# Patient Record
Sex: MALE | Race: WHITE | NOT HISPANIC OR LATINO | Employment: OTHER | RURAL
[De-identification: names, ages, dates, MRNs, and addresses within clinical notes are randomized per-mention and may not be internally consistent; named-entity substitution may affect disease eponyms.]

---

## 2017-03-18 ENCOUNTER — CONVERSION ENCOUNTER (OUTPATIENT)
Dept: FAMILY MEDICINE CLINIC | Facility: CLINIC | Age: 57
End: 2017-03-18

## 2017-03-19 LAB
ALBUMIN SERPL-MCNC: 4.1 G/DL (ref 3.6–5.1)
ALP SERPL-CCNC: 69 U/L (ref 40–115)
ALT SERPL-CCNC: 15 U/L (ref 9–46)
AST SERPL-CCNC: 14 U/L (ref 10–35)
BILIRUB SERPL-MCNC: 0.3 MG/DL (ref 0.2–1.2)
BUN SERPL-MCNC: 15 MG/DL (ref 7–25)
BUN/CREAT SERPL: NORMAL (CALC) (ref 6–22)
CALCIUM SERPL-MCNC: 9.7 MG/DL (ref 8.6–10.3)
CHLORIDE SERPL-SCNC: 108 MMOL/L (ref 98–110)
CHOLEST SERPL-MCNC: 167 MG/DL (ref 125–200)
CHOLEST/HDLC SERPL: 5.8 (CALC)
CONV CO2: 27 MMOL/L (ref 20–31)
CONV TOTAL PROTEIN: 6.8 G/DL (ref 6.1–8.1)
CREAT UR-MCNC: 0.79 MG/DL (ref 0.7–1.33)
GLOBULIN UR ELPH-MCNC: 2.7 MG/DL (ref 1.9–3.7)
GLUCOSE UR QL: 93 MG/DL (ref 65–99)
HDLC SERPL-MCNC: 29 MG/DL
INSULIN SERPL-ACNC: 1.5 (CALC) (ref 1–2.5)
LDLC SERPL CALC-MCNC: 125 MG/DL
NONHDLC SERPL-MCNC: 138 MG/DL
POTASSIUM SERPL-SCNC: 4.3 MMOL/L (ref 3.5–5.3)
PSA SERPL-MCNC: 0.9 NG/ML
SODIUM SERPL-SCNC: 141 MMOL/L (ref 135–146)
TRIGL SERPL-MCNC: 63 MG/DL

## 2017-04-07 ENCOUNTER — OFFICE VISIT (OUTPATIENT)
Dept: NEUROLOGY | Facility: CLINIC | Age: 57
End: 2017-04-07

## 2017-04-07 VITALS
BODY MASS INDEX: 29.4 KG/M2 | SYSTOLIC BLOOD PRESSURE: 125 MMHG | HEIGHT: 71 IN | WEIGHT: 210 LBS | DIASTOLIC BLOOD PRESSURE: 80 MMHG

## 2017-04-07 DIAGNOSIS — R56.9 CONVULSIONS, UNSPECIFIED CONVULSION TYPE (HCC): Primary | ICD-10-CM

## 2017-04-07 PROCEDURE — 99212 OFFICE O/P EST SF 10 MIN: CPT | Performed by: PSYCHIATRY & NEUROLOGY

## 2017-04-07 RX ORDER — PAROXETINE 10 MG/1
1 TABLET, FILM COATED ORAL DAILY
Refills: 1 | COMMUNITY
Start: 2017-04-04 | End: 2018-04-27 | Stop reason: ALTCHOICE

## 2017-04-07 RX ORDER — LEVETIRACETAM 500 MG/1
500 TABLET ORAL 2 TIMES DAILY
Qty: 60 TABLET | Refills: 11 | Status: SHIPPED | OUTPATIENT
Start: 2017-04-07 | End: 2018-04-27 | Stop reason: SDUPTHER

## 2017-04-07 NOTE — PROGRESS NOTES
Subjective:     Patient ID: Mook Bose is a 57 y.o. male.    History of Present Illness     The patient's had no seizures over the past year.  His last was in 2013.  He is having no side effects.  Is compliant with medication.  The following portions of the patient's history were reviewed and updated as appropriate: allergies, current medications, past family history, past medical history, past social history, past surgical history and problem list.      Current Outpatient Prescriptions:   •  levETIRAcetam (KEPPRA) 500 MG tablet, Take 1 tablet by mouth 2 (Two) Times a Day., Disp: 60 tablet, Rfl: 11  •  Omega-3 Fatty Acids (FISH OIL) 1000 MG capsule capsule, Take  by mouth., Disp: , Rfl:   •  PARoxetine (PAXIL) 10 MG tablet, Take 1 tablet by mouth Daily., Disp: , Rfl: 1    Review of Systems   Constitutional: Negative.    Neurological: Negative.    Psychiatric/Behavioral: Negative.         Objective:    Neurologic Exam  Mental status examination was appropriate.  Funduscopy, visual fields, eye movements and pupillary reflexes were normal.  No facial weakness was noted.  Gait was normal.  No pattern of focal weakness was noted.  Physical Exam    Assessment/Plan:     Mook was seen today for seizures.    Diagnoses and all orders for this visit:    Convulsions, unspecified convulsion type    Other orders  -     levETIRAcetam (KEPPRA) 500 MG tablet; Take 1 tablet by mouth 2 (Two) Times a Day.       Prescription drug management - generic Keppra as above    Follow-up in the office in one year.Thank you for allowing me to share in the care of this patient.  Adolph Oneill M.D.

## 2017-09-05 ENCOUNTER — HOSPITAL ENCOUNTER (OUTPATIENT)
Dept: OTHER | Facility: HOSPITAL | Age: 57
Discharge: HOME | End: 2017-09-05
Attending: FAMILY MEDICINE | Admitting: FAMILY MEDICINE

## 2017-11-21 ENCOUNTER — TELEPHONE (OUTPATIENT)
Dept: NEUROLOGY | Facility: CLINIC | Age: 57
End: 2017-11-21

## 2017-11-21 NOTE — TELEPHONE ENCOUNTER
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 He stated you would know what it is that is needed, that it is done for him yearly. Any questions call him. Thank you

## 2017-12-07 ENCOUNTER — TELEPHONE (OUTPATIENT)
Dept: NEUROLOGY | Facility: CLINIC | Age: 57
End: 2017-12-07

## 2017-12-07 NOTE — TELEPHONE ENCOUNTER
Received fax from patient. Typed a new letter and mailed it to the patient. Called patient and made him aware.

## 2017-12-07 NOTE — TELEPHONE ENCOUNTER
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 He also stated that he will fax the form over to us and a letter from his PCP. He asked if we could write out a letter similar to the one his PCP wrote.  Please Advice.

## 2018-04-27 ENCOUNTER — OFFICE VISIT (OUTPATIENT)
Dept: NEUROLOGY | Facility: CLINIC | Age: 58
End: 2018-04-27

## 2018-04-27 VITALS
SYSTOLIC BLOOD PRESSURE: 120 MMHG | BODY MASS INDEX: 28.56 KG/M2 | DIASTOLIC BLOOD PRESSURE: 70 MMHG | WEIGHT: 204 LBS | HEIGHT: 71 IN

## 2018-04-27 DIAGNOSIS — R41.3 MEMORY LOSS: ICD-10-CM

## 2018-04-27 DIAGNOSIS — R56.9 CONVULSIONS, UNSPECIFIED CONVULSION TYPE (HCC): Primary | ICD-10-CM

## 2018-04-27 PROCEDURE — 99213 OFFICE O/P EST LOW 20 MIN: CPT | Performed by: PSYCHIATRY & NEUROLOGY

## 2018-04-27 RX ORDER — LEVETIRACETAM 500 MG/1
500 TABLET ORAL 2 TIMES DAILY
Qty: 180 TABLET | Refills: 3 | Status: SHIPPED | OUTPATIENT
Start: 2018-04-27 | End: 2019-04-26 | Stop reason: SDUPTHER

## 2018-04-27 NOTE — PROGRESS NOTES
Subjective:     Patient ID: Mook Bose is a 58 y.o. male.    History of Present Illness     The patient has had no seizures over the last year.  Last seizure was in 2013.  EEG in 2009 showed left frontotemporal slowing.  Compliant with the medicine.  No side effects.  He is also having some problems with his memory.  Sounds like a static problem.  He is interested in taking an over-the-counter medicine for this but does not know the name of it.  He will call with the name of that drug.  The following portions of the patient's history were reviewed and updated as appropriate: allergies, current medications, past family history, past medical history, past social history, past surgical history and problem list.      Current Outpatient Prescriptions:   •  aspirin 81 MG tablet, Take 81 mg by mouth Daily., Disp: , Rfl:   •  levETIRAcetam (KEPPRA) 500 MG tablet, Take 1 tablet by mouth 2 (Two) Times a Day., Disp: 180 tablet, Rfl: 3  •  Omega-3 Fatty Acids (FISH OIL) 1000 MG capsule capsule, Take  by mouth., Disp: , Rfl:     Review of Systems   Constitutional: Negative.    Neurological: Negative.    Psychiatric/Behavioral: Negative.         Objective:    Neurologic Exam  Mental status examination was appropriate.  Funduscopy, visual fields, eye movements and pupillary reflexes were normal.  No facial weakness was noted.  Gait was normal.  No pattern of focal weakness was noted.  Physical Exam    Assessment/Plan:     Mook was seen today for seizures.    Diagnoses and all orders for this visit:    Convulsions, unspecified convulsion type    Memory loss    Other orders  -     levETIRAcetam (KEPPRA) 500 MG tablet; Take 1 tablet by mouth 2 (Two) Times a Day.       Prescription drug management - meds as above  Will call concerning medicine for memory.  Follow-up in the office in one year. Thank you for allowing me to share in the care of this patient.  Adolph Oneill M.D.

## 2018-12-14 ENCOUNTER — TELEPHONE (OUTPATIENT)
Dept: NEUROLOGY | Facility: CLINIC | Age: 58
End: 2018-12-14

## 2018-12-14 NOTE — TELEPHONE ENCOUNTER
Patient is requesting a letter for the [employer] office stating that he has been seizure free. This is the same letter that he has needed for the last few years. Please call when available, he will

## 2019-04-26 ENCOUNTER — OFFICE VISIT (OUTPATIENT)
Dept: NEUROLOGY | Facility: CLINIC | Age: 59
End: 2019-04-26

## 2019-04-26 VITALS
SYSTOLIC BLOOD PRESSURE: 122 MMHG | HEIGHT: 70 IN | BODY MASS INDEX: 29.2 KG/M2 | WEIGHT: 204 LBS | DIASTOLIC BLOOD PRESSURE: 60 MMHG

## 2019-04-26 DIAGNOSIS — R41.3 MEMORY LOSS: ICD-10-CM

## 2019-04-26 DIAGNOSIS — R56.9 CONVULSIONS, UNSPECIFIED CONVULSION TYPE (HCC): Primary | ICD-10-CM

## 2019-04-26 PROCEDURE — 99213 OFFICE O/P EST LOW 20 MIN: CPT | Performed by: PSYCHIATRY & NEUROLOGY

## 2019-04-26 RX ORDER — LEVETIRACETAM 500 MG/1
500 TABLET ORAL 2 TIMES DAILY
Qty: 180 TABLET | Refills: 3 | Status: SHIPPED | OUTPATIENT
Start: 2019-04-26 | End: 2020-05-04

## 2019-04-26 NOTE — PROGRESS NOTES
Subjective:     Patient ID: Mook Bose is a 59 y.o. male.    History of Present Illness    The patient was seen back in the office for follow-up of seizures and memory difficulties.  No seizures over the past year.  Keppra 500 mg twice daily.  Compliant.  No side effects.  Memory problems have improved.  He was under a lot of stress at the time.  Is also on some over-the-counter medication for this.    The following portions of the patient's history were reviewed and updated as appropriate: allergies, current medications, past family history, past medical history, past social history, past surgical history and problem list.      Current Outpatient Medications:   •  aspirin 81 MG tablet, Take 81 mg by mouth Daily., Disp: , Rfl:   •  levETIRAcetam (KEPPRA) 500 MG tablet, Take 1 tablet by mouth 2 (Two) Times a Day., Disp: 180 tablet, Rfl: 3  •  Omega-3 Fatty Acids (FISH OIL) 1000 MG capsule capsule, Take  by mouth., Disp: , Rfl:     Review of Systems   Constitutional: Negative.    Neurological: Negative.    Psychiatric/Behavioral: Negative.           I have reviewed ROS completed by medical assistant.     Objective:    Neurologic Exam  Exam  Physical Exam    Assessment/Plan:     Mook was seen today for seizures.    Diagnoses and all orders for this visit:    Convulsions, unspecified convulsion type (CMS/HCC)    Memory loss    Other orders  -     levETIRAcetam (KEPPRA) 500 MG tablet; Take 1 tablet by mouth 2 (Two) Times a Day.         Prescription drug management - meds as above    Follow-up in the office in one year. Thank you for allowing me to share in the care of this patient.  Adolph Oneill M.D.

## 2019-09-12 ENCOUNTER — OFFICE VISIT (OUTPATIENT)
Dept: FAMILY MEDICINE CLINIC | Facility: CLINIC | Age: 59
End: 2019-09-12

## 2019-09-12 VITALS
HEART RATE: 85 BPM | SYSTOLIC BLOOD PRESSURE: 116 MMHG | BODY MASS INDEX: 29.98 KG/M2 | DIASTOLIC BLOOD PRESSURE: 62 MMHG | TEMPERATURE: 97.3 F | RESPIRATION RATE: 16 BRPM | HEIGHT: 70 IN | WEIGHT: 209.4 LBS | OXYGEN SATURATION: 96 %

## 2019-09-12 DIAGNOSIS — S06.5XAA SUBDURAL HEMATOMA (HCC): ICD-10-CM

## 2019-09-12 DIAGNOSIS — R56.9 SEIZURES (HCC): ICD-10-CM

## 2019-09-12 DIAGNOSIS — S12.591A OTHER CLOSED NONDISPLACED FRACTURE OF SIXTH CERVICAL VERTEBRA, INITIAL ENCOUNTER (HCC): Primary | ICD-10-CM

## 2019-09-12 PROBLEM — S12.501A CLOSED NONDISPLACED FRACTURE OF SIXTH CERVICAL VERTEBRA (HCC): Status: ACTIVE | Noted: 2019-09-12

## 2019-09-12 PROCEDURE — 99214 OFFICE O/P EST MOD 30 MIN: CPT | Performed by: FAMILY MEDICINE

## 2019-09-12 RX ORDER — METHOCARBAMOL 500 MG/1
2 TABLET, FILM COATED ORAL 4 TIMES DAILY
Refills: 0 | COMMUNITY
Start: 2019-09-04 | End: 2020-02-19
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
## 2019-09-12 NOTE — ASSESSMENT & PLAN NOTE
Pt doing well.   Will follow up with neuro at [place] 10/15/19.  Advised pt to discontinue Aspirin and fish oil for approximately 2 -3 months.

## 2019-10-03 ENCOUNTER — TELEPHONE (OUTPATIENT)
Dept: FAMILY MEDICINE CLINIC | Facility: CLINIC | Age: 59
End: 2019-10-03

## 2020-02-19 ENCOUNTER — TELEPHONE (OUTPATIENT)
Dept: FAMILY MEDICINE CLINIC | Facility: CLINIC | Age: 60
End: 2020-02-19

## 2020-02-19 ENCOUNTER — OFFICE VISIT (OUTPATIENT)
Dept: FAMILY MEDICINE CLINIC | Facility: CLINIC | Age: 60
End: 2020-02-19

## 2020-02-19 VITALS
RESPIRATION RATE: 18 BRPM | TEMPERATURE: 98 F | HEART RATE: 76 BPM | SYSTOLIC BLOOD PRESSURE: 121 MMHG | HEIGHT: 70 IN | BODY MASS INDEX: 31.35 KG/M2 | DIASTOLIC BLOOD PRESSURE: 84 MMHG | WEIGHT: 219 LBS | OXYGEN SATURATION: 98 %

## 2020-02-19 DIAGNOSIS — M79.2 NERVE PAIN: Primary | ICD-10-CM

## 2020-02-19 PROCEDURE — 99214 OFFICE O/P EST MOD 30 MIN: CPT | Performed by: FAMILY MEDICINE

## 2020-02-19 RX ORDER — ACYCLOVIR 800 MG/1
800 TABLET ORAL
Qty: 50 TABLET | Refills: 0 | Status: SHIPPED | OUTPATIENT
Start: 2020-02-19 | End: 2021-02-04

## 2020-02-19 RX ORDER — PREDNISONE 20 MG/1
20 TABLET ORAL DAILY
Qty: 26 TABLET | Refills: 0 | Status: SHIPPED | OUTPATIENT
Start: 2020-02-19 | End: 2020-03-06

## 2020-03-02 ENCOUNTER — TELEPHONE (OUTPATIENT)
Dept: FAMILY MEDICINE CLINIC | Facility: CLINIC | Age: 60
End: 2020-03-02

## 2020-03-22 PROBLEM — M79.2 NERVE PAIN: Status: ACTIVE | Noted: 2020-03-22

## 2020-03-22 NOTE — ASSESSMENT & PLAN NOTE
Patient reports his symptoms are similar to Bell's palsy when he had them previously.  Despite advised the patient go to the hospital patient refused.  Patient was prescribed prednisone and Acyclovir to treat his symptoms.

## 2020-04-16 ENCOUNTER — TELEPHONE (OUTPATIENT)
Dept: FAMILY MEDICINE CLINIC | Facility: CLINIC | Age: 60
End: 2020-04-16

## 2020-05-04 RX ORDER — LEVETIRACETAM 500 MG/1
TABLET ORAL
Qty: 180 TABLET | Refills: 0 | Status: SHIPPED | OUTPATIENT
Start: 2020-05-04 | End: 2020-08-06

## 2020-06-12 ENCOUNTER — CLINICAL SUPPORT (OUTPATIENT)
Dept: FAMILY MEDICINE CLINIC | Facility: CLINIC | Age: 60
End: 2020-06-12

## 2020-06-12 NOTE — PROGRESS NOTES
"Blood Pressure Check:   Patient came to the office after he was sent by his son. 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 His son encouraged him to come in and have his BP checked.   I asked if he had any symptoms and he denied the following when asked: dizziness, lightheaded, headache, blurry vision, shaky, nausea, chest pain, chest pressure/tightness, shortness of breath.   I asked when he last ate- he said at breakfast- just something light.   He has worked outside all day (in a rinku shirt, jeans and boots- probably safety equipment, harness, hardhat as well) and thinks he just might have gotten hot.     I asked if he would allow me to check his blood sugar to see if this is why he was feeling the way he is. He declined.   His BP was taken manually 120/82; Pulse 55 O2 76.   I asked if he would like to see the doctor to see if more was going on other than his blood pressure. He said \"no thank you. I feel like Im ok- maybe just got a little hot\"     I advised that if he starts to feel bad again, please give us a call or go to the Urgent Care or ER.     -Za Kulkarni LPN    "

## 2020-08-06 RX ORDER — LEVETIRACETAM 500 MG/1
TABLET ORAL
Qty: 180 TABLET | Refills: 0 | Status: SHIPPED | OUTPATIENT
Start: 2020-08-06 | End: 2020-08-13 | Stop reason: SDUPTHER

## 2020-08-13 ENCOUNTER — OFFICE VISIT (OUTPATIENT)
Dept: NEUROLOGY | Facility: CLINIC | Age: 60
End: 2020-08-13

## 2020-08-13 DIAGNOSIS — R56.9 SEIZURES (HCC): Primary | ICD-10-CM

## 2020-08-13 DIAGNOSIS — S06.5XAA SUBDURAL HEMATOMA (HCC): ICD-10-CM

## 2020-08-13 PROCEDURE — 99442 PR PHYS/QHP TELEPHONE EVALUATION 11-20 MIN: CPT | Performed by: PSYCHIATRY & NEUROLOGY

## 2020-08-13 RX ORDER — LEVETIRACETAM 500 MG/1
500 TABLET ORAL 2 TIMES DAILY
Qty: 180 TABLET | Refills: 3 | Status: SHIPPED | OUTPATIENT
Start: 2020-08-13 | End: 2020-11-16 | Stop reason: SDUPTHER

## 2020-08-13 NOTE — PROGRESS NOTES
Phone visit.  Follow-up of seizures.  Subdural hematoma in the past.  No seizures for a number of years.  He is on Keppra generic 500 mg twice daily.  No side effects.  Happy with current management.           Mook was seen today for seizures.    Diagnoses and all orders for this visit:    Seizures (CMS/HCC)    Subdural hematoma (CMS/HCC)    Other orders  -     levETIRAcetam (KEPPRA) 500 MG tablet; Take 1 tablet by mouth 2 (Two) Times a Day.    Prescription drug management - meds as above    Follow-up in the office in one year. Thank you for allowing me to share in the care of this patient.  Adolph Oneill M.D.      You have chosen to receive care through a telephone visit. Do you consent to use a telephone visit for your medical care today? Yes  This visit has been rescheduled as a phone visit to comply with patient safety concerns in accordance with CDC recommendations. Total time of discussion was 18 minutes.

## 2020-10-27 ENCOUNTER — CLINICAL SUPPORT (OUTPATIENT)
Dept: FAMILY MEDICINE CLINIC | Facility: CLINIC | Age: 60
End: 2020-10-27

## 2020-10-27 VITALS
OXYGEN SATURATION: 98 % | RESPIRATION RATE: 20 BRPM | HEIGHT: 70 IN | TEMPERATURE: 97.9 F | SYSTOLIC BLOOD PRESSURE: 121 MMHG | BODY MASS INDEX: 29.43 KG/M2 | HEART RATE: 64 BPM | DIASTOLIC BLOOD PRESSURE: 76 MMHG | WEIGHT: 205.6 LBS

## 2020-10-27 DIAGNOSIS — Z02.4 ENCOUNTER FOR CDL (COMMERCIAL DRIVING LICENSE) EXAM: Primary | ICD-10-CM

## 2020-10-27 DIAGNOSIS — E66.3 OVERWEIGHT WITH BODY MASS INDEX (BMI) OF 29 TO 29.9 IN ADULT: ICD-10-CM

## 2020-10-27 DIAGNOSIS — Z87.891 HISTORY OF TOBACCO USE: ICD-10-CM

## 2020-10-27 LAB
BILIRUB BLD-MCNC: NEGATIVE MG/DL
CLARITY, POC: CLEAR
COLOR UR: YELLOW
GLUCOSE UR STRIP-MCNC: NEGATIVE MG/DL
KETONES UR QL: NEGATIVE
LEUKOCYTE EST, POC: NEGATIVE
NITRITE UR-MCNC: NEGATIVE MG/ML
PH UR: 5 [PH] (ref 5–8)
PROT UR STRIP-MCNC: ABNORMAL MG/DL
RBC # UR STRIP: NEGATIVE /UL
SP GR UR: 1.02 (ref 1–1.03)
UROBILINOGEN UR QL: NORMAL

## 2020-10-27 PROCEDURE — 81002 URINALYSIS NONAUTO W/O SCOPE: CPT | Performed by: FAMILY MEDICINE

## 2020-10-27 PROCEDURE — DOTPHY: Performed by: FAMILY MEDICINE

## 2020-10-28 ENCOUNTER — TELEPHONE (OUTPATIENT)
Dept: NEUROLOGY | Facility: CLINIC | Age: 60
End: 2020-10-28

## 2020-10-28 NOTE — TELEPHONE ENCOUNTER
Left a message to call back and make appointment with Dr Puri or Dr Durham. 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

## 2020-10-28 NOTE — TELEPHONE ENCOUNTER
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       He also needs to know who his new neurologist is and get scheduled for them.     Can someone please contact patient and get him scheduled and advise on the letter?     [phone]    Thank you

## 2020-11-16 ENCOUNTER — TELEPHONE (OUTPATIENT)
Dept: NEUROLOGY | Facility: CLINIC | Age: 60
End: 2020-11-16

## 2020-11-16 RX ORDER — LEVETIRACETAM 500 MG/1
500 TABLET ORAL 2 TIMES DAILY
Qty: 180 TABLET | Refills: 3 | Status: SHIPPED | OUTPATIENT
Start: 2020-11-16 | End: 2021-01-20 | Stop reason: SDUPTHER

## 2020-11-16 NOTE — TELEPHONE ENCOUNTER
Caller: SATYA LOPEZ     Relationship: SELF    Best call back number: [phone]    Medication needed: levETIRAcetam (KEPPRA) 500 MG tablet    When do you need the refill by: ONLY 1.5 DAY LEFT    What details did the patient provide when requesting the medication: PT STATED BOTTLE SAYS NO MORE REFILLS BUT HE HAS TO HAVE THIS MEDICATION. STATES HE HAS TAKEN THIS MEDICATION FOR OVER 10 YEARS. PT STATES HE HAS NEVER BEEN THIS CLOSE TO BEING OUT OF HIS PRESCRIPTION.       What is the patient's preferred pharmacy:  [facility]        PLEASE ADVISE.     THANK YOU

## 2021-01-20 ENCOUNTER — OFFICE VISIT (OUTPATIENT)
Dept: FAMILY MEDICINE CLINIC | Facility: CLINIC | Age: 61
End: 2021-01-20

## 2021-01-20 VITALS
TEMPERATURE: 97.7 F | OXYGEN SATURATION: 99 % | DIASTOLIC BLOOD PRESSURE: 75 MMHG | SYSTOLIC BLOOD PRESSURE: 129 MMHG | WEIGHT: 206 LBS | HEIGHT: 71 IN | RESPIRATION RATE: 18 BRPM | HEART RATE: 60 BPM | BODY MASS INDEX: 28.84 KG/M2

## 2021-01-20 DIAGNOSIS — R41.3 MEMORY LOSS: ICD-10-CM

## 2021-01-20 DIAGNOSIS — R56.9 SEIZURES (HCC): Primary | ICD-10-CM

## 2021-01-20 DIAGNOSIS — R55 SYNCOPE, UNSPECIFIED SYNCOPE TYPE: ICD-10-CM

## 2021-01-20 DIAGNOSIS — F05 CONFUSION AFTER A SEIZURE: ICD-10-CM

## 2021-01-20 DIAGNOSIS — S06.5XAA SUBDURAL HEMATOMA (HCC): ICD-10-CM

## 2021-01-20 PROCEDURE — 99214 OFFICE O/P EST MOD 30 MIN: CPT | Performed by: FAMILY MEDICINE

## 2021-01-20 RX ORDER — MULTIVIT WITH MINERALS/LUTEIN
250 TABLET ORAL DAILY
COMMUNITY
End: 2021-02-22 | Stop reason: SDUPTHER

## 2021-01-20 RX ORDER — LEVETIRACETAM 500 MG/1
500 TABLET ORAL 3 TIMES DAILY
Qty: 270 TABLET | Refills: 0 | Status: SHIPPED | OUTPATIENT
Start: 2021-01-20 | End: 2021-02-22 | Stop reason: SDUPTHER

## 2021-01-20 NOTE — ASSESSMENT & PLAN NOTE
Worse.  Probable 2nd to seizures.   MMSE done  today, read by me, reviewed with pt  MMSE score was 29 out of 30.  Pt. Is scheduled to follow with Neurologist in 2-2021.

## 2021-01-20 NOTE — ASSESSMENT & PLAN NOTE
Discussed MRI of the brain.  Pt. Declines at present.  Pt. Is scheduled to follow with Neurologist in 2-2021.

## 2021-01-20 NOTE — PROGRESS NOTES
Subjective   Mook Bose is a 60 y.o. male.     Confusion over the last 3 months is becoming more frequent and worsening.  This always occurs after episodes would appear to be minor seizures will last from hours to as long as a day    Memory Loss  This is a recurrent problem. The current episode started more than 1 year ago. The problem occurs intermittently (Only occurs after episodes what sounds like minor seizures). The problem has been gradually worsening. Associated symptoms include diaphoresis (night wakes up wet) and fatigue. Pertinent negatives include no chills or fever. Nothing aggravates the symptoms. He has tried nothing for the symptoms.   Syncope  This is a new problem. The current episode started 1 to 4 weeks ago. Nothing aggravates the symptoms. Associated symptoms include confusion, diaphoresis (night wakes up wet) and dizziness. Pertinent negatives include no fever. He has tried nothing for the symptoms.   Dizziness  This is a new problem. The current episode started more than 1 month ago. The problem occurs constantly. The problem has been gradually worsening. Associated symptoms include diaphoresis (night wakes up wet) and fatigue. Pertinent negatives include no chills or fever. The treatment provided no relief.        The following portions of the patient's history were reviewed and updated as appropriate: allergies, current medications, past family history, past medical history, past social history, past surgical history and problem list.    Family History   Problem Relation Age of Onset   • Alzheimer's disease Mother 62   • Cancer Father    • Alzheimer's disease Sister 70   • No Known Problems Brother    • No Known Problems Daughter    • No Known Problems Son    • No Known Problems Brother    • No Known Problems Brother        Social History     Tobacco Use   • Smoking status: Former Smoker     Years: 21.00     Types: Cigars, Cigarettes     Quit date:      Years since quittin.0  "  • Smokeless tobacco: Never Used   Substance Use Topics   • Alcohol use: No   • Drug use: No       Past Surgical History:   Procedure Laterality Date   • NO PAST SURGERIES         Patient Active Problem List   Diagnosis   • Seizures (CMS/HCC)   • Memory loss   • Closed nondisplaced fracture of sixth cervical vertebra (CMS/HCC)   • Subdural hematoma (CMS/HCC)   • Nerve pain   • Encounter for CDL (commercial driving license) exam   • Overweight with body mass index (BMI) of 29 to 29.9 in adult   • History of tobacco use   • Syncope   • Confusion after a seizure       Current Outpatient Medications on File Prior to Visit   Medication Sig Dispense Refill   • aspirin 81 MG tablet Take 81 mg by mouth Daily.     • Omega-3 Fatty Acids (FISH OIL) 1000 MG capsule capsule Take  by mouth.     • vitamin C (ASCORBIC ACID) 250 MG tablet Take 250 mg by mouth Daily.     • acyclovir (ZOVIRAX) 800 MG tablet Take 1 tablet by mouth 5 (Five) Times a Day. 50 tablet 0     No current facility-administered medications on file prior to visit.        Allergies   Allergen Reactions   • Penicillins Hives       Review of Systems   Constitutional: Positive for diaphoresis (night wakes up wet) and fatigue. Negative for chills and fever.   Cardiovascular: Positive for syncope.   Neurological: Positive for dizziness, syncope, speech difficulty, memory problem and confusion.       Objective   Visit Vitals  /75 (BP Location: Right arm, Patient Position: Sitting, Cuff Size: Large Adult)   Pulse 60   Temp 97.7 °F (36.5 °C) (Temporal)   Resp 18   Ht 180.3 cm (71\")   Wt 93.4 kg (206 lb)   SpO2 99%   BMI 28.73 kg/m²     Physical Exam  Vitals signs and nursing note reviewed.   Constitutional:       Appearance: He is well-developed.   HENT:      Head: Normocephalic.   Neck:      Musculoskeletal: Neck supple.      Thyroid: No thyromegaly.      Vascular: No carotid bruit.      Trachea: Trachea normal.   Cardiovascular:      Rate and Rhythm: Normal rate " and regular rhythm.      Heart sounds: No murmur. No friction rub. No gallop.    Pulmonary:      Effort: Pulmonary effort is normal. No respiratory distress.      Breath sounds: Normal breath sounds. No wheezing.   Chest:      Chest wall: No tenderness.   Skin:     General: Skin is dry.      Findings: No rash.      Nails: There is no clubbing.     Neurological:      General: No focal deficit present.      Mental Status: He is alert and oriented to person, place, and time.      Cranial Nerves: Cranial nerves are intact.   Psychiatric:         Behavior: Behavior is cooperative.           Assessment/Plan .  Problem List Items Addressed This Visit        High    Seizures (CMS/HCC) - Primary    Current Assessment & Plan     Worse.  Increase Keppra to TID.  Pt. Is scheduled to follow with Neurologist in 2-2021.         Subdural hematoma (CMS/HCC)    Current Assessment & Plan     Discussed MRI of the brain.  Pt. Declines at present.  Pt. Is scheduled to follow with Neurologist in 2-2021.            Unprioritized    Confusion after a seizure    Current Assessment & Plan     New dx.  2nd to seizure.  Pt. Is scheduled to follow with Neurologist in 2-2021.         Memory loss    Current Assessment & Plan     Worse.  Probable 2nd to seizures.   MMSE done  today, read by me, reviewed with pt  MMSE score was 29 out of 30.  Pt. Is scheduled to follow with Neurologist in 2-2021.         Syncope    Current Assessment & Plan     New dx.  EKG done today, read by me, reviewed with pt.  EKG was normal, discussed Holter monitor, pt. Declines  Present.  Probable 2nd to seizures.

## 2021-02-04 ENCOUNTER — OFFICE VISIT (OUTPATIENT)
Dept: FAMILY MEDICINE CLINIC | Facility: CLINIC | Age: 61
End: 2021-02-04

## 2021-02-04 VITALS
OXYGEN SATURATION: 99 % | DIASTOLIC BLOOD PRESSURE: 73 MMHG | RESPIRATION RATE: 16 BRPM | BODY MASS INDEX: 29.49 KG/M2 | HEART RATE: 60 BPM | HEIGHT: 70 IN | SYSTOLIC BLOOD PRESSURE: 122 MMHG | TEMPERATURE: 97.7 F | WEIGHT: 206 LBS

## 2021-02-04 DIAGNOSIS — R53.83 LETHARGY: ICD-10-CM

## 2021-02-04 DIAGNOSIS — R41.3 MEMORY LOSS: ICD-10-CM

## 2021-02-04 DIAGNOSIS — R35.1 NOCTURIA: ICD-10-CM

## 2021-02-04 DIAGNOSIS — R56.9 SEIZURES (HCC): Primary | ICD-10-CM

## 2021-02-04 DIAGNOSIS — E78.2 MIXED HYPERLIPIDEMIA: ICD-10-CM

## 2021-02-04 DIAGNOSIS — Z12.5 SCREENING PSA (PROSTATE SPECIFIC ANTIGEN): ICD-10-CM

## 2021-02-04 DIAGNOSIS — R73.9 HYPERGLYCEMIA: ICD-10-CM

## 2021-02-04 DIAGNOSIS — H90.2 CONDUCTIVE HEARING LOSS, UNSPECIFIED LATERALITY: ICD-10-CM

## 2021-02-04 PROBLEM — E78.5 HYPERLIPIDEMIA: Status: ACTIVE | Noted: 2021-02-04

## 2021-02-04 PROBLEM — H91.90 HEARING LOSS: Status: ACTIVE | Noted: 2021-02-04

## 2021-02-04 LAB
BILIRUB BLD-MCNC: NEGATIVE MG/DL
CLARITY, POC: CLEAR
COLOR UR: YELLOW
GLUCOSE UR STRIP-MCNC: NEGATIVE MG/DL
KETONES UR QL: NEGATIVE
LEUKOCYTE EST, POC: NEGATIVE
NITRITE UR-MCNC: NEGATIVE MG/ML
PH UR: 5 [PH] (ref 5–8)
PROT UR STRIP-MCNC: NEGATIVE MG/DL
RBC # UR STRIP: NEGATIVE /UL
SP GR UR: 1.01 (ref 1–1.03)
UROBILINOGEN UR QL: NORMAL

## 2021-02-04 PROCEDURE — 81002 URINALYSIS NONAUTO W/O SCOPE: CPT | Performed by: FAMILY MEDICINE

## 2021-02-04 PROCEDURE — 99213 OFFICE O/P EST LOW 20 MIN: CPT | Performed by: FAMILY MEDICINE

## 2021-02-04 NOTE — PROGRESS NOTES
Subjective   Mook Bose is a 60 y.o. male.     Seizures   This is a chronic problem. The current episode started more than 1 week ago. The problem has been gradually improving. Pertinent negatives include no chest pain.   Urinary Frequency   This is a chronic problem. The current episode started more than 1 year ago. The problem occurs intermittently. The patient is experiencing no pain. Associated symptoms include frequency.        The following portions of the patient's history were reviewed and updated as appropriate: current medications, past family history, past medical history, past social history, past surgical history and problem list.    Family History   Problem Relation Age of Onset   • Alzheimer's disease Mother 62   • Cancer Father    • Alzheimer's disease Sister 70   • No Known Problems Brother    • No Known Problems Daughter    • No Known Problems Son    • No Known Problems Brother    • No Known Problems Brother        Social History     Tobacco Use   • Smoking status: Former Smoker     Years: 21.00     Types: Cigars, Cigarettes     Quit date:      Years since quittin.   • Smokeless tobacco: Never Used   Substance Use Topics   • Alcohol use: No   • Drug use: No       Past Surgical History:   Procedure Laterality Date   • NO PAST SURGERIES         Patient Active Problem List   Diagnosis   • Seizures (CMS/HCC)   • Memory loss   • Closed nondisplaced fracture of sixth cervical vertebra (CMS/HCC)   • Subdural hematoma (CMS/HCC)   • Nerve pain   • Encounter for CDL (commercial driving license) exam   • Overweight with body mass index (BMI) of 29 to 29.9 in adult   • History of tobacco use   • Syncope   • Confusion after a seizure   • Hearing loss   • Lethargy   • Nocturia   • Screening PSA (prostate specific antigen)   • Hyperlipidemia   • Hyperglycemia       Current Outpatient Medications on File Prior to Visit   Medication Sig Dispense Refill   • aspirin 81 MG tablet Take 81 mg by mouth  "Daily.     • levETIRAcetam (KEPPRA) 500 MG tablet Take 1 tablet by mouth 3 (Three) Times a Day. 270 tablet 0   • Omega-3 Fatty Acids (FISH OIL) 1000 MG capsule capsule Take  by mouth.     • vitamin C (ASCORBIC ACID) 250 MG tablet Take 250 mg by mouth Daily.     • [DISCONTINUED] acyclovir (ZOVIRAX) 800 MG tablet Take 1 tablet by mouth 5 (Five) Times a Day. 50 tablet 0     No current facility-administered medications on file prior to visit.        Allergies   Allergen Reactions   • Penicillins Hives       Review of Systems   Constitutional: Positive for fatigue.   HENT: Positive for hearing loss.    Respiratory: Negative for shortness of breath.    Cardiovascular: Negative for chest pain and palpitations.   Genitourinary: Positive for frequency.        Nocturia   Musculoskeletal: Negative for joint swelling.   Neurological: Positive for seizures and memory problem.       Objective   Visit Vitals  /73 (BP Location: Left arm, Patient Position: Sitting, Cuff Size: Large Adult)   Pulse 60   Temp 97.7 °F (36.5 °C)   Resp 16   Ht 177.8 cm (70\")   Wt 93.4 kg (206 lb)   SpO2 99%   BMI 29.56 kg/m²     Physical Exam  Vitals signs and nursing note reviewed.   Constitutional:       Appearance: He is well-developed.   HENT:      Head: Normocephalic.   Neck:      Musculoskeletal: Neck supple.      Thyroid: No thyromegaly.      Vascular: No carotid bruit.      Trachea: Trachea normal.   Cardiovascular:      Rate and Rhythm: Normal rate and regular rhythm.      Heart sounds: No murmur. No friction rub. No gallop.    Pulmonary:      Effort: Pulmonary effort is normal. No respiratory distress.      Breath sounds: Normal breath sounds. No wheezing.   Chest:      Chest wall: No tenderness.   Skin:     General: Skin is dry.      Findings: No rash.      Nails: There is no clubbing.     Neurological:      Mental Status: He is alert and oriented to person, place, and time.   Psychiatric:         Behavior: Behavior is cooperative. "           Assessment/Plan .  Problem List Items Addressed This Visit        High    Seizures (CMS/HCC) - Primary    Current Assessment & Plan     ---Will draw labs today and discuss results at next visit.         Relevant Orders    Levetiracetam Level (Keppra)       Unprioritized    Hearing loss    Current Assessment & Plan     Advised to use ear protection and avoid noise exposure.         Hyperglycemia    Current Assessment & Plan     Will draw labs today and discuss results at next visit.         Relevant Orders    Hemoglobin A1c    Hyperlipidemia    Current Assessment & Plan     Will draw labs today and discuss results at next visit.         Relevant Orders    Lipid Panel With / Chol / HDL Ratio    Comprehensive Metabolic Panel    Lethargy    Current Assessment & Plan     Mild- Will draw labs today and discuss results at next visit.         Relevant Orders    TSH    CBC & Differential    Memory loss    Current Assessment & Plan     Mini-mental done today- scored 28 out of 30.  Follow conservatively           Nocturia    Current Assessment & Plan     Mild- 1 x night- UA done today was normal. Follow conservatively         Relevant Orders    POCT urinalysis dipstick, manual (Completed)    Screening PSA (prostate specific antigen)    Current Assessment & Plan     Will draw labs today and discuss results at next visit.         Relevant Orders    PSA Screen

## 2021-02-05 LAB — PSA SERPL-MCNC: 1.7 NG/ML (ref 0–4)

## 2021-02-07 LAB
ALBUMIN SERPL-MCNC: 4.5 G/DL (ref 3.8–4.8)
ALBUMIN/GLOB SERPL: 1.5 {RATIO} (ref 1.2–2.2)
ALP SERPL-CCNC: 78 IU/L (ref 39–117)
ALT SERPL-CCNC: 17 IU/L (ref 0–44)
AST SERPL-CCNC: 16 IU/L (ref 0–40)
BASOPHILS # BLD AUTO: 0 X10E3/UL (ref 0–0.2)
BASOPHILS NFR BLD AUTO: 1 %
BILIRUB SERPL-MCNC: 0.4 MG/DL (ref 0–1.2)
BUN SERPL-MCNC: 18 MG/DL (ref 8–27)
BUN/CREAT SERPL: 22 (ref 10–24)
CALCIUM SERPL-MCNC: 9.8 MG/DL (ref 8.6–10.2)
CHLORIDE SERPL-SCNC: 102 MMOL/L (ref 96–106)
CHOLEST SERPL-MCNC: 168 MG/DL (ref 100–199)
CHOLEST/HDLC SERPL: 4.3 RATIO (ref 0–5)
CO2 SERPL-SCNC: 25 MMOL/L (ref 20–29)
CREAT SERPL-MCNC: 0.82 MG/DL (ref 0.76–1.27)
EOSINOPHIL # BLD AUTO: 0.1 X10E3/UL (ref 0–0.4)
EOSINOPHIL NFR BLD AUTO: 1 %
ERYTHROCYTE [DISTWIDTH] IN BLOOD BY AUTOMATED COUNT: 12.6 % (ref 11.6–15.4)
GLOBULIN SER CALC-MCNC: 3 G/DL (ref 1.5–4.5)
GLUCOSE SERPL-MCNC: 86 MG/DL (ref 65–99)
HBA1C MFR BLD: 5.4 % (ref 4.8–5.6)
HCT VFR BLD AUTO: 44.7 % (ref 37.5–51)
HDLC SERPL-MCNC: 39 MG/DL
HGB BLD-MCNC: 14.9 G/DL (ref 13–17.7)
IMM GRANULOCYTES # BLD AUTO: 0 X10E3/UL (ref 0–0.1)
IMM GRANULOCYTES NFR BLD AUTO: 1 %
LDLC SERPL CALC-MCNC: 116 MG/DL (ref 0–99)
LEVETIRACETAM SERPL-MCNC: 13.1 UG/ML (ref 10–40)
LYMPHOCYTES # BLD AUTO: 1.6 X10E3/UL (ref 0.7–3.1)
LYMPHOCYTES NFR BLD AUTO: 19 %
MCH RBC QN AUTO: 29.1 PG (ref 26.6–33)
MCHC RBC AUTO-ENTMCNC: 33.3 G/DL (ref 31.5–35.7)
MCV RBC AUTO: 87 FL (ref 79–97)
MONOCYTES # BLD AUTO: 0.4 X10E3/UL (ref 0.1–0.9)
MONOCYTES NFR BLD AUTO: 5 %
NEUTROPHILS # BLD AUTO: 6.2 X10E3/UL (ref 1.4–7)
NEUTROPHILS NFR BLD AUTO: 73 %
PLATELET # BLD AUTO: 384 X10E3/UL (ref 150–450)
POTASSIUM SERPL-SCNC: 4.7 MMOL/L (ref 3.5–5.2)
PROT SERPL-MCNC: 7.5 G/DL (ref 6–8.5)
RBC # BLD AUTO: 5.12 X10E6/UL (ref 4.14–5.8)
SODIUM SERPL-SCNC: 142 MMOL/L (ref 134–144)
TRIGL SERPL-MCNC: 65 MG/DL (ref 0–149)
TSH SERPL DL<=0.005 MIU/L-ACNC: 1.24 UIU/ML (ref 0.45–4.5)
VLDLC SERPL CALC-MCNC: 13 MG/DL (ref 5–40)
WBC # BLD AUTO: 8.4 X10E3/UL (ref 3.4–10.8)

## 2021-02-22 ENCOUNTER — OFFICE VISIT (OUTPATIENT)
Dept: FAMILY MEDICINE CLINIC | Facility: CLINIC | Age: 61
End: 2021-02-22

## 2021-02-22 VITALS
WEIGHT: 207.4 LBS | OXYGEN SATURATION: 97 % | SYSTOLIC BLOOD PRESSURE: 115 MMHG | RESPIRATION RATE: 18 BRPM | BODY MASS INDEX: 29.69 KG/M2 | HEIGHT: 70 IN | HEART RATE: 84 BPM | DIASTOLIC BLOOD PRESSURE: 72 MMHG | TEMPERATURE: 97.5 F

## 2021-02-22 DIAGNOSIS — R53.83 LETHARGY: ICD-10-CM

## 2021-02-22 DIAGNOSIS — Z82.49 FAMILY HISTORY OF CORONARY ARTERY DISEASE: ICD-10-CM

## 2021-02-22 DIAGNOSIS — R20.2 PARESTHESIA: ICD-10-CM

## 2021-02-22 DIAGNOSIS — R41.3 MEMORY LOSS: ICD-10-CM

## 2021-02-22 DIAGNOSIS — M50.90 CERVICAL DISC DISEASE: ICD-10-CM

## 2021-02-22 DIAGNOSIS — E66.3 OVERWEIGHT WITH BODY MASS INDEX (BMI) OF 29 TO 29.9 IN ADULT: ICD-10-CM

## 2021-02-22 DIAGNOSIS — M54.50 CHRONIC BILATERAL LOW BACK PAIN WITHOUT SCIATICA: ICD-10-CM

## 2021-02-22 DIAGNOSIS — G89.29 CHRONIC BILATERAL LOW BACK PAIN WITHOUT SCIATICA: ICD-10-CM

## 2021-02-22 DIAGNOSIS — M67.40 GANGLION CYST: ICD-10-CM

## 2021-02-22 DIAGNOSIS — Z00.01 ENCOUNTER FOR GENERAL ADULT MEDICAL EXAMINATION WITH ABNORMAL FINDINGS: ICD-10-CM

## 2021-02-22 DIAGNOSIS — G56.01 CARPAL TUNNEL SYNDROME OF RIGHT WRIST: ICD-10-CM

## 2021-02-22 DIAGNOSIS — E78.2 MIXED HYPERLIPIDEMIA: ICD-10-CM

## 2021-02-22 DIAGNOSIS — R56.9 SEIZURES (HCC): ICD-10-CM

## 2021-02-22 DIAGNOSIS — Z12.5 SCREENING PSA (PROSTATE SPECIFIC ANTIGEN): ICD-10-CM

## 2021-02-22 DIAGNOSIS — J34.2 DEVIATED SEPTUM: ICD-10-CM

## 2021-02-22 DIAGNOSIS — Z83.3 FAMILY HISTORY OF DIABETES MELLITUS: ICD-10-CM

## 2021-02-22 DIAGNOSIS — S06.5XAA SUBDURAL HEMATOMA (HCC): Primary | ICD-10-CM

## 2021-02-22 DIAGNOSIS — R35.1 NOCTURIA: ICD-10-CM

## 2021-02-22 DIAGNOSIS — F41.9 ANXIETY: ICD-10-CM

## 2021-02-22 DIAGNOSIS — S12.591A OTHER CLOSED NONDISPLACED FRACTURE OF SIXTH CERVICAL VERTEBRA, INITIAL ENCOUNTER (HCC): ICD-10-CM

## 2021-02-22 DIAGNOSIS — F05 CONFUSION AFTER A SEIZURE: ICD-10-CM

## 2021-02-22 DIAGNOSIS — Z87.891 HISTORY OF TOBACCO USE: ICD-10-CM

## 2021-02-22 DIAGNOSIS — K63.5 HYPERPLASTIC COLONIC POLYP, UNSPECIFIED PART OF COLON: ICD-10-CM

## 2021-02-22 DIAGNOSIS — H90.2 CONDUCTIVE HEARING LOSS, UNSPECIFIED LATERALITY: ICD-10-CM

## 2021-02-22 DIAGNOSIS — R55 SYNCOPE, UNSPECIFIED SYNCOPE TYPE: ICD-10-CM

## 2021-02-22 PROCEDURE — 99396 PREV VISIT EST AGE 40-64: CPT | Performed by: FAMILY MEDICINE

## 2021-02-22 PROCEDURE — 99214 OFFICE O/P EST MOD 30 MIN: CPT | Performed by: FAMILY MEDICINE

## 2021-02-22 RX ORDER — CHLORAL HYDRATE 500 MG
1000 CAPSULE ORAL
Qty: 90 CAPSULE | Refills: 12
Start: 2021-02-22 | End: 2022-02-23 | Stop reason: SDUPTHER

## 2021-02-22 RX ORDER — MULTIVIT WITH MINERALS/LUTEIN
250 TABLET ORAL DAILY
Qty: 30 TABLET | Refills: 12
Start: 2021-02-22 | End: 2022-02-23 | Stop reason: SDUPTHER

## 2021-02-22 RX ORDER — LEVETIRACETAM 500 MG/1
500 TABLET ORAL 3 TIMES DAILY
Qty: 270 TABLET | Refills: 3 | Status: SHIPPED | OUTPATIENT
Start: 2021-02-22 | End: 2022-02-23 | Stop reason: SDUPTHER

## 2021-02-22 NOTE — ASSESSMENT & PLAN NOTE
Labs done 2-4-2021, read by me, reviewed with pt.  Trig. 65 up from 39, Tot. Chol. 168 up from 156, HDL 39 down from 40,  up from 108.  Patient tolerated Fish oil well without side effects. I feel the benefits of the medication outweigh the risks.  Slightly worse.  Encouraged to watch fatty intake, exercise more, and lose weight.   No medication     Goals developed at last visit were not met because Is not getting adequate diet and exercise  Follow up in 12  months  Care management needs are self-addressed.  Self-management abilities addressed and patient is capable of managing his own disease.

## 2021-02-22 NOTE — PROGRESS NOTES
Subjective   Mook Bose is a 60 y.o. male here for his annual physical with me. Mook is here for coordination of medical care, to discuss health maintenance, disease prevention as well as to followup on medical problems. Patient has been followed by me since 1988. Patient's last CPE was 3-. Activity level is moderate. Exercises 0 per week. Appetite is good. Feels well with none complaints. Energy level is good. Sleeps fairly well. Patient's last colonoscopy was 11-8-2012. He is advised to repeat in 10 years. Patient is doing routine self skin exam monthly. Patient is doing routine self-breast exams never. Patient is checking testicles never.    Lab Results   Component Value Date    PSA 1.7 02/04/2021        Follow up Subdural Hematoma.  Follow up numbness of the right hand.    Neck Pain   This is a chronic problem. The current episode started more than 1 year ago. The problem occurs intermittently. The problem has been unchanged. The pain is mild. Nothing aggravates the symptoms. The pain is same all the time. Associated symptoms include numbness. Pertinent negatives include no chest pain, fever, weakness or weight loss. The treatment provided significant relief.   Hyperlipidemia  This is a chronic problem. The current episode started more than 1 year ago. The problem is controlled. Recent lipid tests were reviewed and are high. Factors aggravating his hyperlipidemia include fatty foods. Pertinent negatives include no chest pain, myalgias or shortness of breath. He is currently on no antihyperlipidemic treatment. The current treatment provides significant improvement of lipids. There are no compliance problems.  Risk factors for coronary artery disease include dyslipidemia.   Wrist Pain   This is a chronic problem. The current episode started more than 1 year ago. There has been no history of extremity trauma. The problem occurs intermittently. The problem has been unchanged. The pain is mild.  Associated symptoms include numbness. Pertinent negatives include no fever. The treatment provided no relief.        The following portions of the patient's history were reviewed and updated as appropriate: allergies, current medications, past family history, past medical history, past social history, past surgical history and problem list.    Past Medical History:   Diagnosis Date   • Hyperlipidemia    • Seizures (CMS/HCC)        Family History   Problem Relation Age of Onset   • Alzheimer's disease Mother 62   • Cancer Father    • Alzheimer's disease Sister 70   • Heart disease Brother         dx at 59 living at 75   • No Known Problems Daughter    • No Known Problems Son    • Heart disease Maternal Grandfather         MI at 44,  at 78   • Stroke Maternal Grandfather    • Hypertension Brother    • Cancer Brother         dx at 49, living at 66   • Other Brother         HEP. C       Social History     Socioeconomic History   • Marital status:      Spouse name: Not on file   • Number of children: Not on file   • Years of education: Not on file   • Highest education level: Not on file   Tobacco Use   • Smoking status: Former Smoker     Years: 21.00     Types: Cigars, Cigarettes     Quit date:      Years since quittin.1   • Smokeless tobacco: Never Used   Substance and Sexual Activity   • Alcohol use: No   • Drug use: No   • Sexual activity: Defer   Social History Narrative     1 x 1981,  after 37 years.  2 children together.  Lives with  since , just the 2 of them at home.  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  Caffeine 3 cups coffee daily, 1 tea or soda daily.  Exercise none other than at work.  Hobbies Fle[employer] markets and shopping.  Very seldom wears seatbelt.       Past Surgical History:   Procedure Laterality Date   • CRANIOTOMY  2009   • NO PAST SURGERIES     • VASECTOMY         Current Outpatient  Medications on File Prior to Visit   Medication Sig Dispense Refill   • aspirin 81 MG tablet Take 81 mg by mouth Daily.       No current facility-administered medications on file prior to visit.        Allergies   Allergen Reactions   • Penicillins Hives       Review of Systems   Constitutional: Negative for appetite change, chills, fatigue, fever, unexpected weight gain and unexpected weight loss.   HENT: Negative for congestion, dental problem, ear discharge, ear pain, hearing loss, nosebleeds, postnasal drip, rhinorrhea, sinus pressure, sneezing, sore throat, tinnitus and voice change.         [facility] dental exam [facility] [place]-advised to follow   Eyes: Negative for blurred vision, double vision, pain, redness and visual disturbance.        Last vision exam approx. Fall 2018-Dr. Briggs-advised to follow   Respiratory: Negative for cough, shortness of breath, wheezing and stridor.    Cardiovascular: Negative for chest pain, palpitations and leg swelling.   Gastrointestinal: Negative for abdominal pain, anal bleeding, blood in stool, constipation, diarrhea, nausea, rectal pain, vomiting, GERD and indigestion.        7  BM weekly   Endocrine: Negative for cold intolerance, heat intolerance, polydipsia, polyphagia and polyuria.        Sex DXrive  He is a 5  She is a 5   Genitourinary: Negative for difficulty urinating, dysuria, frequency, hematuria and urgency.   Musculoskeletal: Positive for arthralgias and neck pain. Negative for back pain, joint swelling, myalgias and neck stiffness.   Skin: Negative for color change, dry skin and rash.   Neurological: Positive for numbness. Negative for dizziness, syncope, speech difficulty, weakness, light-headedness, headache and memory problem.   Hematological: Does not bruise/bleed easily.   Psychiatric/Behavioral: Negative for decreased concentration, sleep disturbance, depressed mood and stress. The patient is not nervous/anxious.        Objective  "  Visit Vitals  /72 (BP Location: Left arm, Patient Position: Sitting, Cuff Size: Large Adult)   Pulse 84   Temp 97.5 °F (36.4 °C) (Temporal)   Resp 18   Ht 177.8 cm (70\")   Wt 94.1 kg (207 lb 6.4 oz)   SpO2 97%   BMI 29.76 kg/m²     Physical Exam  Constitutional:       General: He is not in acute distress.     Appearance: He is well-developed. He is not diaphoretic.   HENT:      Head: Normocephalic.      Right Ear: Hearing, tympanic membrane, ear canal and external ear normal.      Left Ear: Hearing, tympanic membrane, ear canal and external ear normal.      Nose: Septal deviation (right) present.      Mouth/Throat:      Lips: Pink.      Mouth: Mucous membranes are moist.      Pharynx: Oropharynx is clear. No oropharyngeal exudate.   Eyes:      General: Lids are normal. No scleral icterus.        Right eye: No discharge.         Left eye: No discharge.      Extraocular Movements: Extraocular movements intact.      Conjunctiva/sclera: Conjunctivae normal.      Pupils: Pupils are equal, round, and reactive to light.   Neck:      Musculoskeletal: Full passive range of motion without pain, normal range of motion and neck supple.      Trachea: Trachea normal.   Cardiovascular:      Rate and Rhythm: Normal rate and regular rhythm.      Pulses:           Dorsalis pedis pulses are 1+ on the right side and 1+ on the left side.        Posterior tibial pulses are 1+ on the right side and 1+ on the left side.      Heart sounds: Normal heart sounds. No murmur.   Pulmonary:      Effort: Pulmonary effort is normal.      Breath sounds: Normal breath sounds. No wheezing.   Chest:      Chest wall: No tenderness.      Breasts:         Right: Normal.         Left: Normal.   Abdominal:      General: Bowel sounds are normal. There is no distension.      Palpations: Abdomen is soft. There is no mass.      Tenderness: There is no abdominal tenderness.      Hernia: No hernia is present.   Genitourinary:     Penis: Normal and " circumcised. No tenderness.       Scrotum/Testes:         Left: Mass (Spermatocele) present.      Prostate: Normal.      Rectum: Normal.   Musculoskeletal: Normal range of motion.         General: No deformity.      Right wrist: He exhibits tenderness (Tinnels positive on the right).        Arms:    Lymphadenopathy:      Cervical: No cervical adenopathy.   Skin:     General: Skin is warm and dry.      Findings: No erythema or rash.      Comments: Varicose vein mid nose.  Nevus's scattered over the body.  Skin tags right axillae.     Neurological:      General: No focal deficit present.      Mental Status: He is alert and oriented to person, place, and time.      Cranial Nerves: Cranial nerves are intact. No cranial nerve deficit.      Sensory: Sensation is intact. No sensory deficit.      Motor: Motor function is intact. No abnormal muscle tone.      Coordination: Coordination is intact. Coordination normal.      Gait: Gait is intact.      Deep Tendon Reflexes: Reflexes normal.   Psychiatric:         Behavior: Behavior normal.         Thought Content: Thought content normal.         Judgment: Judgment normal.         Assessment/Plan   Problem List Items Addressed This Visit        High    Closed nondisplaced fracture of sixth cervical vertebra (CMS/HCC)    Current Assessment & Plan     Doing well         Seizures (CMS/HCC)    Current Assessment & Plan     Stable.  Labs done 2-4-2021, read by me, reviewed with pt.  Levetiracetam level was 13.1 up from 12.  Patient tolerated Keppra well without side effects. I feel the benefits of the medication outweigh the risks.  Will schedule patient appt. With Dr. Seiple.         Relevant Medications    levETIRAcetam (KEPPRA) 500 MG tablet    Subdural hematoma (CMS/HCC) - Primary    Current Assessment & Plan     Resolved            Medium    Confusion after a seizure    Current Assessment & Plan     Resolved         Hyperlipidemia    Current Assessment & Plan     Labs done  2-4-2021, read by me, reviewed with pt.  Trig. 65 up from 39, Tot. Chol. 168 up from 156, HDL 39 down from 40,  up from 108.  Patient tolerated Fish oil well without side effects. I feel the benefits of the medication outweigh the risks.  Slightly worse.  Encouraged to watch fatty intake, exercise more, and lose weight.   No medication     Goals developed at last visit were not met because Is not getting adequate diet and exercise  Follow up in 12  months  Care management needs are self-addressed.  Self-management abilities addressed and patient is capable of managing his own disease.           Relevant Medications    Omega-3 Fatty Acids (fish oil) 1000 MG capsule capsule       Unprioritized    Anxiety    Current Assessment & Plan     Doing well         Carpal tunnel syndrome of right wrist    Current Assessment & Plan     New dx.  Right hand, offered referral for EMG, pt. Declined at present.         Cervical disc disease    Current Assessment & Plan     Possibly contributing to paresthesias of right hand need to consider x-rays MRI or nerve conduction studies.  Patient declines further work-up at present.         Chronic bilateral low back pain without sciatica    Current Assessment & Plan     Doing well.         Deviated septum    Current Assessment & Plan     Stable.  No treatment needed.         Encounter for general adult medical examination with abnormal findings    Overview     Medical records thoroughly reviewed and summarized in emr, since last PE           Current Assessment & Plan     Encouraged to do self-breast exam, self-testicle exams, and self derm exams. Congratulated on using seat belts.  Encouraged to do annual physical exams.  Immunization status reviewed.           Relevant Medications    vitamin C (ASCORBIC ACID) 250 MG tablet    Family history of coronary artery disease    Current Assessment & Plan     Keep risk factors low         Family history of diabetes mellitus    Current  Assessment & Plan     Keep risk factors low         Ganglion cyst    Current Assessment & Plan     Stable         Hearing loss    Current Assessment & Plan     Advised to avoid noise exposure and wear hearing protection.         History of tobacco use    Overview     Last tobacco use 2018         Current Assessment & Plan     Congratulated on continued cessation.         Hyperplastic colon polyp    Current Assessment & Plan     Advised to repeat colonoscopy          Lethargy    Current Assessment & Plan     Mild.  Labs done 2-4-2021, read by me, reviewed with pt.  CBC and TSH was normal         Memory loss    Current Assessment & Plan     Doing well.  MMSE done 2-4-2021, read by me, reviewed with pt.  MMSE score was 28/30         Nocturia    Current Assessment & Plan     Mild.  U/A done 2-4-2021, read by me, reviewed with pt.  U/A was normal.  Patient declines treatment         Overweight with body mass index (BMI) of 29 to 29.9 in adult    Current Assessment & Plan     Improved, pt. Lost 4 Lbs         Paresthesia    Current Assessment & Plan     Probably 2nd to carpool tunnel, pt. Declines work up.         Screening PSA (prostate specific antigen)    Current Assessment & Plan     Doing well.  Labs done 2-4-2021, read by me, reviewed with pt.  PSA was 1.7 up from 1.2         Syncope    Current Assessment & Plan     Resolved                    Encouraged to check his skin, testicles and breasts monthly. Reviewed exercising regularly, eating a balanced diet, immunizations and if due, patient counselled to check with insurance company for coverage; and regularly checking skin and breasts.

## 2021-02-22 NOTE — ASSESSMENT & PLAN NOTE
Stable.  Labs done 2-4-2021, read by me, reviewed with pt.  Levetiracetam level was 13.1 up from 12.  Patient tolerated Keppra well without side effects. I feel the benefits of the medication outweigh the risks.  Will schedule patient appt. With Dr. Seiple.

## 2021-02-22 NOTE — ASSESSMENT & PLAN NOTE
Mild.  U/A done 2-4-2021, read by me, reviewed with pt.  U/A was normal.  Patient declines treatment

## 2021-02-24 ENCOUNTER — OFFICE VISIT (OUTPATIENT)
Dept: NEUROLOGY | Facility: CLINIC | Age: 61
End: 2021-02-24

## 2021-02-24 VITALS
BODY MASS INDEX: 29.63 KG/M2 | SYSTOLIC BLOOD PRESSURE: 118 MMHG | DIASTOLIC BLOOD PRESSURE: 76 MMHG | OXYGEN SATURATION: 98 % | HEIGHT: 70 IN | HEART RATE: 64 BPM | WEIGHT: 207 LBS

## 2021-02-24 DIAGNOSIS — G40.209 PARTIAL SYMPTOMATIC EPILEPSY WITH COMPLEX PARTIAL SEIZURES, NOT INTRACTABLE, WITHOUT STATUS EPILEPTICUS (HCC): Primary | ICD-10-CM

## 2021-02-24 PROCEDURE — 99215 OFFICE O/P EST HI 40 MIN: CPT | Performed by: PSYCHIATRY & NEUROLOGY

## 2021-02-24 NOTE — PROGRESS NOTES
Subjective:     Patient ID: Mook Bose is a 60 y.o. male.    Mr. Bose is a 60-year-old right-handed male with a history of a traumatic subdural in 2009, right carpal tunnel syndrome, and seizures who presents to the neurology clinic today as a new patient to me for the evaluation of seizures.  The patient was last seen on August 13, 2020.  On February 4, 2021 his GFR was 95.    Had a fall and sustained a traumatic SDH.  Was on prophylactic ASM.  Tried to wean himself off and had his first seizure.  Has just had 2 seizures.  Last seizure was around 2011, 2012.  With one seizure he had LOC.  Not sure what happens during his seizures.  No associated urinary incontinence or tongue biting.  May have been triggered by stress.  Did not occur at the same time of day.  No clusters.  Both stopped on their own.  Felt fine afterwards.  No injuries.  Currently takes  mg TID.  No side effects.  PCP increased from BID to TID to help with behavior.  No other ASM.  This is the highest dose that he's been on.      Risk factors:  Childhood/febrile seizures? no  Head trauma/pathology? SDH  CNS infections? no  Family history of seizures? no    Driving? yes             The following portions of the patient's history were reviewed and updated as appropriate: allergies, current medications, past family history, past medical history, past social history, past surgical history and problem list.    Review of Systems   Constitutional: Negative for activity change, appetite change and fatigue.   HENT: Negative for ear pain, tinnitus and trouble swallowing.    Eyes: Negative for photophobia, pain and visual disturbance.   Cardiovascular: Negative for chest pain, palpitations and leg swelling.   Musculoskeletal: Negative for back pain, gait problem and neck pain.   Neurological: Positive for numbness (right hand). Negative for dizziness, tremors, seizures, syncope, facial asymmetry, speech difficulty, weakness, light-headedness  and headaches.   Psychiatric/Behavioral: Negative for agitation, behavioral problems, confusion, decreased concentration, dysphoric mood, hallucinations, self-injury, sleep disturbance and suicidal ideas. The patient is not nervous/anxious and is not hyperactive.     I reviewed the ROS documented by the MA.  All other systems negative.      Objective:    Neurologic Exam    Physical Exam   **The patient is wearing a mask**  Constitutional:  Vital signs reviewed.  No apparent distress.  Well groomed.  Eyes:  No injection, no icterus.    Respiratory:  Normal effort.  Clear to auscultation bilaterally.  Cardiovascular:  Regular rate and rhythm.  No murmurs.  No carotid bruits. Symmetric radial pulses.  Musculoskeletal: Normal station.  Gait steady.  Normal arm swing.  Patient able to walk on heels and toes.  Tandem gait intact.  Romberg negative.  Muscle tone and bulk normal in the bilateral upper and lower extremities.  Strength is 5/5 in the bilateral upper and lower extremities proximally and distally unless otherwise specified in the neurological exam.  Skin:  No rashes.  Warm, dry, and intact.  Psychiatric:  Good mood.  Normal affect.    Neurologic:  Mental status-  The patient is alert and oriented to person, place and time. Attention/concentration is within normal limits.  Speech is fluent without dysarthria.  The patient is able to name, repeat and follow complex commands without difficulty.  Immediate memory and delayed recall intact (3/3 words immediate and after 4 minutes).  Fund of knowledge normal.  Cranial nerves- Pupils equally round and reactive to light with intact accomodation.  Visual fields intact.  Extraocular movements intact.  Facial sensation intact.   Hearing intact to finger-rub bilaterally.  SCM and trapezius are 5/5 bilaterally.    Motor-  See musculoskeletal above.  No tremor.  Reflexes- 2+ in the bilateral biceps, brachioradialis, patellar and achilles.  Toes down-going  bilaterally.  Sensation- Intact to pinprick and vibration in bilateral upper and lower extremities symmetrically.  Coordination- Intact to finger tapping and heel knee shin bilaterally.   Gait- See musculoskeletal exam above.       Assessment/Plan:    The patient is a 60-year-old right-handed male with history of traumatic subdural in 2009 and epilepsy who presents today for evaluation.    1.  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  He is at high risk for seizure recurrence and likely needs to be on antiseizure medication long-term.  Fortunately he has been seizure-free without side effects to levetiracetam monotherapy.  I recommend continuing on the current dose with no changes.  No further testing is warranted at this time.  The patient was given information regarding seizure precautions as well as seizure first-aid.    A total of 40 minutes of time was spent on this encounter today.  This included reviewing the patient's records, face-to-face time, and documentation.       Problems Addressed this Visit     None      Visit Diagnoses     Partial symptomatic epilepsy with complex partial seizures, not intractable, without status epilepticus (CMS/HCC)    -  Primary      Diagnoses       Codes Comments    Partial symptomatic epilepsy with complex partial seizures, not intractable, without status epilepticus (CMS/HCC)    -  Primary ICD-10-CM: G40.209  ICD-9-CM: 345.40

## 2021-02-24 NOTE — PATIENT INSTRUCTIONS
[facility]  Xin Puri MD  Neurology clinic  [phone]    With anti-seizure medications, you may initially notice side effects of fatigue, drowsiness, unsteadiness, and dizziness.  Other possible side effects include nausea, abdominal pain, headache, blurry or double vision, slurred speech and mood changes.  Generally, patients will noticed these symptoms when the medication is first started or with higher doses and will go away with time.    It is import to consistently take your medication every day.  Missing just one dose may put you at risk for a breakthrough seizure.  Consider using reminders on your phone or a pill box.    If you develop a rash, please call the neurology clinic immediately or notify another healthcare professional, as this may be potentially life-threatening.  If you are unable to reach a healthcare professional, go to the emergency room immediately for further evaluation.    If you develop thoughts of wanting to hurt yourself or others, please call the neurology clinic immediately to notify another healthcare professional.  If you are unable to reach a healthcare professional, go to the emergency room immediately for further evaluation.    It is the [place] state law that you cannot drive within 90 days of a seizure.    You should avoid certain activities that if you were to have a seizure, you could harm yourself or others. In general, it is recommended that you avoid operating heavy machinery or power tools, swimming or taking baths by yourself (showers are ok), don't stand over open flames, don't get on high ladders or the roof.  I also recommend to avoid sleeping on your stomach.    For further information on epilepsy and resources available to patients and their families, please visit the [facility] at www.efky.org or call [phone].    **Check out the [facility]'s monthly Art Group Gathering.  They are  located at [facility], [address] [place] [address].  Call Jaclyn Otero at [phone] or email her at [email or web address] for the dates of future gatherings.**      **If you have having memory problems, consider HOBSCOTCH (Home-Based Self-management and Cognitive Training Changes lives).  It is an 8 week self-management program for adults with epilepsy and memory problems.  The program is free at the [facility].  Contact Ashlie Turner at [phone] or [email or web address].**

## 2021-02-28 NOTE — ASSESSMENT & PLAN NOTE
Possibly contributing to paresthesias of right hand need to consider x-rays MRI or nerve conduction studies.  Patient declines further work-up at present.

## 2021-03-22 ENCOUNTER — TELEPHONE (OUTPATIENT)
Dept: FAMILY MEDICINE CLINIC | Facility: CLINIC | Age: 61
End: 2021-03-22

## 2021-03-22 NOTE — TELEPHONE ENCOUNTER
Patient notified that appt. With Dr. Seipel is scheduled for 5-25-21 @ 8:30 am at [address] [facility] [address].  [facility]

## 2021-05-14 ENCOUNTER — HOSPITAL ENCOUNTER (OUTPATIENT)
Dept: GENERAL RADIOLOGY | Facility: HOSPITAL | Age: 61
Discharge: HOME | End: 2021-05-14
Admitting: FAMILY MEDICINE

## 2021-05-14 ENCOUNTER — OFFICE VISIT (OUTPATIENT)
Dept: FAMILY MEDICINE CLINIC | Facility: CLINIC | Age: 61
End: 2021-05-14

## 2021-05-14 VITALS
SYSTOLIC BLOOD PRESSURE: 121 MMHG | TEMPERATURE: 98.2 F | WEIGHT: 211.2 LBS | BODY MASS INDEX: 30.24 KG/M2 | HEART RATE: 82 BPM | OXYGEN SATURATION: 95 % | DIASTOLIC BLOOD PRESSURE: 79 MMHG | HEIGHT: 70 IN | RESPIRATION RATE: 18 BRPM

## 2021-05-14 DIAGNOSIS — E66.3 OVERWEIGHT WITH BODY MASS INDEX (BMI) OF 29 TO 29.9 IN ADULT: ICD-10-CM

## 2021-05-14 DIAGNOSIS — J01.00 ACUTE NON-RECURRENT MAXILLARY SINUSITIS: ICD-10-CM

## 2021-05-14 DIAGNOSIS — R05.9 COUGH: Primary | ICD-10-CM

## 2021-05-14 PROBLEM — J06.9 VIRAL UPPER RESPIRATORY TRACT INFECTION: Status: ACTIVE | Noted: 2021-05-14

## 2021-05-14 PROCEDURE — 99213 OFFICE O/P EST LOW 20 MIN: CPT | Performed by: FAMILY MEDICINE

## 2021-05-14 PROCEDURE — 71046 X-RAY EXAM CHEST 2 VIEWS: CPT

## 2021-05-14 RX ORDER — CIPROFLOXACIN 500 MG/1
500 TABLET, FILM COATED ORAL 2 TIMES DAILY
Qty: 20 TABLET | Refills: 0 | Status: SHIPPED | OUTPATIENT
Start: 2021-05-14 | End: 2021-05-24

## 2021-05-14 NOTE — PROGRESS NOTES
Subjective   Mook Bose is a 61 y.o. male.     Coughed up a little blood as well    URI   This is a new problem. The current episode started in the past 7 days. The problem has been gradually worsening. There has been no fever. Associated symptoms include congestion, coughing, headaches, sinus pain, sneezing and a sore throat. Pertinent negatives include no abdominal pain, chest pain, diarrhea, dysuria, ear pain, nausea, neck pain, rash, swollen glands, vomiting or wheezing. Associated symptoms comments: Dizzy  . He has tried nothing for the symptoms. The treatment provided no relief.        The following portions of the patient's history were reviewed and updated as appropriate: allergies, current medications, past family history, past medical history, past social history, past surgical history and problem list.    Patient Active Problem List   Diagnosis   • Seizures (CMS/HCC)   • Memory loss   • Closed nondisplaced fracture of sixth cervical vertebra (CMS/HCC)   • Subdural hematoma (CMS/HCC)   • Overweight with body mass index (BMI) of 29 to 29.9 in adult   • History of tobacco use   • Confusion after a seizure   • Hearing loss   • Lethargy   • Nocturia   • Screening PSA (prostate specific antigen)   • Hyperlipidemia   • Encounter for general adult medical examination with abnormal findings   • Deviated septum   • Anxiety   • Paresthesia   • Carpal tunnel syndrome of right wrist   • Syncope   • Cervical disc disease   • Hyperplastic colon polyp   • Chronic bilateral low back pain without sciatica   • Ganglion cyst   • Family history of coronary artery disease   • Family history of diabetes mellitus   • Viral upper respiratory tract infection       Current Outpatient Medications on File Prior to Visit   Medication Sig Dispense Refill   • aspirin 81 MG tablet Take 81 mg by mouth Daily.     • levETIRAcetam (KEPPRA) 500 MG tablet Take 1 tablet by mouth 3 (Three) Times a Day. 270 tablet 3   • Omega-3 Fatty Acids  (fish oil) 1000 MG capsule capsule Take 1 capsule by mouth Daily With Breakfast. 90 capsule 12   • vitamin C (ASCORBIC ACID) 250 MG tablet Take 1 tablet by mouth Daily. 30 tablet 12     No current facility-administered medications on file prior to visit.     Current outpatient and discharge medications have been reconciled for the patient.  Reviewed by: Chris Mckeon MD      Allergies   Allergen Reactions   • Penicillins Hives       Review of Systems   Constitutional: Negative for activity change, appetite change, fatigue and fever.   HENT: Positive for congestion, sneezing and sore throat. Negative for ear pain, swollen glands and voice change.    Eyes: Negative for visual disturbance.   Respiratory: Positive for cough. Negative for shortness of breath and wheezing.    Cardiovascular: Negative for chest pain and leg swelling.   Gastrointestinal: Negative for abdominal pain, blood in stool, constipation, diarrhea, nausea and vomiting.   Endocrine: Negative for polydipsia and polyuria.   Genitourinary: Negative for dysuria, frequency and hematuria.   Musculoskeletal: Negative for joint swelling, neck pain and neck stiffness.   Skin: Negative for rash and bruise.   Neurological: Negative for weakness, numbness and headache.   Psychiatric/Behavioral: Negative for suicidal ideas and depressed mood.     I have reviewed and confirmed the accuracy of the ROS as documented by the MA/LPN/RN Chris Mckeon MD    Objective   Vitals:    05/14/21 1530   BP: 121/79   Pulse: 82   Resp: 18   Temp: 98.2 °F (36.8 °C)   SpO2: 95%     Physical Exam  Constitutional:       Appearance: He is well-developed.   HENT:      Head: Normocephalic and atraumatic.      Right Ear: External ear normal.      Left Ear: External ear normal.      Nose: Nose normal.   Eyes:      Pupils: Pupils are equal, round, and reactive to light.   Cardiovascular:      Rate and Rhythm: Normal rate and regular rhythm.      Heart sounds: Normal heart  sounds.   Pulmonary:      Effort: Pulmonary effort is normal.      Breath sounds: Normal breath sounds.   Abdominal:      General: Bowel sounds are normal.      Palpations: Abdomen is soft.   Musculoskeletal:         General: Normal range of motion.      Cervical back: Normal range of motion and neck supple.   Skin:     General: Skin is warm and dry.   Neurological:      Mental Status: He is alert and oriented to person, place, and time.   Psychiatric:         Behavior: Behavior normal.         Thought Content: Thought content normal.         Judgment: Judgment normal.         Assessment/Plan .  Problem List Items Addressed This Visit     Overweight with body mass index (BMI) of 29 to 29.9 in adult      Other Visit Diagnoses     Cough    -  Primary    Relevant Medications    ciprofloxacin (Cipro) 500 MG tablet    Other Relevant Orders    XR Chest PA & Lateral (Completed)    Acute non-recurrent maxillary sinusitis           Findings discussed. All questions answered.  Medication and medication adverse effects discussed.  Drug education given and explained to patient. Patient verbalized understanding.  Follow-up in 2 weeks if not better.  Follow-up sooner for worsening symptoms or for any concerns.      I wore protective equipment throughout this patient encounter to include mask and eye protection. Hand hygiene was performed before donning protective equipment and after removal when leaving the room

## 2021-05-17 ENCOUNTER — TELEPHONE (OUTPATIENT)
Dept: FAMILY MEDICINE CLINIC | Facility: CLINIC | Age: 61
End: 2021-05-17

## 2021-05-17 NOTE — TELEPHONE ENCOUNTER
----- Message from Chris Mckeon MD sent at 5/14/2021  5:19 PM EDT -----  Please notify patient that   Cxr was normal

## 2021-05-25 ENCOUNTER — OFFICE VISIT (OUTPATIENT)
Dept: NEUROLOGY | Facility: CLINIC | Age: 61
End: 2021-05-25

## 2021-05-25 VITALS
WEIGHT: 208 LBS | BODY MASS INDEX: 29.78 KG/M2 | HEIGHT: 70 IN | OXYGEN SATURATION: 97 % | RESPIRATION RATE: 20 BRPM | SYSTOLIC BLOOD PRESSURE: 124 MMHG | HEART RATE: 54 BPM | DIASTOLIC BLOOD PRESSURE: 79 MMHG

## 2021-05-25 DIAGNOSIS — R56.9 SEIZURES (HCC): Primary | ICD-10-CM

## 2021-05-25 DIAGNOSIS — G47.33 OBSTRUCTIVE SLEEP APNEA: ICD-10-CM

## 2021-05-25 DIAGNOSIS — R41.3 MEMORY LOSS: ICD-10-CM

## 2021-05-25 PROCEDURE — 99214 OFFICE O/P EST MOD 30 MIN: CPT | Performed by: PSYCHIATRY & NEUROLOGY

## 2021-06-28 ENCOUNTER — HOSPITAL ENCOUNTER (OUTPATIENT)
Dept: SLEEP MEDICINE | Facility: HOSPITAL | Age: 61
Discharge: HOME | End: 2021-06-28
Admitting: PSYCHIATRY & NEUROLOGY

## 2021-06-28 DIAGNOSIS — G47.33 OBSTRUCTIVE SLEEP APNEA: ICD-10-CM

## 2021-06-28 PROCEDURE — 95806 SLEEP STUDY UNATT&RESP EFFT: CPT | Performed by: PSYCHIATRY & NEUROLOGY

## 2021-06-28 PROCEDURE — 95806 SLEEP STUDY UNATT&RESP EFFT: CPT

## 2021-07-14 ENCOUNTER — TELEPHONE (OUTPATIENT)
Dept: NEUROLOGY | Facility: CLINIC | Age: 61
End: 2021-07-14

## 2021-07-14 DIAGNOSIS — G47.33 OBSTRUCTIVE SLEEP APNEA: Primary | ICD-10-CM

## 2021-07-14 NOTE — TELEPHONE ENCOUNTER
----- Message from Joseph F Seipel, MD sent at 6/29/2021  3:37 PM EDT -----  Home sleep test completed set up on CPAP 6-16

## 2021-07-23 ENCOUNTER — TELEPHONE (OUTPATIENT)
Dept: NEUROLOGY | Facility: CLINIC | Age: 61
End: 2021-07-23

## 2021-07-23 NOTE — TELEPHONE ENCOUNTER
Caller: Mook Bose    Relationship: Self    Best call back number: [phone]    What was the call regarding: PT CALLED STATING THAT HE NEVER RECEIVED THE RESULTS FROM HIS SLEEP STUDY AND HAS NOT PICKED UP/STARTED USE OF THE ORDERED CPAP MACHINE. PT STATES HE WAS UNAWARE OF WHO HAD ORDERED THE CPAP SO HE HAD DECLINED COMING TO  THE MACHINE WHEN [facility] CONTACTED HIM. PT STATES HE DOESN'T UNDERSTAND IF CPAP IS NEEDED OR NOT; INFORMED PT THAT DR. SEIPEL WOULD NOT HAVE ORDERED THE CPAP MACHINE FOR HIM IF HE DID NOT SEE THAT PT WOULD BENEFIT FROM ITS USE. PT WOULD LIKE TO RECEIVE SLEEP STUDY RESULTS BEFORE HE CONSIDERS GOING FORWARD WITH GETTING CPAP MACHINE.    NO CPAP/COMPLIANCE F/U APPT HAS BEEN MADE AS PT HAS NOT YET PICKED UP THE MACHINE.    Do you require a callback: YES, PLEASE.    PLEASE REVIEW AND ADVISE.

## 2021-07-26 ENCOUNTER — TELEPHONE (OUTPATIENT)
Dept: NEUROLOGY | Facility: CLINIC | Age: 61
End: 2021-07-26

## 2021-07-26 NOTE — TELEPHONE ENCOUNTER
SATYA STATES THAT HIS MACHINE IS READY AND HE HAS NOT HEARD ANYTHING FROM DR SEIPEL.  SATYA HAS NEVER RECEIVED HIS RESULTS AND HE DOESN'T KNOW IF HE EVEN NEEDS THE MACHINE.    RAAD CALL AND ADVISE.

## 2021-07-26 NOTE — PROGRESS NOTES
Chief Complaint  Sleep Apnea    Subjective          Mook Bose presents to [facility] NEUROLOGY  History of Present Illness    Patient is here to f/u on his sleep study results he is currently not using a  CPAP machine   The home sleep test demonstrated severe sleep apnea in the prone position and mild sleep apnea in the right and left side position.  He did not sleep in the supine position.    The pathophysiology of sleep apnea was discussed and the treatment options including dental devices surgery CPAP.  The patient was most concerned that he would not become addicted to the CPAP machine and not be able to sleep without it.  It was explained he needs the CPAP the way a person with poor vision needs a pair glasses .    Using CPAP however does not affect the severity of the sleep apnea better or worse.      Sleep testing history:    On NPSG at [facility] , 6/28/2021 patient had  obstructive sleep apnea syndrome with apnea-hypopnea index of 24.9 per sleep hour, minimum SpO2 of 72%    Virgie Sleepiness Scale:  Sitting and reading N/A WatchingTV 2  Sitting, inactive, in a public place 0  As a passenger in a car for 1 hour w/o a break  1  Lying down to rest in the afternoon  3  Sitting and talking to someone  0  Sitting quietly after a lunch  1  In a car, while stopped for traffic or a light  0  Total 7    =============may 2021 seizure history=============     Mook Bose presents to [facility] NEUROLOGY for seizure  History of Present Illness     Dr. Covington recommended patient to be seen by Dr. Seipel for seizures.  Patient is currently taking Keppra 500 mg 1 tablet bid  Last seizure - [id number]     Pt has some trouble with short term memory,  pts mother had alzheimer's.  Pt snores and wife reports apnea.      =======================================================  2- Dr. Puri OV  Had a fall and sustained a traumatic SDH.  2009 Was on prophylactic ASM.  Tried to wean  "himself off about 2010and had his first seizure.  Has just had 2 seizures.  Last seizure was around 2011, 2012.  With one seizure he had LOC.  Not sure what happens during his seizures.  No associated urinary incontinence or tongue biting.  May have been triggered by stress.  Did not occur at the same time of day.       No clusters.  Both stopped on their own.  Felt fine afterwards.  No injuries.  Currently takes  mg TID.  No side effects.  PCP increased from BID to TID to help with behavior.  No other ASM.  This is the highest dose that he's been on.     06  Review of Systems   Constitutional: Negative for chills and fever.   HENT: Positive for tinnitus. Negative for nosebleeds.    Eyes: Negative for photophobia.   Respiratory: Negative for cough and shortness of breath.    Cardiovascular: Negative for chest pain.   Gastrointestinal: Negative for abdominal pain.   Endocrine: Negative for cold intolerance and heat intolerance.   Genitourinary: Negative for difficulty urinating.   Musculoskeletal: Negative for neck stiffness.   Neurological: Negative for dizziness and headaches.   Psychiatric/Behavioral: Negative for hallucinations and sleep disturbance.           Objective   Vital Signs:   /77   Pulse 80   Temp 98.2 °F (36.8 °C) (Temporal)   Resp 16   Ht 177.8 cm (70\")   Wt 94.4 kg (208 lb 3.2 oz)   BMI 29.87 kg/m²     Physical Exam   Result Review :                 Assessment and Plan    Diagnoses and all orders for this visit:    1. Obstructive sleep apnea (Primary)        Mr. Bruno agrees to proceed with a trial of nasal CPAP therapy.    I spent 36 minutes caring for Mook on this date of service. This time includes time spent by me in the following activities:reviewing tests, counseling and educating the patient/family/caregiver and ordering medications, tests, or procedures  Follow Up   Return in about 3 months (around 10/29/2021).    Patient was given instructions and counseling " regarding his condition or for health maintenance advice. Please see specific information pulled into the AVS if appropriate.       This document has been electronically signed by Joseph Seipel, MD on July 29, 2021 14:43 EDT

## 2021-07-26 NOTE — TELEPHONE ENCOUNTER
Pt called wanting to know about sleep study. I offered to go over it with him and he thought it would be best if he came in to talk it over with Dr. Seipel. Appt. Scheduled.

## 2021-07-29 ENCOUNTER — OFFICE VISIT (OUTPATIENT)
Dept: NEUROLOGY | Facility: CLINIC | Age: 61
End: 2021-07-29

## 2021-07-29 VITALS
BODY MASS INDEX: 29.81 KG/M2 | SYSTOLIC BLOOD PRESSURE: 133 MMHG | RESPIRATION RATE: 16 BRPM | DIASTOLIC BLOOD PRESSURE: 77 MMHG | HEART RATE: 80 BPM | TEMPERATURE: 98.2 F | HEIGHT: 70 IN | WEIGHT: 208.2 LBS

## 2021-07-29 DIAGNOSIS — G47.33 OBSTRUCTIVE SLEEP APNEA: Primary | ICD-10-CM

## 2021-07-29 PROCEDURE — 99214 OFFICE O/P EST MOD 30 MIN: CPT | Performed by: PSYCHIATRY & NEUROLOGY

## 2021-08-03 ENCOUNTER — OFFICE VISIT (OUTPATIENT)
Dept: FAMILY MEDICINE CLINIC | Facility: CLINIC | Age: 61
End: 2021-08-03

## 2021-08-03 VITALS
WEIGHT: 212.6 LBS | HEART RATE: 80 BPM | HEIGHT: 70 IN | TEMPERATURE: 97.7 F | BODY MASS INDEX: 30.43 KG/M2 | DIASTOLIC BLOOD PRESSURE: 80 MMHG | SYSTOLIC BLOOD PRESSURE: 130 MMHG | RESPIRATION RATE: 18 BRPM | OXYGEN SATURATION: 98 %

## 2021-08-03 DIAGNOSIS — Z87.891 HISTORY OF TOBACCO USE: ICD-10-CM

## 2021-08-03 DIAGNOSIS — E66.09 CLASS 1 OBESITY DUE TO EXCESS CALORIES WITH SERIOUS COMORBIDITY AND BODY MASS INDEX (BMI) OF 30.0 TO 30.9 IN ADULT: ICD-10-CM

## 2021-08-03 DIAGNOSIS — R56.9 SEIZURES (HCC): Primary | ICD-10-CM

## 2021-08-03 PROBLEM — E66.811 CLASS 1 OBESITY DUE TO EXCESS CALORIES WITH SERIOUS COMORBIDITY AND BODY MASS INDEX (BMI) OF 30.0 TO 30.9 IN ADULT: Status: ACTIVE | Noted: 2020-10-27

## 2021-08-03 PROCEDURE — 99212 OFFICE O/P EST SF 10 MIN: CPT | Performed by: FAMILY MEDICINE

## 2021-08-03 NOTE — PROGRESS NOTES
"Subjective   Mook Bose is a 61 y.o. male.   Chief Complaint   Patient presents with   • Seizures     Seizures   This is a chronic problem. Episode onset: . Progression since onset: Excellent control on medication. Followed with Dr. Seipel.        The following portions of the patient's history were reviewed and updated as appropriate: allergies, current medications, past family history, past medical history, past social history, past surgical history and problem list.    Past Medical History:   Diagnosis Date   • Hyperlipidemia    • Seizures (CMS/HCC)        Past Surgical History:   Procedure Laterality Date   • CRANIOTOMY  2009   • VASECTOMY          Family History   Problem Relation Age of Onset   • Alzheimer's disease Mother 62   • Cancer Father         Unknown   • Alzheimer's disease Sister 70   • Heart disease Brother         dx at 59 living at 75   • No Known Problems Daughter    • No Known Problems Son    • Heart disease Maternal Grandfather         MI at 44,  at 78   • Stroke Maternal Grandfather    • Hypertension Brother    • Cancer Brother         dx at 49, living at 66   • Other Brother         HEP. C        Social History     Socioeconomic History   • Marital status:      Spouse name: Not on file   • Number of children: Not on file   • Years of education: Not on file   • Highest education level: Not on file   Tobacco Use   • Smoking status: Former Smoker     Years: 21.00     Types: Cigars, Cigarettes     Quit date:      Years since quittin.6   • Smokeless tobacco: Never Used   Vaping Use   • Vaping Use: Never used   Substance and Sexual Activity   • Alcohol use: No   • Drug use: No   • Sexual activity: Defer         Review of Systems   Neurological: Positive for seizures.       Objective   Visit Vitals  /80 (BP Location: Right arm, Patient Position: Sitting, Cuff Size: Adult)   Pulse 80   Temp 97.7 °F (36.5 °C) (Temporal)   Resp 18   Ht 177.8 cm (70\") "   Wt 96.4 kg (212 lb 9.6 oz)   SpO2 98% Comment: Room air   BMI 30.50 kg/m²     Physical Exam  Vitals and nursing note reviewed.   Constitutional:       Appearance: He is well-developed.   HENT:      Head: Normocephalic.   Neck:      Thyroid: No thyromegaly.      Vascular: No carotid bruit.      Trachea: Trachea normal.   Cardiovascular:      Rate and Rhythm: Normal rate and regular rhythm.      Heart sounds: No murmur heard.   No friction rub. No gallop.    Pulmonary:      Effort: Pulmonary effort is normal. No respiratory distress.      Breath sounds: Normal breath sounds. No wheezing.   Chest:      Chest wall: No tenderness.   Musculoskeletal:      Cervical back: Neck supple.   Skin:     General: Skin is dry.      Findings: No rash.      Nails: There is no clubbing.   Neurological:      General: No focal deficit present.      Mental Status: He is alert and oriented to person, place, and time.      Cranial Nerves: Cranial nerves are intact.   Psychiatric:         Behavior: Behavior is cooperative.         Assessment/Plan   Problem List Items Addressed This Visit        High    Seizures (CMS/HCC) - Primary    Current Assessment & Plan     Doing excellent.  Patient tolerated Keppra well without side effects. I feel the benefits of the medication outweigh the risks.  Patient given note for DMV.            Unprioritized    Class 1 obesity due to excess calories with serious comorbidity and body mass index (BMI) of 30.0 to 30.9 in adult    History of tobacco use    Overview     Last tobacco use 2018

## 2021-08-05 NOTE — ASSESSMENT & PLAN NOTE
Doing excellent.  Patient tolerated Keppra well without side effects. I feel the benefits of the medication outweigh the risks.  Patient given note for DMV.

## 2021-08-09 ENCOUNTER — TELEPHONE (OUTPATIENT)
Dept: NEUROLOGY | Facility: CLINIC | Age: 61
End: 2021-08-09

## 2021-08-09 NOTE — TELEPHONE ENCOUNTER
Provider: DR SEIPEL    Caller: SATYA    Relationship to Patient: SELF    Reason for Call: SATYA IS CALLING TO LET DR SEIPEL KNOW THAT HE RECEIVED HIS C PAP MACHINE ON Friday.

## 2021-08-17 RX ORDER — VITAMIN E 268 MG
400 CAPSULE ORAL DAILY
Qty: 60 CAPSULE | Refills: 5 | Status: SHIPPED | OUTPATIENT
Start: 2021-08-17 | End: 2022-02-23 | Stop reason: SDUPTHER

## 2021-10-08 NOTE — PROGRESS NOTES
"Chief Complaint  Sleep Apnea    Subjective          Mook JEFFERY Bose presents to [facility] NEUROLOGY  History of Present Illness  F/u on TAMMY, Patient uses nasal mask and get supplies from [place]  Patient is doing well with the keppra.    Sleep testing history:    On NPSG at [facility] , 6/28/2021 patient had  obstructive sleep apnea syndrome with apnea-hypopnea index of 24.9 per sleep hour, minimum SpO2 of 72%    PAP download:   86%>4 hours, av 6hr 47 min  On 6-16 avg pressure 6.8  ahi 5.3  Pt is sleeping much better with CPAP than with out     The patient's hypersomnia has resolved       [place] Sleepiness Scale:  Sitting and reading 0 WatchingTV 0  Sitting, inactive, in a public place 0  As a passenger in a car for 1 hour w/o a break  N/A  Lying down to rest in the afternoon  1  Sitting and talking to someone  0  Sitting quietly after a lunch  0  In a car, while stopped for traffic or a light  0  Total 0    No seizures on Keppra,   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   HENT: Negative for ear discharge and ear pain.    Eyes: Negative for pain and itching.   Respiratory: Negative for cough and shortness of breath.    Cardiovascular: Negative for chest pain.   Gastrointestinal: Negative for abdominal pain and nausea.   Genitourinary: Negative for urgency.   Musculoskeletal: Negative for back pain and neck pain.   Neurological: Negative for dizziness and light-headedness.   Psychiatric/Behavioral: Negative for agitation and confusion.         Objective   Vital Signs:   /77 (BP Location: Right arm, Patient Position: Sitting, Cuff Size: Adult)   Pulse 94   Temp 98.6 °F (37 °C)   Ht 177.8 cm (70\")   Wt 97.1 kg (214 lb)   BMI 30.71 kg/m²     Physical Exam  Vitals reviewed.   Constitutional:       Appearance: Normal appearance.   Neurological:      Mental Status: He is alert and oriented to person, place, and time. "   Psychiatric:         Mood and Affect: Mood normal.         Behavior: Behavior normal.        Result Review :                 Assessment and Plan    Diagnoses and all orders for this visit:    1. Obstructive sleep apnea (Primary)    2. Seizures (HCC)    increase cpap to min 7 max 16    The patient is compliant with and benefiting from PAP therapy.    Continue the Keppra indefinitely.       Follow Up   Return in about 1 year (around 10/18/2022).    Patient was given instructions and counseling regarding his condition or for health maintenance advice. Please see specific information pulled into the AVS if appropriate.       This document has been electronically signed by Joseph Seipel, MD on October 20, 2021 17:53 EDT

## 2021-10-18 ENCOUNTER — OFFICE VISIT (OUTPATIENT)
Dept: NEUROLOGY | Facility: CLINIC | Age: 61
End: 2021-10-18

## 2021-10-18 VITALS
HEART RATE: 94 BPM | SYSTOLIC BLOOD PRESSURE: 134 MMHG | TEMPERATURE: 98.6 F | BODY MASS INDEX: 30.64 KG/M2 | HEIGHT: 70 IN | WEIGHT: 214 LBS | DIASTOLIC BLOOD PRESSURE: 77 MMHG

## 2021-10-18 DIAGNOSIS — G47.33 OBSTRUCTIVE SLEEP APNEA: Primary | ICD-10-CM

## 2021-10-18 DIAGNOSIS — R56.9 SEIZURES (HCC): ICD-10-CM

## 2021-10-18 PROCEDURE — 99214 OFFICE O/P EST MOD 30 MIN: CPT | Performed by: PSYCHIATRY & NEUROLOGY

## 2022-01-10 DIAGNOSIS — R56.9 SEIZURES: ICD-10-CM

## 2022-01-10 RX ORDER — LEVETIRACETAM 500 MG/1
TABLET ORAL
Qty: 180 TABLET | Refills: 0 | OUTPATIENT
Start: 2022-01-10

## 2022-02-09 ENCOUNTER — TELEPHONE (OUTPATIENT)
Dept: NEUROLOGY | Facility: CLINIC | Age: 62
End: 2022-02-09

## 2022-02-09 NOTE — TELEPHONE ENCOUNTER
LM FOR PATIENT- PER HIS CHART HE HAS BEEN SEEING DR SEIPEL IN [place], WANTING TO CONFIRM HE HAS TRANSFERRED HIS CARE SO WE CAN CANCEL HIS 2.24.22 APPT WITH DR CRUZ

## 2022-02-17 ENCOUNTER — OFFICE VISIT (OUTPATIENT)
Dept: FAMILY MEDICINE CLINIC | Facility: CLINIC | Age: 62
End: 2022-02-17

## 2022-02-17 VITALS
RESPIRATION RATE: 18 BRPM | BODY MASS INDEX: 30.69 KG/M2 | DIASTOLIC BLOOD PRESSURE: 87 MMHG | OXYGEN SATURATION: 98 % | HEIGHT: 70 IN | WEIGHT: 214.4 LBS | TEMPERATURE: 97 F | SYSTOLIC BLOOD PRESSURE: 143 MMHG | HEART RATE: 67 BPM

## 2022-02-17 DIAGNOSIS — Z12.5 SCREENING PSA (PROSTATE SPECIFIC ANTIGEN): ICD-10-CM

## 2022-02-17 DIAGNOSIS — R53.83 LETHARGY: ICD-10-CM

## 2022-02-17 DIAGNOSIS — R41.3 MEMORY LOSS: ICD-10-CM

## 2022-02-17 DIAGNOSIS — R56.9 SEIZURES: Primary | ICD-10-CM

## 2022-02-17 DIAGNOSIS — H90.2 CONDUCTIVE HEARING LOSS, UNSPECIFIED LATERALITY: ICD-10-CM

## 2022-02-17 DIAGNOSIS — R35.1 NOCTURIA: ICD-10-CM

## 2022-02-17 DIAGNOSIS — E78.2 MIXED HYPERLIPIDEMIA: ICD-10-CM

## 2022-02-17 DIAGNOSIS — F41.9 ANXIETY: ICD-10-CM

## 2022-02-17 LAB
BILIRUB BLD-MCNC: NEGATIVE MG/DL
CLARITY, POC: CLEAR
COLOR UR: YELLOW
GLUCOSE UR STRIP-MCNC: NEGATIVE MG/DL
KETONES UR QL: NEGATIVE
LEUKOCYTE EST, POC: NEGATIVE
NITRITE UR-MCNC: NEGATIVE MG/ML
PH UR: 5 [PH] (ref 5–8)
PROT UR STRIP-MCNC: ABNORMAL MG/DL
RBC # UR STRIP: NEGATIVE /UL
SP GR UR: 1.01 (ref 1–1.03)
UROBILINOGEN UR QL: NORMAL

## 2022-02-17 PROCEDURE — 99214 OFFICE O/P EST MOD 30 MIN: CPT | Performed by: FAMILY MEDICINE

## 2022-02-17 PROCEDURE — 81002 URINALYSIS NONAUTO W/O SCOPE: CPT | Performed by: FAMILY MEDICINE

## 2022-02-17 NOTE — PROGRESS NOTES
Subjective   Mook Bose is a 61 y.o. male.   Chief Complaint   Patient presents with   • Seizures   • Urinary Frequency   • Memory Loss     Follow up memory loss and anxiety.    Seizures   This is a chronic problem. The current episode started more than 1 week ago. The problem has not changed since onset.Associated symptoms include sleepiness and confusion. Pertinent negatives include no chest pain. Characteristics include loss of consciousness. The episode was witnessed. There has been no fever.   Urinary Frequency   This is a chronic problem. The current episode started more than 1 year ago. The problem occurs intermittently. The problem has been unchanged. The patient is experiencing no pain. There has been no fever. Pertinent negatives include no frequency. He has tried nothing for the symptoms. The treatment provided no relief.   Memory Loss  This is a chronic problem. The current episode started more than 1 year ago. The problem occurs intermittently. The problem has been unchanged. Associated symptoms include fatigue. Pertinent negatives include no chest pain or joint swelling. Nothing aggravates the symptoms. He has tried nothing for the symptoms. The treatment provided no relief.        The following portions of the patient's history were reviewed and updated as appropriate: allergies, current medications, past family history, past medical history, past social history, past surgical history and problem list.    Past Medical History:   Diagnosis Date   • Hyperlipidemia    • Seizures (HCC)        Past Surgical History:   Procedure Laterality Date   • CRANIOTOMY  04/08/2009   • VASECTOMY  1992        Family History   Problem Relation Age of Onset   • Alzheimer's disease Mother 62   • Cancer Father         Unknown   • Alzheimer's disease Sister 70   • Heart disease Brother         dx at 59 living at 75   • No Known Problems Daughter    • No Known Problems Son    • Heart disease Maternal Grandfather         " MI at 44,  at 78   • Stroke Maternal Grandfather    • Hypertension Brother    • Cancer Brother         dx at 49, living at 66   • Other Brother         HEP. C        Social History     Socioeconomic History   • Marital status:    Tobacco Use   • Smoking status: Former Smoker     Years: 21.00     Types: Cigars, Cigarettes     Quit date:      Years since quittin.1   • Smokeless tobacco: Never Used   Vaping Use   • Vaping Use: Never used   Substance and Sexual Activity   • Alcohol use: No   • Drug use: No   • Sexual activity: Defer         Review of Systems   Constitutional: Positive for fatigue.   HENT: Positive for hearing loss.    Respiratory: Negative for shortness of breath.    Cardiovascular: Negative for chest pain and palpitations.   Genitourinary: Positive for nocturia (1 x nightly). Negative for frequency.        Nocturia   Musculoskeletal: Negative for joint swelling.   Neurological: Positive for seizures, loss of consciousness, memory problem and confusion.       Objective   Visit Vitals  /87 (BP Location: Right arm, Patient Position: Sitting, Cuff Size: Adult)   Pulse 67   Temp 97 °F (36.1 °C) (Temporal)   Resp 18   Ht 177.8 cm (70\")   Wt 97.3 kg (214 lb 6.4 oz)   SpO2 98%   BMI 30.76 kg/m²     Physical Exam  Vitals and nursing note reviewed.   Constitutional:       Appearance: He is well-developed.   HENT:      Head: Normocephalic.   Neck:      Thyroid: No thyromegaly.      Vascular: No carotid bruit.      Trachea: Trachea normal.   Cardiovascular:      Rate and Rhythm: Normal rate and regular rhythm.      Heart sounds: No murmur heard.  No friction rub. No gallop.    Pulmonary:      Effort: Pulmonary effort is normal. No respiratory distress.      Breath sounds: Normal breath sounds. No wheezing.   Chest:      Chest wall: No tenderness.   Musculoskeletal:      Cervical back: Neck supple.   Skin:     General: Skin is dry.      Findings: No rash.      Nails: There is no " clubbing.   Neurological:      Mental Status: He is alert and oriented to person, place, and time.   Psychiatric:         Behavior: Behavior is cooperative.         Assessment/Plan   Problem List Items Addressed This Visit        High    Seizures (HCC) - Primary    Current Assessment & Plan     Doing well.  Patient tolerated Keppra well without side effects. I feel the benefits of the medication outweigh the risks.            Medium    Hyperlipidemia    Current Assessment & Plan     Patient given fasting order for Lipid and CMP         Relevant Orders    Lipid Panel With / Chol / HDL Ratio (Completed)    Comprehensive metabolic panel (Completed)       Unprioritized    Anxiety    Current Assessment & Plan     Doing well off medication         Hearing loss    Current Assessment & Plan     Advised to avoid noise exposure and wear hearing protection.         Lethargy    Current Assessment & Plan     Patient given order for CBC and TSH         Relevant Orders    CBC & Differential (Completed)    TSH (Completed)    Memory loss    Current Assessment & Plan     MMSE done today, read by me, reviewed with pt. MMSE score was 26/30 down from 28/30         Nocturia    Overview     1 x nightly         Current Assessment & Plan     U/A done today, read by me, reviewed with pt.  U/A showed trace protein. Patient declines more work-up or treatment         Relevant Orders    POCT urinalysis dipstick, manual (Completed)    Screening PSA (prostate specific antigen)    Current Assessment & Plan     Patient given order for PSA         Relevant Orders    PSA Screen (Completed)

## 2022-02-17 NOTE — ASSESSMENT & PLAN NOTE
U/A done today, read by me, reviewed with pt.  U/A showed trace protein. Patient declines more work-up or treatment

## 2022-02-17 NOTE — ASSESSMENT & PLAN NOTE
Doing well.  Patient tolerated Keppra well without side effects. I feel the benefits of the medication outweigh the risks.

## 2022-02-18 LAB
ALBUMIN SERPL-MCNC: 4.3 G/DL (ref 3.8–4.8)
ALBUMIN/GLOB SERPL: 1.7 {RATIO} (ref 1.2–2.2)
ALP SERPL-CCNC: 73 IU/L (ref 44–121)
ALT SERPL-CCNC: 14 IU/L (ref 0–44)
AST SERPL-CCNC: 11 IU/L (ref 0–40)
BASOPHILS # BLD AUTO: 0.1 X10E3/UL (ref 0–0.2)
BASOPHILS NFR BLD AUTO: 1 %
BILIRUB SERPL-MCNC: 0.4 MG/DL (ref 0–1.2)
BUN SERPL-MCNC: 23 MG/DL (ref 8–27)
BUN/CREAT SERPL: 25 (ref 10–24)
CALCIUM SERPL-MCNC: 9.2 MG/DL (ref 8.6–10.2)
CHLORIDE SERPL-SCNC: 103 MMOL/L (ref 96–106)
CHOLEST SERPL-MCNC: 159 MG/DL (ref 100–199)
CHOLEST/HDLC SERPL: 4.2 RATIO (ref 0–5)
CO2 SERPL-SCNC: 24 MMOL/L (ref 20–29)
CREAT SERPL-MCNC: 0.93 MG/DL (ref 0.76–1.27)
EOSINOPHIL # BLD AUTO: 0.1 X10E3/UL (ref 0–0.4)
EOSINOPHIL NFR BLD AUTO: 1 %
ERYTHROCYTE [DISTWIDTH] IN BLOOD BY AUTOMATED COUNT: 13.1 % (ref 11.6–15.4)
GLOBULIN SER CALC-MCNC: 2.6 G/DL (ref 1.5–4.5)
GLUCOSE SERPL-MCNC: 88 MG/DL (ref 65–99)
HCT VFR BLD AUTO: 42.8 % (ref 37.5–51)
HDLC SERPL-MCNC: 38 MG/DL
HGB BLD-MCNC: 14.3 G/DL (ref 13–17.7)
IMM GRANULOCYTES # BLD AUTO: 0.1 X10E3/UL (ref 0–0.1)
IMM GRANULOCYTES NFR BLD AUTO: 1 %
LDLC SERPL CALC-MCNC: 109 MG/DL (ref 0–99)
LYMPHOCYTES # BLD AUTO: 1.5 X10E3/UL (ref 0.7–3.1)
LYMPHOCYTES NFR BLD AUTO: 17 %
MCH RBC QN AUTO: 29.1 PG (ref 26.6–33)
MCHC RBC AUTO-ENTMCNC: 33.4 G/DL (ref 31.5–35.7)
MCV RBC AUTO: 87 FL (ref 79–97)
MONOCYTES # BLD AUTO: 0.5 X10E3/UL (ref 0.1–0.9)
MONOCYTES NFR BLD AUTO: 6 %
NEUTROPHILS # BLD AUTO: 6.3 X10E3/UL (ref 1.4–7)
NEUTROPHILS NFR BLD AUTO: 74 %
PLATELET # BLD AUTO: 366 X10E3/UL (ref 150–450)
POTASSIUM SERPL-SCNC: 4.9 MMOL/L (ref 3.5–5.2)
PROT SERPL-MCNC: 6.9 G/DL (ref 6–8.5)
PSA SERPL-MCNC: 1.6 NG/ML (ref 0–4)
RBC # BLD AUTO: 4.91 X10E6/UL (ref 4.14–5.8)
SODIUM SERPL-SCNC: 142 MMOL/L (ref 134–144)
TRIGL SERPL-MCNC: 62 MG/DL (ref 0–149)
TSH SERPL DL<=0.005 MIU/L-ACNC: 1.46 UIU/ML (ref 0.45–4.5)
VLDLC SERPL CALC-MCNC: 12 MG/DL (ref 5–40)
WBC # BLD AUTO: 8.6 X10E3/UL (ref 3.4–10.8)

## 2022-02-23 ENCOUNTER — CLINICAL SUPPORT (OUTPATIENT)
Dept: FAMILY MEDICINE CLINIC | Facility: CLINIC | Age: 62
End: 2022-02-23

## 2022-02-23 ENCOUNTER — OFFICE VISIT (OUTPATIENT)
Dept: FAMILY MEDICINE CLINIC | Facility: CLINIC | Age: 62
End: 2022-02-23

## 2022-02-23 VITALS
BODY MASS INDEX: 30.64 KG/M2 | DIASTOLIC BLOOD PRESSURE: 83 MMHG | HEIGHT: 70 IN | TEMPERATURE: 97.4 F | RESPIRATION RATE: 18 BRPM | HEART RATE: 61 BPM | SYSTOLIC BLOOD PRESSURE: 138 MMHG | OXYGEN SATURATION: 97 % | WEIGHT: 214 LBS

## 2022-02-23 VITALS
OXYGEN SATURATION: 98 % | HEIGHT: 70 IN | HEART RATE: 79 BPM | SYSTOLIC BLOOD PRESSURE: 138 MMHG | BODY MASS INDEX: 30.64 KG/M2 | RESPIRATION RATE: 16 BRPM | DIASTOLIC BLOOD PRESSURE: 83 MMHG | TEMPERATURE: 96.9 F | WEIGHT: 214 LBS

## 2022-02-23 DIAGNOSIS — Z02.4 ENCOUNTER FOR CDL (COMMERCIAL DRIVING LICENSE) EXAM: Primary | ICD-10-CM

## 2022-02-23 DIAGNOSIS — Z00.01 ENCOUNTER FOR GENERAL ADULT MEDICAL EXAMINATION WITH ABNORMAL FINDINGS: ICD-10-CM

## 2022-02-23 DIAGNOSIS — G56.01 CARPAL TUNNEL SYNDROME OF RIGHT WRIST: ICD-10-CM

## 2022-02-23 DIAGNOSIS — R35.1 NOCTURIA: ICD-10-CM

## 2022-02-23 DIAGNOSIS — Z83.3 FAMILY HISTORY OF DIABETES MELLITUS: ICD-10-CM

## 2022-02-23 DIAGNOSIS — M54.50 CHRONIC BILATERAL LOW BACK PAIN WITHOUT SCIATICA: ICD-10-CM

## 2022-02-23 DIAGNOSIS — E78.2 MIXED HYPERLIPIDEMIA: ICD-10-CM

## 2022-02-23 DIAGNOSIS — F41.9 ANXIETY: ICD-10-CM

## 2022-02-23 DIAGNOSIS — Z87.891 HISTORY OF TOBACCO USE: ICD-10-CM

## 2022-02-23 DIAGNOSIS — M67.40 GANGLION CYST: ICD-10-CM

## 2022-02-23 DIAGNOSIS — S06.5XAA SUBDURAL HEMATOMA: Primary | ICD-10-CM

## 2022-02-23 DIAGNOSIS — Z82.49 FAMILY HISTORY OF CORONARY ARTERY DISEASE: ICD-10-CM

## 2022-02-23 DIAGNOSIS — R56.9 SEIZURES: ICD-10-CM

## 2022-02-23 DIAGNOSIS — Z23 NEED FOR SHINGLES VACCINE: ICD-10-CM

## 2022-02-23 DIAGNOSIS — E66.09 CLASS 1 OBESITY DUE TO EXCESS CALORIES WITH SERIOUS COMORBIDITY AND BODY MASS INDEX (BMI) OF 30.0 TO 30.9 IN ADULT: ICD-10-CM

## 2022-02-23 DIAGNOSIS — H90.2 CONDUCTIVE HEARING LOSS, UNSPECIFIED LATERALITY: ICD-10-CM

## 2022-02-23 DIAGNOSIS — R41.3 MEMORY LOSS: ICD-10-CM

## 2022-02-23 DIAGNOSIS — G47.33 OBSTRUCTIVE SLEEP APNEA SYNDROME: ICD-10-CM

## 2022-02-23 DIAGNOSIS — R55 SYNCOPE, UNSPECIFIED SYNCOPE TYPE: ICD-10-CM

## 2022-02-23 DIAGNOSIS — J34.2 DEVIATED SEPTUM: ICD-10-CM

## 2022-02-23 DIAGNOSIS — K63.5 HYPERPLASTIC COLONIC POLYP, UNSPECIFIED PART OF COLON: ICD-10-CM

## 2022-02-23 DIAGNOSIS — F05 CONFUSION AFTER A SEIZURE: ICD-10-CM

## 2022-02-23 DIAGNOSIS — R53.83 LETHARGY: ICD-10-CM

## 2022-02-23 DIAGNOSIS — G89.29 CHRONIC BILATERAL LOW BACK PAIN WITHOUT SCIATICA: ICD-10-CM

## 2022-02-23 DIAGNOSIS — R20.2 PARESTHESIA: ICD-10-CM

## 2022-02-23 DIAGNOSIS — Z12.5 SCREENING PSA (PROSTATE SPECIFIC ANTIGEN): ICD-10-CM

## 2022-02-23 DIAGNOSIS — M50.90 CERVICAL DISC DISEASE: ICD-10-CM

## 2022-02-23 DIAGNOSIS — S12.591A OTHER CLOSED NONDISPLACED FRACTURE OF SIXTH CERVICAL VERTEBRA, INITIAL ENCOUNTER: ICD-10-CM

## 2022-02-23 LAB
BILIRUB BLD-MCNC: NEGATIVE MG/DL
CLARITY, POC: CLEAR
COLOR UR: YELLOW
GLUCOSE UR STRIP-MCNC: NEGATIVE MG/DL
KETONES UR QL: NEGATIVE
LEUKOCYTE EST, POC: NEGATIVE
NITRITE UR-MCNC: NEGATIVE MG/ML
PH UR: 6 [PH] (ref 5–8)
PROT UR STRIP-MCNC: NEGATIVE MG/DL
RBC # UR STRIP: NEGATIVE /UL
SP GR UR: 1.01 (ref 1–1.03)
UROBILINOGEN UR QL: NORMAL

## 2022-02-23 PROCEDURE — 99214 OFFICE O/P EST MOD 30 MIN: CPT | Performed by: FAMILY MEDICINE

## 2022-02-23 PROCEDURE — DOTPHY: Performed by: FAMILY MEDICINE

## 2022-02-23 PROCEDURE — 99396 PREV VISIT EST AGE 40-64: CPT | Performed by: FAMILY MEDICINE

## 2022-02-23 PROCEDURE — 81002 URINALYSIS NONAUTO W/O SCOPE: CPT | Performed by: FAMILY MEDICINE

## 2022-02-23 PROCEDURE — 90471 IMMUNIZATION ADMIN: CPT | Performed by: FAMILY MEDICINE

## 2022-02-23 PROCEDURE — 90750 HZV VACC RECOMBINANT IM: CPT | Performed by: FAMILY MEDICINE

## 2022-02-23 RX ORDER — LEVETIRACETAM 500 MG/1
500 TABLET ORAL 3 TIMES DAILY
Qty: 270 TABLET | Refills: 3
Start: 2022-02-23 | End: 2022-05-31 | Stop reason: SDUPTHER

## 2022-02-23 RX ORDER — CHLORAL HYDRATE 500 MG
1000 CAPSULE ORAL
Qty: 90 CAPSULE | Refills: 12
Start: 2022-02-23 | End: 2023-02-28 | Stop reason: SDUPTHER

## 2022-02-23 RX ORDER — VITAMIN E 268 MG
400 CAPSULE ORAL DAILY
Qty: 60 CAPSULE | Refills: 5
Start: 2022-02-23

## 2022-02-23 RX ORDER — MULTIVIT WITH MINERALS/LUTEIN
250 TABLET ORAL DAILY
Qty: 30 TABLET | Refills: 12
Start: 2022-02-23

## 2022-02-23 NOTE — PROGRESS NOTES
Medical Examination      Subjective     Mook Bose is a 61 y.o. male who presents today for a  fitness determination physical exam. The patient reports no problems.  The following portions of the patient's history were reviewed and updated as appropriate: allergies, current medications, past family history, past medical history, past social history, past surgical history and problem list.    I personally reviewed and updated the patient's allergies, medications, problem list, and past medical, surgical, social, and family history. I have reviewed and confirmed the accuracy of the History of Present Illness and Review of Symptoms as documented by the MA/LPN/RN. Lonny Martinez MD        Family History   Problem Relation Age of Onset   • Alzheimer's disease Mother 62   • Cancer Father         Unknown   • Alzheimer's disease Sister 70   • Heart disease Brother         dx at 59 living at 75   • No Known Problems Daughter    • No Known Problems Son    • Heart disease Maternal Grandfather         MI at 44,  at 78   • Stroke Maternal Grandfather    • Hypertension Brother    • Cancer Brother         dx at 49, living at 66   • Other Brother         HEP. C       Social History     Tobacco Use   • Smoking status: Former Smoker     Years: 21.00     Types: Cigars, Cigarettes     Quit date:      Years since quittin.1   • Smokeless tobacco: Never Used   Vaping Use   • Vaping Use: Never used   Substance Use Topics   • Alcohol use: No   • Drug use: No       Past Surgical History:   Procedure Laterality Date   • CRANIOTOMY  2009   • VASECTOMY         Patient Active Problem List   Diagnosis   • Seizures (HCC)   • Memory loss   • Closed nondisplaced fracture of sixth cervical vertebra (HCC)   • Subdural hematoma (HCC)   • Class 1 obesity due to excess calories with serious comorbidity and body mass index (BMI) of 30.0 to 30.9 in adult   • History of tobacco use   •  "Hearing loss   • Lethargy   • Screening PSA (prostate specific antigen)   • Hyperlipidemia   • Encounter for general adult medical examination with abnormal findings   • Deviated septum   • Carpal tunnel syndrome of right wrist   • Cervical disc disease   • Hyperplastic colon polyp   • Chronic bilateral low back pain without sciatica   • Ganglion cyst   • Family history of coronary artery disease   • Family history of diabetes mellitus   • Obstructive sleep apnea syndrome   • Encounter for CDL (commercial driving license) exam         Current Outpatient Medications:   •  aspirin 81 MG tablet, Take 81 mg by mouth Daily., Disp: , Rfl:   •  levETIRAcetam (KEPPRA) 500 MG tablet, Take 1 tablet by mouth 3 (Three) Times a Day., Disp: 270 tablet, Rfl: 3  •  Omega-3 Fatty Acids (fish oil) 1000 MG capsule capsule, Take 1 capsule by mouth Daily With Breakfast., Disp: 90 capsule, Rfl: 12  •  vitamin C (ASCORBIC ACID) 250 MG tablet, Take 1 tablet by mouth Daily., Disp: 30 tablet, Rfl: 12  •  vitamin E 400 UNIT capsule, Take 1 capsule by mouth Daily., Disp: 60 capsule, Rfl: 5         Review of Systems   Constitutional: Negative for chills and diaphoresis.   Eyes: Negative for visual disturbance.   Respiratory: Negative for shortness of breath.    Cardiovascular: Negative for chest pain and palpitations.   Gastrointestinal: Negative for abdominal pain and nausea.   Endocrine: Negative for polydipsia and polyphagia.   Musculoskeletal: Negative for neck stiffness.   Skin: Negative for color change and pallor.   Neurological: Negative for seizures and syncope.   Hematological: Negative for adenopathy.       I have reviewed and confirmed the accuracy of the ROS as documented by the MA/LPN/RN Lonny Martinez MD      Objective   /83   Pulse 79   Temp 96.9 °F (36.1 °C)   Resp 16   Ht 177.8 cm (70\")   Wt 97.1 kg (214 lb)   SpO2 98%   BMI 30.71 kg/m²   Wt Readings from Last 3 Encounters:   02/23/22 97.1 kg (214 lb)   02/23/22 " 97.1 kg (214 lb)   02/17/22 97.3 kg (214 lb 6.4 oz)       Vision:   Uncorrected Corrected Horizontal Field of Vision   Right Eye . 20/20 95 degrees   Left Eye  . 20/20 95 degrees   Both Eyes  . 20/20      Applicant can recognize and distinguish among traffic control signals and devices showing standard red, green, and talya colors.    Applicant meets visual acuity requirement only when wearing corrective lenses.    Monocular Vision?: No    Physical Exam  Constitutional:       Appearance: Normal appearance. He is well-developed. He is not diaphoretic.   HENT:      Right Ear: Hearing, tympanic membrane, ear canal and external ear normal.      Left Ear: Hearing, tympanic membrane, ear canal and external ear normal.      Nose: Nose normal. No mucosal edema or congestion.      Right Sinus: No maxillary sinus tenderness or frontal sinus tenderness.      Left Sinus: No maxillary sinus tenderness or frontal sinus tenderness.      Mouth/Throat:      Mouth: No oral lesions.      Pharynx: Uvula midline. No oropharyngeal exudate or posterior oropharyngeal erythema.      Tonsils: No tonsillar exudate.   Eyes:      General: Lids are normal. No scleral icterus.        Right eye: No foreign body or discharge.         Left eye: No foreign body or discharge.      Extraocular Movements:      Right eye: No nystagmus.      Left eye: No nystagmus.      Conjunctiva/sclera: Conjunctivae normal.      Right eye: Right conjunctiva is not injected. No exudate or hemorrhage.     Left eye: Left conjunctiva is not injected. No exudate or hemorrhage.     Pupils: Pupils are equal, round, and reactive to light.      Funduscopic exam:     Right eye: No hemorrhage, exudate, AV nicking, arteriolar narrowing or papilledema.         Left eye: No hemorrhage, exudate, AV nicking, arteriolar narrowing or papilledema.   Cardiovascular:      Rate and Rhythm: Normal rate and regular rhythm.      Pulses: Normal pulses.      Heart sounds: Normal heart sounds, S1  normal and S2 normal. No murmur heard.  No friction rub. No gallop.    Pulmonary:      Effort: Pulmonary effort is normal. No accessory muscle usage.      Breath sounds: Normal breath sounds. No stridor. No decreased breath sounds, wheezing, rhonchi or rales.   Abdominal:      General: Bowel sounds are normal. There is no distension.      Palpations: Abdomen is soft. Abdomen is not rigid. There is no mass or pulsatile mass.      Tenderness: There is no abdominal tenderness. There is no guarding or rebound. Negative signs include Dinh's sign.      Hernia: No hernia is present.   Skin:     General: Skin is warm and dry.      Coloration: Skin is not pale.   Neurological:      Mental Status: He is alert and oriented to person, place, and time.      Cranial Nerves: No cranial nerve deficit.      Sensory: No sensory deficit.      Motor: No tremor, abnormal muscle tone or seizure activity.      Coordination: Coordination normal.      Gait: Gait normal.      Deep Tendon Reflexes: Reflexes are normal and symmetric.           Assessment/Plan      Medications        Problem List         LOS    CD.  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  Remote history of traumatic brain injury with seizure disorder, no seizures since 2013, has been stable on Keppra, has been cleared to drive by neurology/has letter from neurology.        Diagnoses and all orders for this visit:    1. 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 Class 1 obesity due to excess calories with serious comorbidity and body mass index (BMI) of 30.0 to 30.9 in adult    3. History of tobacco use              Expected course, medications, and adverse effects discussed.  Call or return if worsening or persistent symptoms.  I wore protective equipment throughout this patient encounter including a mask, gloves, and eye protection.  Hand hygiene was performed before donning protective equipment and after  removal when leaving the room.  The complete contents of the assessment and plan and lab results as documented above have been reviewed and addressed by myself with the patient today as part of an ongoing evaluation / treatment plan.  If some of the documentation has been copied from a previous note and is unchanged it indicates that this problem / plan has been assessed today but is stable from a previous visit and no changes have been recommended.

## 2022-03-07 ENCOUNTER — TELEPHONE (OUTPATIENT)
Dept: FAMILY MEDICINE CLINIC | Facility: CLINIC | Age: 62
End: 2022-03-07

## 2022-05-31 ENCOUNTER — TELEPHONE (OUTPATIENT)
Dept: FAMILY MEDICINE CLINIC | Facility: CLINIC | Age: 62
End: 2022-05-31

## 2022-05-31 DIAGNOSIS — R56.9 SEIZURES: ICD-10-CM

## 2022-05-31 RX ORDER — LEVETIRACETAM 500 MG/1
500 TABLET ORAL 3 TIMES DAILY
Qty: 270 TABLET | Refills: 3
Start: 2022-05-31 | End: 2022-05-31 | Stop reason: SDUPTHER

## 2022-05-31 RX ORDER — LEVETIRACETAM 500 MG/1
TABLET ORAL
Qty: 270 TABLET | Refills: 5 | Status: SHIPPED | OUTPATIENT
Start: 2022-05-31 | End: 2023-02-28 | Stop reason: SDUPTHER

## 2022-10-20 NOTE — PROGRESS NOTES
"Chief Complaint  Follow-up (TAMMY AND SEIZURES )    Subjective          Mook Bose presents to [facility] NEUROLOGY  History of Present Illness  F/u on TAMMY, patient states he is benefiting from pap therapy, he uses nasal mask and get supplies from [place].    Seizures- no recent seizures patient currently taking keppra 500 mg 1 tid    Sleep testing history:    On NPSG at [facility] , 6/28/2021 patient had  obstructive sleep apnea syndrome with apnea-hypopnea index of 24.9 per sleep hour, minimum SpO2 of 72%    PAP download:  The patient is on CPAP therapy at 7-16 cm/H2O.   Data indicates Excellent compliance.  average usage of 6 hours 29 minutes. AHI down to 3.3 .  Average pressures 8.      The patient's hypersomnia has resolved       [place] Sleepiness Scale:  Sitting and reading 0 WatchingTV 1  Sitting, inactive, in a public place 0  As a passenger in a car for 1 hour w/o a break  0  Lying down to rest in the afternoon  1  Sitting and talking to someone  0  Sitting quietly after a lunch  1  In a car, while stopped for traffic or a light  0  Total 3    Review of Systems   Respiratory: Positive for apnea.    All other systems reviewed and are negative.        Objective   Vital Signs:   /76   Pulse 81   Temp 98.4 °F (36.9 °C) (Infrared)   Resp 18   Ht 177.8 cm (70\")   Wt 92.5 kg (204 lb)   BMI 29.27 kg/m²     Physical Exam  Vitals reviewed.   HENT:      Nose: Nose normal.   Eyes:      Pupils: Pupils are equal, round, and reactive to light.   Cardiovascular:      Pulses: Normal pulses.   Pulmonary:      Effort: Pulmonary effort is normal.   Abdominal:      General: Abdomen is flat.   Musculoskeletal:         General: Normal range of motion.      Cervical back: Normal range of motion.   Skin:     General: Skin is warm.   Neurological:      General: No focal deficit present.      Mental Status: He is alert.   Psychiatric:         Mood and Affect: Mood normal.        Result Review :            "      Assessment and Plan    Diagnoses and all orders for this visit:    1. Obstructive sleep apnea syndrome (Primary)      Continue CPAP at 7-16  The patient is compliant with and benefiting from PAP therapy.      Follow Up   Return in about 1 year (around 10/24/2023).    Patient was given instructions and counseling regarding his condition or for health maintenance advice. Please see specific information pulled into the AVS if appropriate.       This document has been electronically signed by Joseph Seipel, MD on October 24, 2022 14:44 EDT

## 2022-10-24 ENCOUNTER — OFFICE VISIT (OUTPATIENT)
Dept: NEUROLOGY | Facility: CLINIC | Age: 62
End: 2022-10-24

## 2022-10-24 VITALS
HEART RATE: 81 BPM | HEIGHT: 70 IN | SYSTOLIC BLOOD PRESSURE: 129 MMHG | BODY MASS INDEX: 29.2 KG/M2 | RESPIRATION RATE: 18 BRPM | TEMPERATURE: 98.4 F | DIASTOLIC BLOOD PRESSURE: 76 MMHG | WEIGHT: 204 LBS

## 2022-10-24 DIAGNOSIS — G47.33 OBSTRUCTIVE SLEEP APNEA SYNDROME: Primary | ICD-10-CM

## 2022-10-24 PROCEDURE — 99213 OFFICE O/P EST LOW 20 MIN: CPT | Performed by: PSYCHIATRY & NEUROLOGY

## 2023-01-31 ENCOUNTER — CLINICAL SUPPORT (OUTPATIENT)
Dept: FAMILY MEDICINE CLINIC | Facility: CLINIC | Age: 63
End: 2023-01-31

## 2023-01-31 VITALS
OXYGEN SATURATION: 98 % | RESPIRATION RATE: 18 BRPM | TEMPERATURE: 97.7 F | HEART RATE: 59 BPM | HEIGHT: 70 IN | BODY MASS INDEX: 30.09 KG/M2 | WEIGHT: 210.2 LBS | DIASTOLIC BLOOD PRESSURE: 82 MMHG | SYSTOLIC BLOOD PRESSURE: 126 MMHG

## 2023-01-31 DIAGNOSIS — Z02.4 ENCOUNTER FOR CDL (COMMERCIAL DRIVING LICENSE) EXAM: Primary | ICD-10-CM

## 2023-01-31 LAB
BILIRUB BLD-MCNC: NEGATIVE MG/DL
CLARITY, POC: CLEAR
COLOR UR: YELLOW
EXPIRATION DATE: ABNORMAL
GLUCOSE UR STRIP-MCNC: NEGATIVE MG/DL
KETONES UR QL: NEGATIVE
LEUKOCYTE EST, POC: NEGATIVE
Lab: ABNORMAL
NITRITE UR-MCNC: NEGATIVE MG/ML
PH UR: 6 [PH] (ref 5–8)
PROT UR STRIP-MCNC: ABNORMAL MG/DL
RBC # UR STRIP: NEGATIVE /UL
SP GR UR: 1.01 (ref 1–1.03)
UROBILINOGEN UR QL: NORMAL

## 2023-01-31 PROCEDURE — 81003 URINALYSIS AUTO W/O SCOPE: CPT | Performed by: FAMILY MEDICINE

## 2023-01-31 PROCEDURE — DOTPHY: Performed by: FAMILY MEDICINE

## 2023-01-31 NOTE — PROGRESS NOTES
Medical Examination    Subjective   Mook Bose is a 62 y.o. male who presents today for a  fitness determination physical exam. The patient reports no problems.  The following portions of the patient's history were reviewed and updated as appropriate: allergies, current medications, past family history, past medical history, past social history, past surgical history and problem list.  Review of Systems  A comprehensive review of systems was negative.    Objective    Vision:  Vision Screening    Right eye Left eye Both eyes   Without correction 20/25 20/25 20/25   With correction          Applicant can recognize and distinguish among traffic control signals and devices showing standard red, green, and talya colors.  Applicant has peripheral vision to 90 degrees in each eye.         Monocular Vision?: No      Hearing:  Applicant can distinguish forced whisper at a distance of 5 feet with both ears.         Physical Exam  Constitutional:       Appearance: He is well-developed.   HENT:      Head: Normocephalic and atraumatic.      Right Ear: External ear normal.      Left Ear: External ear normal.      Nose: Nose normal.   Eyes:      Pupils: Pupils are equal, round, and reactive to light.   Cardiovascular:      Rate and Rhythm: Normal rate and regular rhythm.      Heart sounds: Normal heart sounds.   Pulmonary:      Effort: Pulmonary effort is normal.      Breath sounds: Normal breath sounds.   Abdominal:      General: Bowel sounds are normal.      Palpations: Abdomen is soft.   Musculoskeletal:         General: Normal range of motion.      Cervical back: Normal range of motion and neck supple.   Skin:     General: Skin is warm and dry.   Neurological:      Mental Status: He is alert and oriented to person, place, and time.   Psychiatric:         Behavior: Behavior normal.         Thought Content: Thought content normal.         Judgment: Judgment normal.          Labs:  Lab  Results   Component Value Date    SPECGRAV 1.015 01/31/2023    BILIRUBINUR Negative 01/31/2023    GLUCOSEU Negative 03/06/2019    GLUCOSEU 95 03/06/2019       Assessment & Plan   Healthy male exam.   Meets standards, but periodic monitoring required due to h/o seizure.   qualified only for 1 year.     Medical examiners certificate completed and printed.  Return as needed.

## 2023-02-22 ENCOUNTER — OFFICE VISIT (OUTPATIENT)
Dept: FAMILY MEDICINE CLINIC | Facility: CLINIC | Age: 63
End: 2023-02-22
Payer: COMMERCIAL

## 2023-02-22 VITALS
TEMPERATURE: 97.5 F | RESPIRATION RATE: 18 BRPM | HEIGHT: 71 IN | SYSTOLIC BLOOD PRESSURE: 130 MMHG | DIASTOLIC BLOOD PRESSURE: 74 MMHG | BODY MASS INDEX: 29.15 KG/M2 | HEART RATE: 63 BPM | OXYGEN SATURATION: 97 % | WEIGHT: 208.2 LBS

## 2023-02-22 DIAGNOSIS — R53.83 LETHARGY: ICD-10-CM

## 2023-02-22 DIAGNOSIS — M25.531 RIGHT WRIST PAIN: ICD-10-CM

## 2023-02-22 DIAGNOSIS — R41.3 MEMORY LOSS: ICD-10-CM

## 2023-02-22 DIAGNOSIS — Z12.5 SCREENING PSA (PROSTATE SPECIFIC ANTIGEN): ICD-10-CM

## 2023-02-22 DIAGNOSIS — R56.9 SEIZURES: Primary | ICD-10-CM

## 2023-02-22 DIAGNOSIS — E78.2 MIXED HYPERLIPIDEMIA: ICD-10-CM

## 2023-02-22 DIAGNOSIS — F32.A DEPRESSION, UNSPECIFIED DEPRESSION TYPE: ICD-10-CM

## 2023-02-22 PROCEDURE — 99214 OFFICE O/P EST MOD 30 MIN: CPT | Performed by: FAMILY MEDICINE

## 2023-02-22 RX ORDER — PAROXETINE 10 MG/1
10 TABLET, FILM COATED ORAL EVERY MORNING
Qty: 30 TABLET | Refills: 12 | Status: SHIPPED | OUTPATIENT
Start: 2023-02-22 | End: 2023-02-28 | Stop reason: SDUPTHER

## 2023-02-22 NOTE — PROGRESS NOTES
Subjective   Mook Bose is a 62 y.o. male.     Depression  Visit Type: initial  Onset of symptoms: 1 to 6 months ago  Progression since onset: unchanged  Patient presents with the following symptoms: confusion, depressed mood, irritability and malaise.  Patient is not experiencing: palpitations and shortness of breath.  Frequency of symptoms: most days   Severity: mild   Aggravated by: work stress  Sleep quality: fair  Nighttime awakenings: one to two    Seizures   This is a chronic problem. The current episode started more than 1 week ago. The problem has not changed since onset.Associated symptoms include sleepiness and confusion. Pertinent negatives include no chest pain. Characteristics include loss of consciousness. The episode was witnessed. There has been no fever.   Wrist Pain   The pain is present in the right wrist. This is a chronic problem. The current episode started more than 1 month ago. The problem occurs intermittently. The problem has been unchanged. The pain is at a severity of 4/10. He has tried nothing for the symptoms. The treatment provided no relief.        The following portions of the patient's history were reviewed and updated as appropriate: allergies, current medications, past family history, past medical history, past social history, past surgical history and problem list.    Family History   Problem Relation Age of Onset   • Alzheimer's disease Mother 62   • Cancer Father         Unknown   • Alzheimer's disease Sister 70   • Heart disease Brother         dx at 59 living at 75   • No Known Problems Daughter    • No Known Problems Son    • Heart disease Maternal Grandfather         MI at 44,  at 78   • Stroke Maternal Grandfather    • Hypertension Brother    • Cancer Brother         dx at 49, living at 66   • Other Brother         HEP. C       Social History     Tobacco Use   • Smoking status: Former     Packs/day: 1.00     Years: 26.00     Pack years: 26.00     Types:  Cigars, Cigarettes     Start date: 1974     Quit date:      Years since quittin.1   • Smokeless tobacco: Never   Vaping Use   • Vaping Use: Never used   Substance Use Topics   • Alcohol use: No   • Drug use: No       Past Surgical History:   Procedure Laterality Date   • CRANIOTOMY  2009   • VASECTOMY  1992       Patient Active Problem List   Diagnosis   • Seizures (HCC)   • Memory loss   • Closed nondisplaced fracture of sixth cervical vertebra (HCC)   • Subdural hematoma   • Class 1 obesity due to excess calories with serious comorbidity and body mass index (BMI) of 30.0 to 30.9 in adult   • History of tobacco use   • Hearing loss   • Lethargy   • Screening PSA (prostate specific antigen)   • Hyperlipidemia   • Encounter for general adult medical examination with abnormal findings   • Deviated septum   • Carpal tunnel syndrome of right wrist   • Cervical disc disease   • Hyperplastic colon polyp   • Chronic bilateral low back pain without sciatica   • Ganglion cyst   • Family history of coronary artery disease   • Family history of diabetes mellitus   • Obstructive sleep apnea syndrome   • Encounter for CDL (commercial driving license) exam   • Right wrist pain   • Depression       Current Outpatient Medications on File Prior to Visit   Medication Sig Dispense Refill   • aspirin 81 MG tablet Take 81 mg by mouth Daily.     • levETIRAcetam (KEPPRA) 500 MG tablet TAKE 1 TABLET BY MOUTH THREE TIMES DAILY 270 tablet 5   • Omega-3 Fatty Acids (fish oil) 1000 MG capsule capsule Take 1 capsule by mouth Daily With Breakfast. 90 capsule 12   • vitamin C (ASCORBIC ACID) 250 MG tablet Take 1 tablet by mouth Daily. 30 tablet 12   • vitamin E 400 UNIT capsule Take 1 capsule by mouth Daily. 60 capsule 5     No current facility-administered medications on file prior to visit.       Allergies   Allergen Reactions   • Penicillins Hives       Review of Systems   Constitutional: Positive for fatigue and  "irritability.   HENT: Negative for hearing loss.    Respiratory: Negative for shortness of breath.    Cardiovascular: Negative for chest pain and palpitations.   Genitourinary: Positive for nocturia (1-3 nightly). Negative for frequency.        Nocturia   Musculoskeletal: Positive for arthralgias. Negative for joint swelling.   Neurological: Positive for seizures, loss of consciousness and confusion. Negative for memory problem.   Psychiatric/Behavioral: Positive for depressed mood.       Objective   Visit Vitals  /74 (BP Location: Left arm, Patient Position: Sitting, Cuff Size: Adult)   Pulse 63   Temp 97.5 °F (36.4 °C) (Temporal)   Resp 18   Ht 179.1 cm (70.5\")   Wt 94.4 kg (208 lb 3.2 oz)   SpO2 97%   BMI 29.45 kg/m²     Physical Exam  Vitals and nursing note reviewed.   Constitutional:       Appearance: He is well-developed.   HENT:      Head: Normocephalic.   Neck:      Thyroid: No thyromegaly.      Vascular: No carotid bruit.      Trachea: Trachea normal.   Cardiovascular:      Rate and Rhythm: Normal rate and regular rhythm.      Heart sounds: No murmur heard.    No friction rub. No gallop.   Pulmonary:      Effort: Pulmonary effort is normal. No respiratory distress.      Breath sounds: Normal breath sounds. No wheezing.   Chest:      Chest wall: No tenderness.   Musculoskeletal:      Right wrist: Tenderness present. Decreased range of motion.      Cervical back: Neck supple.   Skin:     General: Skin is dry.      Findings: No rash.      Nails: There is no clubbing.   Neurological:      General: No focal deficit present.      Mental Status: He is alert and oriented to person, place, and time.      Cranial Nerves: Cranial nerves 2-12 are intact.   Psychiatric:         Behavior: Behavior is cooperative.           Assessment & Plan .  Problem List Items Addressed This Visit        High    Seizures (HCC) - Primary    Overview     Approx 2010 or 2013         Current Assessment & Plan     Doing well.  " Patient tolerated Keppra well without side effects. I feel the benefits of the medication outweigh the risks.            Medium    Hyperlipidemia    Current Assessment & Plan     Will order labs today and patient will return for results and shared decision making.           Relevant Orders    Lipid Panel With / Chol / HDL Ratio (Completed)    Comprehensive metabolic panel (Completed)       Unprioritized    Depression    Current Assessment & Plan     New dx.  Start Paxil 10 mg daily.  Benefits and risk discussed I feel benefits outweigh the risk         Relevant Medications    PARoxetine (PAXIL) 10 MG tablet    Lethargy    Current Assessment & Plan     Will order labs today and patient will return for results and shared decision making.           Relevant Orders    CBC & Differential (Completed)    TSH (Completed)    Memory loss    Current Assessment & Plan     MMSE done today, read by me, reviewed with pt.  MMSE score 3/5--he will discuss this with his neurologist.         Right wrist pain    Current Assessment & Plan     New dx.  Pt. Declines Xray at present.  Advised to try a wrist splint.         Screening PSA (prostate specific antigen)    Current Assessment & Plan     Will order labs today and patient will return for results and shared decision making.           Relevant Orders    PSA SCREENING (Completed)

## 2023-02-22 NOTE — ASSESSMENT & PLAN NOTE
MMSE done today, read by me, reviewed with pt.  MMSE score 3/5--he will discuss this with his neurologist.

## 2023-02-23 LAB
ALBUMIN SERPL-MCNC: 4.4 G/DL (ref 3.8–4.8)
ALBUMIN/GLOB SERPL: 1.6 {RATIO} (ref 1.2–2.2)
ALP SERPL-CCNC: 79 IU/L (ref 44–121)
ALT SERPL-CCNC: 14 IU/L (ref 0–44)
AST SERPL-CCNC: 16 IU/L (ref 0–40)
BASOPHILS # BLD AUTO: 0 X10E3/UL (ref 0–0.2)
BASOPHILS NFR BLD AUTO: 1 %
BILIRUB SERPL-MCNC: 0.3 MG/DL (ref 0–1.2)
BUN SERPL-MCNC: 15 MG/DL (ref 8–27)
BUN/CREAT SERPL: 17 (ref 10–24)
CALCIUM SERPL-MCNC: 9.5 MG/DL (ref 8.6–10.2)
CHLORIDE SERPL-SCNC: 102 MMOL/L (ref 96–106)
CHOLEST SERPL-MCNC: 163 MG/DL (ref 100–199)
CHOLEST/HDLC SERPL: 4.3 RATIO (ref 0–5)
CO2 SERPL-SCNC: 25 MMOL/L (ref 20–29)
CREAT SERPL-MCNC: 0.87 MG/DL (ref 0.76–1.27)
EGFRCR SERPLBLD CKD-EPI 2021: 97 ML/MIN/1.73
EOSINOPHIL # BLD AUTO: 0.1 X10E3/UL (ref 0–0.4)
EOSINOPHIL NFR BLD AUTO: 1 %
ERYTHROCYTE [DISTWIDTH] IN BLOOD BY AUTOMATED COUNT: 13 % (ref 11.6–15.4)
GLOBULIN SER CALC-MCNC: 2.7 G/DL (ref 1.5–4.5)
GLUCOSE SERPL-MCNC: 85 MG/DL (ref 70–99)
HCT VFR BLD AUTO: 45.6 % (ref 37.5–51)
HDLC SERPL-MCNC: 38 MG/DL
HGB BLD-MCNC: 15 G/DL (ref 13–17.7)
IMM GRANULOCYTES # BLD AUTO: 0 X10E3/UL (ref 0–0.1)
IMM GRANULOCYTES NFR BLD AUTO: 0 %
LDLC SERPL CALC-MCNC: 114 MG/DL (ref 0–99)
LYMPHOCYTES # BLD AUTO: 1.5 X10E3/UL (ref 0.7–3.1)
LYMPHOCYTES NFR BLD AUTO: 17 %
MCH RBC QN AUTO: 29 PG (ref 26.6–33)
MCHC RBC AUTO-ENTMCNC: 32.9 G/DL (ref 31.5–35.7)
MCV RBC AUTO: 88 FL (ref 79–97)
MONOCYTES # BLD AUTO: 0.5 X10E3/UL (ref 0.1–0.9)
MONOCYTES NFR BLD AUTO: 6 %
NEUTROPHILS # BLD AUTO: 6.6 X10E3/UL (ref 1.4–7)
NEUTROPHILS NFR BLD AUTO: 75 %
PLATELET # BLD AUTO: 404 X10E3/UL (ref 150–450)
POTASSIUM SERPL-SCNC: 4.8 MMOL/L (ref 3.5–5.2)
PROT SERPL-MCNC: 7.1 G/DL (ref 6–8.5)
PSA SERPL-MCNC: 2.1 NG/ML (ref 0–4)
RBC # BLD AUTO: 5.18 X10E6/UL (ref 4.14–5.8)
SODIUM SERPL-SCNC: 142 MMOL/L (ref 134–144)
TRIGL SERPL-MCNC: 57 MG/DL (ref 0–149)
TSH SERPL DL<=0.005 MIU/L-ACNC: 1.74 UIU/ML (ref 0.45–4.5)
VLDLC SERPL CALC-MCNC: 11 MG/DL (ref 5–40)
WBC # BLD AUTO: 8.7 X10E3/UL (ref 3.4–10.8)

## 2023-02-28 ENCOUNTER — OFFICE VISIT (OUTPATIENT)
Dept: FAMILY MEDICINE CLINIC | Facility: CLINIC | Age: 63
End: 2023-02-28
Payer: COMMERCIAL

## 2023-02-28 VITALS
TEMPERATURE: 97.3 F | HEIGHT: 71 IN | OXYGEN SATURATION: 97 % | RESPIRATION RATE: 18 BRPM | SYSTOLIC BLOOD PRESSURE: 124 MMHG | BODY MASS INDEX: 29.6 KG/M2 | HEART RATE: 69 BPM | WEIGHT: 211.4 LBS | DIASTOLIC BLOOD PRESSURE: 80 MMHG

## 2023-02-28 DIAGNOSIS — Z02.4 ENCOUNTER FOR CDL (COMMERCIAL DRIVING LICENSE) EXAM: ICD-10-CM

## 2023-02-28 DIAGNOSIS — R56.9 SEIZURES: ICD-10-CM

## 2023-02-28 DIAGNOSIS — Z82.49 FAMILY HISTORY OF CORONARY ARTERY DISEASE: ICD-10-CM

## 2023-02-28 DIAGNOSIS — M67.40 GANGLION CYST: ICD-10-CM

## 2023-02-28 DIAGNOSIS — S12.591A OTHER CLOSED NONDISPLACED FRACTURE OF SIXTH CERVICAL VERTEBRA, INITIAL ENCOUNTER: ICD-10-CM

## 2023-02-28 DIAGNOSIS — S06.5XAA SUBDURAL HEMATOMA: ICD-10-CM

## 2023-02-28 DIAGNOSIS — M50.90 CERVICAL DISC DISEASE: ICD-10-CM

## 2023-02-28 DIAGNOSIS — G89.29 CHRONIC BILATERAL LOW BACK PAIN WITHOUT SCIATICA: ICD-10-CM

## 2023-02-28 DIAGNOSIS — J34.2 DEVIATED SEPTUM: ICD-10-CM

## 2023-02-28 DIAGNOSIS — G56.01 CARPAL TUNNEL SYNDROME OF RIGHT WRIST: ICD-10-CM

## 2023-02-28 DIAGNOSIS — F32.A DEPRESSION, UNSPECIFIED DEPRESSION TYPE: ICD-10-CM

## 2023-02-28 DIAGNOSIS — H90.2 CONDUCTIVE HEARING LOSS, UNSPECIFIED LATERALITY: ICD-10-CM

## 2023-02-28 DIAGNOSIS — M25.531 RIGHT WRIST PAIN: ICD-10-CM

## 2023-02-28 DIAGNOSIS — G47.33 OBSTRUCTIVE SLEEP APNEA SYNDROME: ICD-10-CM

## 2023-02-28 DIAGNOSIS — M54.50 CHRONIC BILATERAL LOW BACK PAIN WITHOUT SCIATICA: ICD-10-CM

## 2023-02-28 DIAGNOSIS — R41.3 MEMORY LOSS: ICD-10-CM

## 2023-02-28 DIAGNOSIS — Z12.5 SCREENING PSA (PROSTATE SPECIFIC ANTIGEN): ICD-10-CM

## 2023-02-28 DIAGNOSIS — E66.09 CLASS 1 OBESITY DUE TO EXCESS CALORIES WITH SERIOUS COMORBIDITY AND BODY MASS INDEX (BMI) OF 30.0 TO 30.9 IN ADULT: ICD-10-CM

## 2023-02-28 DIAGNOSIS — E78.2 MIXED HYPERLIPIDEMIA: Primary | ICD-10-CM

## 2023-02-28 DIAGNOSIS — Z83.3 FAMILY HISTORY OF DIABETES MELLITUS: ICD-10-CM

## 2023-02-28 DIAGNOSIS — R53.83 LETHARGY: ICD-10-CM

## 2023-02-28 DIAGNOSIS — Z87.891 HISTORY OF TOBACCO USE: ICD-10-CM

## 2023-02-28 DIAGNOSIS — Z00.01 ENCOUNTER FOR GENERAL ADULT MEDICAL EXAMINATION WITH ABNORMAL FINDINGS: ICD-10-CM

## 2023-02-28 DIAGNOSIS — K63.5 HYPERPLASTIC COLONIC POLYP, UNSPECIFIED PART OF COLON: ICD-10-CM

## 2023-02-28 PROCEDURE — 99396 PREV VISIT EST AGE 40-64: CPT | Performed by: FAMILY MEDICINE

## 2023-02-28 PROCEDURE — 99214 OFFICE O/P EST MOD 30 MIN: CPT | Performed by: FAMILY MEDICINE

## 2023-02-28 RX ORDER — PAROXETINE 10 MG/1
10 TABLET, FILM COATED ORAL EVERY MORNING
Qty: 30 TABLET | Refills: 12
Start: 2023-02-28

## 2023-02-28 RX ORDER — LEVETIRACETAM 500 MG/1
500 TABLET ORAL 3 TIMES DAILY
Qty: 270 TABLET | Refills: 5
Start: 2023-02-28

## 2023-02-28 RX ORDER — CHLORAL HYDRATE 500 MG
1000 CAPSULE ORAL
Qty: 90 CAPSULE | Refills: 12
Start: 2023-02-28

## 2023-07-19 ENCOUNTER — OFFICE (OUTPATIENT)
Dept: URBAN - METROPOLITAN AREA PATHOLOGY 4 | Facility: PATHOLOGY | Age: 63
End: 2023-07-19
Payer: MEDICARE

## 2023-07-19 ENCOUNTER — ON CAMPUS - OUTPATIENT (OUTPATIENT)
Dept: URBAN - METROPOLITAN AREA HOSPITAL 2 | Facility: HOSPITAL | Age: 63
End: 2023-07-19
Payer: MEDICARE

## 2023-07-19 VITALS
SYSTOLIC BLOOD PRESSURE: 114 MMHG | RESPIRATION RATE: 18 BRPM | HEART RATE: 60 BPM | SYSTOLIC BLOOD PRESSURE: 117 MMHG | DIASTOLIC BLOOD PRESSURE: 73 MMHG | DIASTOLIC BLOOD PRESSURE: 84 MMHG | SYSTOLIC BLOOD PRESSURE: 118 MMHG | SYSTOLIC BLOOD PRESSURE: 115 MMHG | WEIGHT: 206 LBS | DIASTOLIC BLOOD PRESSURE: 87 MMHG | SYSTOLIC BLOOD PRESSURE: 136 MMHG | RESPIRATION RATE: 15 BRPM | OXYGEN SATURATION: 100 % | SYSTOLIC BLOOD PRESSURE: 133 MMHG | RESPIRATION RATE: 17 BRPM | HEART RATE: 59 BPM | HEART RATE: 53 BPM | DIASTOLIC BLOOD PRESSURE: 79 MMHG | DIASTOLIC BLOOD PRESSURE: 81 MMHG | HEART RATE: 62 BPM | SYSTOLIC BLOOD PRESSURE: 123 MMHG | RESPIRATION RATE: 16 BRPM | SYSTOLIC BLOOD PRESSURE: 116 MMHG | TEMPERATURE: 98.4 F | HEART RATE: 66 BPM | SYSTOLIC BLOOD PRESSURE: 147 MMHG | DIASTOLIC BLOOD PRESSURE: 77 MMHG | HEART RATE: 51 BPM | HEART RATE: 61 BPM | DIASTOLIC BLOOD PRESSURE: 85 MMHG | DIASTOLIC BLOOD PRESSURE: 80 MMHG | OXYGEN SATURATION: 98 % | SYSTOLIC BLOOD PRESSURE: 105 MMHG | OXYGEN SATURATION: 99 % | DIASTOLIC BLOOD PRESSURE: 86 MMHG | HEART RATE: 56 BPM | HEART RATE: 44 BPM | HEIGHT: 70 IN

## 2023-07-19 DIAGNOSIS — D12.4 BENIGN NEOPLASM OF DESCENDING COLON: ICD-10-CM

## 2023-07-19 DIAGNOSIS — Z12.11 ENCOUNTER FOR SCREENING FOR MALIGNANT NEOPLASM OF COLON: ICD-10-CM

## 2023-07-19 DIAGNOSIS — D12.3 BENIGN NEOPLASM OF TRANSVERSE COLON: ICD-10-CM

## 2023-07-19 DIAGNOSIS — D12.5 BENIGN NEOPLASM OF SIGMOID COLON: ICD-10-CM

## 2023-07-19 DIAGNOSIS — K57.30 DIVERTICULOSIS OF LARGE INTESTINE WITHOUT PERFORATION OR ABS: ICD-10-CM

## 2023-07-19 PROBLEM — K63.5 POLYP OF COLON: Status: ACTIVE | Noted: 2023-07-19

## 2023-07-19 LAB
GI HISTOLOGY: A. UNSPECIFIED: (no result)
GI HISTOLOGY: B. UNSPECIFIED: (no result)
GI HISTOLOGY: C. UNSPECIFIED: (no result)
GI HISTOLOGY: D. UNSPECIFIED: (no result)
GI HISTOLOGY: PDF REPORT: (no result)

## 2023-07-19 PROCEDURE — 45385 COLONOSCOPY W/LESION REMOVAL: CPT | Mod: 33 | Performed by: INTERNAL MEDICINE

## 2023-07-19 PROCEDURE — 88305 TISSUE EXAM BY PATHOLOGIST: CPT | Performed by: INTERNAL MEDICINE

## 2023-07-31 ENCOUNTER — OFFICE VISIT (OUTPATIENT)
Dept: FAMILY MEDICINE CLINIC | Facility: CLINIC | Age: 63
End: 2023-07-31
Payer: COMMERCIAL

## 2023-07-31 VITALS
SYSTOLIC BLOOD PRESSURE: 142 MMHG | HEART RATE: 73 BPM | BODY MASS INDEX: 30.67 KG/M2 | TEMPERATURE: 97.3 F | RESPIRATION RATE: 18 BRPM | WEIGHT: 214.2 LBS | HEIGHT: 70 IN | OXYGEN SATURATION: 98 % | DIASTOLIC BLOOD PRESSURE: 84 MMHG

## 2023-07-31 DIAGNOSIS — G89.29 CHRONIC BILATERAL LOW BACK PAIN WITHOUT SCIATICA: ICD-10-CM

## 2023-07-31 DIAGNOSIS — M54.50 CHRONIC BILATERAL LOW BACK PAIN WITHOUT SCIATICA: ICD-10-CM

## 2023-07-31 DIAGNOSIS — D12.6 ADENOMATOUS POLYP OF COLON, UNSPECIFIED PART OF COLON: ICD-10-CM

## 2023-07-31 DIAGNOSIS — E66.09 CLASS 1 OBESITY DUE TO EXCESS CALORIES WITH SERIOUS COMORBIDITY AND BODY MASS INDEX (BMI) OF 30.0 TO 30.9 IN ADULT: ICD-10-CM

## 2023-07-31 DIAGNOSIS — F32.A DEPRESSION, UNSPECIFIED DEPRESSION TYPE: ICD-10-CM

## 2023-07-31 DIAGNOSIS — R56.9 SEIZURES: ICD-10-CM

## 2023-07-31 DIAGNOSIS — E78.2 MIXED HYPERLIPIDEMIA: Primary | ICD-10-CM

## 2023-07-31 PROCEDURE — 99214 OFFICE O/P EST MOD 30 MIN: CPT | Performed by: FAMILY MEDICINE

## 2023-07-31 RX ORDER — MELOXICAM 15 MG/1
15 TABLET ORAL DAILY PRN
Qty: 30 TABLET | Refills: 5 | Status: SHIPPED | OUTPATIENT
Start: 2023-07-31

## 2023-07-31 RX ORDER — CHLORAL HYDRATE 500 MG
1000 CAPSULE ORAL
Qty: 90 CAPSULE | Refills: 12
Start: 2023-07-31

## 2023-07-31 RX ORDER — PAROXETINE 10 MG/1
10 TABLET, FILM COATED ORAL EVERY MORNING
Qty: 30 TABLET | Refills: 12 | Status: SHIPPED | OUTPATIENT
Start: 2023-07-31

## 2023-10-02 ENCOUNTER — TELEPHONE (OUTPATIENT)
Dept: FAMILY MEDICINE CLINIC | Facility: CLINIC | Age: 63
End: 2023-10-02
Payer: COMMERCIAL

## 2023-10-02 ENCOUNTER — OFFICE VISIT (OUTPATIENT)
Dept: FAMILY MEDICINE CLINIC | Facility: CLINIC | Age: 63
End: 2023-10-02
Payer: COMMERCIAL

## 2023-10-02 VITALS
TEMPERATURE: 98 F | HEART RATE: 86 BPM | SYSTOLIC BLOOD PRESSURE: 136 MMHG | WEIGHT: 214.4 LBS | HEIGHT: 71 IN | RESPIRATION RATE: 18 BRPM | DIASTOLIC BLOOD PRESSURE: 90 MMHG | BODY MASS INDEX: 30.02 KG/M2 | OXYGEN SATURATION: 96 %

## 2023-10-02 DIAGNOSIS — E66.09 CLASS 1 OBESITY DUE TO EXCESS CALORIES WITH SERIOUS COMORBIDITY AND BODY MASS INDEX (BMI) OF 30.0 TO 30.9 IN ADULT: ICD-10-CM

## 2023-10-02 DIAGNOSIS — R56.9 SEIZURES: Primary | ICD-10-CM

## 2023-10-02 DIAGNOSIS — F32.A DEPRESSION, UNSPECIFIED DEPRESSION TYPE: ICD-10-CM

## 2023-10-02 PROCEDURE — 99213 OFFICE O/P EST LOW 20 MIN: CPT | Performed by: FAMILY MEDICINE

## 2023-10-02 NOTE — ASSESSMENT & PLAN NOTE
Doing well with Paxil.  .Patient tolerated Paxil well without side effects. I feel the benefits of the medication outweigh the risks.

## 2023-10-02 NOTE — PROGRESS NOTES
Subjective   Mook Bose is a 63 y.o. male.     History of Present Illness  Patient stated while driving yesterday, he felt like he blinked out, he did not black out.  Depression  Visit Type: follow-up  Patient presents with the following symptoms: depressed mood.  Frequency of symptoms: constantly   Severity: mild   Sleep quality: good  Compliance with medications:  %         The following portions of the patient's history were reviewed and updated as appropriate: allergies, current medications, past family history, past medical history, past social history, past surgical history, and problem list.    Family History   Problem Relation Age of Onset    Alzheimer's disease Mother 62    Cancer Father         Unknown    Alzheimer's disease Sister 70    Heart disease Brother         dx at 59 living at 75    No Known Problems Daughter     No Known Problems Son     Heart disease Maternal Grandfather         MI at 44,  at 78    Stroke Maternal Grandfather     Hypertension Brother     Cancer Brother         dx at 49, living at 66    Other Brother         HEP. C       Social History     Tobacco Use    Smoking status: Former     Packs/day: 1.00     Years: 26.00     Additional pack years: 0.00     Total pack years: 26.00     Types: Cigars, Cigarettes     Start date: 1974     Quit date:      Years since quittin.7    Smokeless tobacco: Never   Vaping Use    Vaping Use: Never used   Substance Use Topics    Alcohol use: No    Drug use: No       Past Surgical History:   Procedure Laterality Date    CRANIOTOMY  2009    VASECTOMY         Patient Active Problem List   Diagnosis    Seizures    Memory loss    Closed nondisplaced fracture of sixth cervical vertebra    Subdural hematoma    Class 1 obesity due to excess calories with serious comorbidity and body mass index (BMI) of 30.0 to 30.9 in adult    History of tobacco use    Hearing loss    Lethargy    Screening PSA (prostate specific  "antigen)    Hyperlipidemia    Encounter for general adult medical examination with abnormal findings    Deviated septum    Carpal tunnel syndrome of right wrist    Cervical disc disease    Hyperplastic colon polyp    Chronic bilateral low back pain without sciatica    Ganglion cyst    Family history of coronary artery disease    Family history of diabetes mellitus    Obstructive sleep apnea syndrome    Depression    Adenomatous colon polyp       Current Outpatient Medications on File Prior to Visit   Medication Sig Dispense Refill    aspirin 81 MG tablet Take 1 tablet by mouth Daily.      levETIRAcetam (KEPPRA) 500 MG tablet Take 1 tablet by mouth 3 (Three) Times a Day. 270 tablet 5    meloxicam (Mobic) 15 MG tablet Take 1 tablet by mouth Daily As Needed for Mild Pain. 30 tablet 5    Omega-3 Fatty Acids (fish oil) 1000 MG capsule capsule Take 1 capsule by mouth Daily With Breakfast. 90 capsule 12    PARoxetine (PAXIL) 10 MG tablet Take 1 tablet by mouth Every Morning. 30 tablet 12    vitamin C (ASCORBIC ACID) 250 MG tablet Take 1 tablet by mouth Daily. 30 tablet 12    vitamin E 400 UNIT capsule Take 1 capsule by mouth Daily. 60 capsule 5     No current facility-administered medications on file prior to visit.       Allergies   Allergen Reactions    Penicillins Hives       Review of Systems   Constitutional:  Negative for chills, diaphoresis, fatigue and fever.   Neurological:  Positive for seizures.   Psychiatric/Behavioral:  Positive for depressed mood.        Objective   Visit Vitals  /90 (BP Location: Left arm, Patient Position: Sitting, Cuff Size: Adult)   Pulse 86   Temp 98 °F (36.7 °C) (Temporal)   Resp 18   Ht 179.1 cm (70.5\")   Wt 97.3 kg (214 lb 6.4 oz)   SpO2 96%   BMI 30.33 kg/m²     Physical Exam  Vitals and nursing note reviewed.   Constitutional:       Appearance: He is well-developed.   HENT:      Head: Normocephalic.   Neck:      Thyroid: No thyromegaly.      Vascular: No carotid bruit.      " Trachea: Trachea normal.   Cardiovascular:      Rate and Rhythm: Normal rate and regular rhythm.      Heart sounds: No murmur heard.     No friction rub. No gallop.   Pulmonary:      Effort: Pulmonary effort is normal. No respiratory distress.      Breath sounds: Normal breath sounds. No wheezing.   Chest:      Chest wall: No tenderness.   Musculoskeletal:      Cervical back: Neck supple.   Skin:     General: Skin is dry.      Findings: No rash.      Nails: There is no clubbing.   Neurological:      Mental Status: He is alert and oriented to person, place, and time.      Cranial Nerves: Cranial nerves 2-12 are intact.   Psychiatric:         Behavior: Behavior is cooperative.           Assessment & Plan .  Problem List Items Addressed This Visit          High    Seizures - Primary    Overview     Last seizure was 2013  Followed with Dr. Seiple         Current Assessment & Plan     Possible recurrence. --Offered neurologist referral and EEG.  Pt. Stated he is scheduled to follow with Dr. Seiple 10-25-23.   Patient tolerated Keppra well without side effects. I feel the benefits of the medication outweigh the risks.          Relevant Orders    EEG       Unprioritized    Class 1 obesity due to excess calories with serious comorbidity and body mass index (BMI) of 30.0 to 30.9 in adult    Current Assessment & Plan     Patient's (Body mass index is 30.33 kg/m².) indicates that they are obese (BMI >30) with health conditions that include dyslipidemias . Weight is unchanged. BMI  is above average; BMI management plan is completed. We discussed portion control and increasing exercise.          Depression    Current Assessment & Plan     Doing well with Paxil.  .Patient tolerated Paxil well without side effects. I feel the benefits of the medication outweigh the risks.

## 2023-10-02 NOTE — TELEPHONE ENCOUNTER
Caller: Mook Bose    Relationship to patient: Self    Best call back number: [phone]    Patient is needing:     HAD AN EPISODE OF BLACK OUT, AND DOESN'T REALLY KNOW WHAT HAPPENED AND CAN'T REMEMBER.    WOULD LIKE A CALLBACK TO FROM DOCTOR THURSTON TO DISCUSS.

## 2023-10-02 NOTE — ASSESSMENT & PLAN NOTE
Patient's (Body mass index is 30.33 kg/m².) indicates that they are obese (BMI >30) with health conditions that include dyslipidemias . Weight is unchanged. BMI  is above average; BMI management plan is completed. We discussed portion control and increasing exercise.

## 2023-10-02 NOTE — ASSESSMENT & PLAN NOTE
Possible recurrence. --Offered neurologist referral and EEG.  Pt. Stated he is scheduled to follow with Dr. Seiple 10-25-23.   Patient tolerated Keppra well without side effects. I feel the benefits of the medication outweigh the risks.

## 2023-10-19 ENCOUNTER — HOSPITAL ENCOUNTER (OUTPATIENT)
Dept: NEUROLOGY | Facility: HOSPITAL | Age: 63
Discharge: HOME | End: 2023-10-19
Payer: COMMERCIAL

## 2023-10-19 PROCEDURE — 95819 EEG AWAKE AND ASLEEP: CPT

## 2023-10-31 ENCOUNTER — TELEPHONE (OUTPATIENT)
Dept: FAMILY MEDICINE CLINIC | Facility: CLINIC | Age: 63
End: 2023-10-31
Payer: COMMERCIAL

## 2023-10-31 NOTE — TELEPHONE ENCOUNTER
Caller: Mook Bose    Relationship: Self    Best call back number:   Mook Bose (Self) [phone] (Mobile)       What was the call regarding: WANTED TO KNOW IF HE SHOULD COME IN AND HAVE LABS DRAWN BEFORE UPCOMING APPT      Is it okay if the provider responds through MyChart: CALL PLEASE

## 2023-11-02 ENCOUNTER — OFFICE VISIT (OUTPATIENT)
Dept: FAMILY MEDICINE CLINIC | Facility: CLINIC | Age: 63
End: 2023-11-02
Payer: COMMERCIAL

## 2023-11-02 ENCOUNTER — HOSPITAL ENCOUNTER (OUTPATIENT)
Dept: GENERAL RADIOLOGY | Facility: HOSPITAL | Age: 63
Discharge: HOME | End: 2023-11-02
Admitting: FAMILY MEDICINE
Payer: COMMERCIAL

## 2023-11-02 VITALS
WEIGHT: 215 LBS | TEMPERATURE: 97.3 F | HEIGHT: 71 IN | HEART RATE: 72 BPM | RESPIRATION RATE: 18 BRPM | SYSTOLIC BLOOD PRESSURE: 118 MMHG | DIASTOLIC BLOOD PRESSURE: 78 MMHG | OXYGEN SATURATION: 98 % | BODY MASS INDEX: 30.1 KG/M2

## 2023-11-02 DIAGNOSIS — R56.9 SEIZURES: ICD-10-CM

## 2023-11-02 DIAGNOSIS — M94.0 COSTOCHONDRITIS: ICD-10-CM

## 2023-11-02 DIAGNOSIS — R09.1 PLEURISY: ICD-10-CM

## 2023-11-02 DIAGNOSIS — E66.09 CLASS 1 OBESITY DUE TO EXCESS CALORIES WITH SERIOUS COMORBIDITY AND BODY MASS INDEX (BMI) OF 30.0 TO 30.9 IN ADULT: ICD-10-CM

## 2023-11-02 DIAGNOSIS — J40 BRONCHITIS: ICD-10-CM

## 2023-11-02 DIAGNOSIS — E78.2 MIXED HYPERLIPIDEMIA: Primary | ICD-10-CM

## 2023-11-02 DIAGNOSIS — R07.9 CHEST PAIN, UNSPECIFIED TYPE: ICD-10-CM

## 2023-11-02 LAB
ALBUMIN SERPL-MCNC: 4 G/DL (ref 3.9–4.9)
ALBUMIN/GLOB SERPL: 1.3 {RATIO} (ref 1.2–2.2)
ALP SERPL-CCNC: 60 IU/L (ref 44–121)
ALT SERPL-CCNC: 14 IU/L (ref 0–44)
AST SERPL-CCNC: 14 IU/L (ref 0–40)
BILIRUB SERPL-MCNC: 0.5 MG/DL (ref 0–1.2)
BUN SERPL-MCNC: 19 MG/DL (ref 8–27)
BUN/CREAT SERPL: 23 (ref 10–24)
CALCIUM SERPL-MCNC: 9.4 MG/DL (ref 8.6–10.2)
CHLORIDE SERPL-SCNC: 100 MMOL/L (ref 96–106)
CHOLEST SERPL-MCNC: 155 MG/DL (ref 100–199)
CHOLEST/HDLC SERPL: 3.5 RATIO (ref 0–5)
CO2 SERPL-SCNC: 26 MMOL/L (ref 20–29)
CREAT SERPL-MCNC: 0.84 MG/DL (ref 0.76–1.27)
EGFRCR SERPLBLD CKD-EPI 2021: 97 ML/MIN/1.73
GLOBULIN SER CALC-MCNC: 3 G/DL (ref 1.5–4.5)
GLUCOSE SERPL-MCNC: 97 MG/DL (ref 70–99)
HDLC SERPL-MCNC: 44 MG/DL
LDLC SERPL CALC-MCNC: 99 MG/DL (ref 0–99)
POTASSIUM SERPL-SCNC: 4.7 MMOL/L (ref 3.5–5.2)
PROT SERPL-MCNC: 7 G/DL (ref 6–8.5)
SODIUM SERPL-SCNC: 140 MMOL/L (ref 134–144)
TRIGL SERPL-MCNC: 60 MG/DL (ref 0–149)
VLDLC SERPL CALC-MCNC: 12 MG/DL (ref 5–40)

## 2023-11-02 PROCEDURE — 71046 X-RAY EXAM CHEST 2 VIEWS: CPT

## 2023-11-02 RX ORDER — DOXYCYCLINE 100 MG/1
100 CAPSULE ORAL 2 TIMES DAILY
Qty: 20 CAPSULE | Refills: 0 | Status: SHIPPED | OUTPATIENT
Start: 2023-11-02

## 2023-11-02 NOTE — ASSESSMENT & PLAN NOTE
New dx.  Sounds non-cardiac.  Probably 2nd to Pleurisy or costochondritis EKG done today, read by me, reviewed with pt.  EKG showed sinus liset with vent rate of 56 and Left atrial enlargement, no ischemia-no EKG for comparison.

## 2023-11-02 NOTE — ASSESSMENT & PLAN NOTE
New dx.  Discussed restarting Mobic, pt. Declines due to Mobic making him feel bad in the past.  Chest xray ordered and pt. Will return on 11-6-23

## 2023-11-02 NOTE — ASSESSMENT & PLAN NOTE
Lipid and CMP done 11-1-23, read by me, reviewed with pt.  Lipid 60 up from 57, Tot. Chol. 155 up from 150, HDL 44 up from 41, LDL 99 up from 97  Doing well.  Encouraged to watch fatty intake, exercise more, and lose weight.   Compliant with medication.  Patient tolerated Fish Oil well without side effects. I feel the benefits of the medication outweigh the risks.   Is getting adequate diet and exercise  Goals developed at last visit were met   Follow up in  3 months  Care management needs are self-addressed. Self-management abilities addressed and patient is capable of managing his own disease.

## 2023-11-02 NOTE — ASSESSMENT & PLAN NOTE
Worse.  EEG done 10-19-23, results reviewed with pt.  Advised to discuss further with Dr. Seiple on 11-8-23.  Patient tolerated Keppra well without side effects. I feel the benefits of the medication outweigh the risks.

## 2023-11-02 NOTE — ASSESSMENT & PLAN NOTE
New dx.   Probable cause of chest discomfort.   Start doxycyline, chest xray ordered and pt. Will return on 11-6-23

## 2023-11-02 NOTE — PROGRESS NOTES
Subjective   Mook Bose is a 63 y.o. male.     Hyperlipidemia  This is a chronic problem. The current episode started more than 1 year ago. Associated symptoms include chest pain and shortness of breath.   Seizures   Associated symptoms include chest pain. Pertinent negatives include no nausea and no vomiting.   Chest Pain   This is a new problem. The current episode started in the past 7 days (). The problem occurs intermittently. The problem has been unchanged. The pain is present in the lateral region (Right lateral). The pain is mild. The quality of the pain is described as sharp. The pain does not radiate. Associated symptoms include shortness of breath. Pertinent negatives include no diaphoresis, dizziness, fever, irregular heartbeat, nausea, near-syncope, palpitations, syncope or vomiting. The pain is aggravated by lifting (when laying on right side). He has tried nothing for the symptoms. The treatment provided no relief.   His past medical history is significant for hyperlipidemia and seizures.        The following portions of the patient's history were reviewed and updated as appropriate: allergies, current medications, past family history, past medical history, past social history, past surgical history, and problem list.    Family History   Problem Relation Age of Onset    Alzheimer's disease Mother 62    Cancer Father         Unknown    Alzheimer's disease Sister 70    Heart disease Brother         dx at 59 living at 75    No Known Problems Daughter     No Known Problems Son     Heart disease Maternal Grandfather         MI at 44,  at 78    Stroke Maternal Grandfather     Hypertension Brother     Cancer Brother         dx at 49, living at 66    Other Brother         HEP. C       Social History     Tobacco Use    Smoking status: Former     Packs/day: 1.00     Years: 26.00     Additional pack years: 0.00     Total pack years: 26.00     Types: Cigars, Cigarettes     Start date:  1974     Quit date:      Years since quittin.8    Smokeless tobacco: Never   Vaping Use    Vaping Use: Never used   Substance Use Topics    Alcohol use: No    Drug use: No       Past Surgical History:   Procedure Laterality Date    CRANIOTOMY  2009    VASECTOMY  1992       Patient Active Problem List   Diagnosis    Seizures    Memory loss    Closed nondisplaced fracture of sixth cervical vertebra    Subdural hematoma    Class 1 obesity due to excess calories with serious comorbidity and body mass index (BMI) of 30.0 to 30.9 in adult    History of tobacco use    Hearing loss    Lethargy    Screening PSA (prostate specific antigen)    Hyperlipidemia    Encounter for general adult medical examination with abnormal findings    Deviated septum    Carpal tunnel syndrome of right wrist    Cervical disc disease    Hyperplastic colon polyp    Chronic bilateral low back pain without sciatica    Ganglion cyst    Family history of coronary artery disease    Family history of diabetes mellitus    Obstructive sleep apnea syndrome    Depression    Adenomatous colon polyp    Chest pain    Costochondritis    Pleurisy    Bronchitis       Current Outpatient Medications on File Prior to Visit   Medication Sig Dispense Refill    aspirin 81 MG tablet Take 1 tablet by mouth Daily.      levETIRAcetam (KEPPRA) 500 MG tablet Take 1 tablet by mouth 3 (Three) Times a Day. 270 tablet 5    Omega-3 Fatty Acids (fish oil) 1000 MG capsule capsule Take 1 capsule by mouth Daily With Breakfast. 90 capsule 12    PARoxetine (PAXIL) 10 MG tablet Take 1 tablet by mouth Every Morning. 30 tablet 12    vitamin C (ASCORBIC ACID) 250 MG tablet Take 1 tablet by mouth Daily. 30 tablet 12    vitamin E 400 UNIT capsule Take 1 capsule by mouth Daily. 60 capsule 5    meloxicam (Mobic) 15 MG tablet Take 1 tablet by mouth Daily As Needed for Mild Pain. (Patient not taking: Reported on 2023) 30 tablet 5     No current facility-administered  "medications on file prior to visit.       Allergies   Allergen Reactions    Penicillins Hives       Review of Systems   Constitutional:  Negative for chills, diaphoresis, fatigue and fever.   Respiratory:  Positive for shortness of breath.    Cardiovascular:  Positive for chest pain. Negative for palpitations, syncope and near-syncope.   Gastrointestinal:  Negative for nausea and vomiting.   Neurological:  Positive for seizures. Negative for dizziness.       Objective   Visit Vitals  /78 (BP Location: Left arm, Patient Position: Sitting, Cuff Size: Adult)   Pulse 72   Temp 97.3 °F (36.3 °C) (Temporal)   Resp 18   Ht 179.1 cm (70.5\")   Wt 97.5 kg (215 lb)   SpO2 98%   BMI 30.41 kg/m²     Physical Exam  Vitals and nursing note reviewed.   Constitutional:       Appearance: He is well-developed.   HENT:      Head: Normocephalic.   Neck:      Thyroid: No thyromegaly.      Vascular: No carotid bruit.      Trachea: Trachea normal.   Cardiovascular:      Rate and Rhythm: Normal rate and regular rhythm.      Heart sounds: No murmur heard.     No friction rub. No gallop.   Pulmonary:      Effort: Pulmonary effort is normal. No respiratory distress.      Breath sounds: Normal breath sounds. No wheezing.   Chest:      Chest wall: No tenderness.      Comments: Right lower ribs are  tender to pressure reproducing his pain  Musculoskeletal:      Cervical back: Neck supple.   Skin:     General: Skin is dry.      Findings: No rash.      Nails: There is no clubbing.   Neurological:      Mental Status: He is alert and oriented to person, place, and time.   Psychiatric:         Behavior: Behavior is cooperative.           Assessment & Plan .  Problem List Items Addressed This Visit          High    Seizures    Overview     Last seizure was 10-1-2023  Followed with Dr. Seiple  EEG done 10-19-23,         Current Assessment & Plan     Worse.  EEG done 10-19-23, results reviewed with pt.  Advised to discuss further with Dr. Seiple on " 11-8-23.  Patient tolerated Keppra well without side effects. I feel the benefits of the medication outweigh the risks.             Medium    Hyperlipidemia - Primary    Overview     He is not on a statin but needs a calcium score         Current Assessment & Plan     Lipid and CMP done 11-1-23, read by me, reviewed with pt.  Lipid 60 up from 57, Tot. Chol. 155 up from 150, HDL 44 up from 41, LDL 99 up from 97  Doing well.  Encouraged to watch fatty intake, exercise more, and lose weight.   Compliant with medication.  Patient tolerated Fish Oil well without side effects. I feel the benefits of the medication outweigh the risks.   Is getting adequate diet and exercise  Goals developed at last visit were met   Follow up in  3 months  Care management needs are self-addressed. Self-management abilities addressed and patient is capable of managing his own disease.              Unprioritized    Bronchitis    Current Assessment & Plan     New dx.   Probable cause of chest discomfort.   Start doxycyline, chest xray ordered and pt. Will return on 11-6-23         Relevant Medications    doxycycline (MONODOX) 100 MG capsule    Other Relevant Orders    XR Chest PA & Lateral (Completed)    Chest pain    Current Assessment & Plan     New dx.  Sounds non-cardiac.  Probably 2nd to Pleurisy or costochondritis EKG done today, read by me, reviewed with pt.  EKG showed sinus liset with vent rate of 56 and Left atrial enlargement, no ischemia-no EKG for comparison.         Relevant Orders    ECG 12 Lead    Class 1 obesity due to excess calories with serious comorbidity and body mass index (BMI) of 30.0 to 30.9 in adult    Current Assessment & Plan     Patient's (Body mass index is 30.41 kg/m².) indicates that they are obese (BMI >30) with health conditions that include dyslipidemias . Weight is worsening. BMI  is above average; BMI management plan is completed. We discussed portion control and increasing exercise.           Costochondritis    Current Assessment & Plan     New dx.  Discussed restarting Mobic, pt. Declines due to Mobic making him feel bad in the past.  Chest xray ordered and pt. Will return on 11-6-23         Pleurisy    Current Assessment & Plan     New dx.  --Start doxycyline, chest xray ordered and pt. Will return on 11-6-23

## 2023-11-02 NOTE — ASSESSMENT & PLAN NOTE
Patient's (Body mass index is 30.41 kg/m².) indicates that they are obese (BMI >30) with health conditions that include dyslipidemias . Weight is worsening. BMI  is above average; BMI management plan is completed. We discussed portion control and increasing exercise.

## 2023-11-06 ENCOUNTER — OFFICE VISIT (OUTPATIENT)
Dept: FAMILY MEDICINE CLINIC | Facility: CLINIC | Age: 63
End: 2023-11-06
Payer: COMMERCIAL

## 2023-11-06 VITALS
TEMPERATURE: 97.8 F | HEIGHT: 71 IN | RESPIRATION RATE: 18 BRPM | SYSTOLIC BLOOD PRESSURE: 140 MMHG | WEIGHT: 214.5 LBS | HEART RATE: 74 BPM | DIASTOLIC BLOOD PRESSURE: 80 MMHG | BODY MASS INDEX: 30.03 KG/M2 | OXYGEN SATURATION: 98 %

## 2023-11-06 DIAGNOSIS — M94.0 COSTOCHONDRITIS: ICD-10-CM

## 2023-11-06 DIAGNOSIS — F32.A DEPRESSION, UNSPECIFIED DEPRESSION TYPE: ICD-10-CM

## 2023-11-06 DIAGNOSIS — J40 BRONCHITIS: ICD-10-CM

## 2023-11-06 DIAGNOSIS — R09.1 PLEURISY: Primary | ICD-10-CM

## 2023-11-06 DIAGNOSIS — E66.09 CLASS 1 OBESITY DUE TO EXCESS CALORIES WITH SERIOUS COMORBIDITY AND BODY MASS INDEX (BMI) OF 30.0 TO 30.9 IN ADULT: ICD-10-CM

## 2023-11-06 PROCEDURE — 99214 OFFICE O/P EST MOD 30 MIN: CPT | Performed by: FAMILY MEDICINE

## 2023-11-06 NOTE — ASSESSMENT & PLAN NOTE
Improving;  Patient tolerated doxycycline well without side effects. I feel the benefits of the medication outweigh the risks.

## 2023-11-06 NOTE — ASSESSMENT & PLAN NOTE
Improving on a scale of 0-10  he is 4-5  Patient tolerated mobic well without side effects. I feel the benefits of the medication outweigh the risks.  Chest x-ray reviewed with him shows no acute process

## 2023-11-06 NOTE — ASSESSMENT & PLAN NOTE
Patient's (Body mass index is 30.34 kg/m².) indicates that they are obese (BMI >30) with health conditions that include hypertension . Weight is unchanged. BMI  is above average; BMI management plan is completed. We discussed low calorie, low carb based diet program, portion control, and increasing exercise.

## 2023-11-06 NOTE — ASSESSMENT & PLAN NOTE
Improving on a scale of 0-10  he is 4-5  Patient tolerated mobic well without side effects. I feel the benefits of the medication outweigh the risks.  As pain improves try and get off the Mobic

## 2023-11-06 NOTE — PROGRESS NOTES
Subjective   Mook Bose is a 63 y.o. male.     URI   This is a chronic problem. The current episode started 1 to 4 weeks ago. The problem has been gradually improving. Associated symptoms include chest pain, congestion, coughing, rhinorrhea and sinus pain. Pertinent negatives include no abdominal pain, diarrhea, dysuria, ear pain, headaches, joint pain, joint swelling, nausea, neck pain, plugged ear sensation, rash, sneezing, sore throat, swollen glands, vomiting or wheezing. Associated symptoms comments: Patient was recently seen for URI and DX Bronchitis Chest Xray done : impression - no acute chest process identified.   On Doxycycline to treat .   Seizures   This is a chronic (He has had this for grearter than 15 years) problem. The current episode started more than 1 week ago. The problem has been gradually improving. Associated symptoms include chest pain and cough. Pertinent negatives include no sleepiness, no confusion, no headaches, no speech difficulty, no visual disturbance, no neck stiffness, no sore throat, no nausea, no vomiting, no diarrhea and no muscle weakness. The episode was Witnessed. There was No sensation of an aura present.   Chest Pain   This is a new problem. The current episode started in the past 7 days (Sunday). The onset quality is gradual. The problem occurs intermittently. The problem has been gradually improving. The pain is present in the lateral region (Right lateral). The pain is mild. The quality of the pain is described as sharp. The pain does not radiate. Associated symptoms include a cough. Pertinent negatives include no abdominal pain, diaphoresis, dizziness, fever, headaches, nausea or vomiting. Associated symptoms comments: Patient reports that he has not had chest pain but he state that his Right Rib cage has been hurting. He states that he rates it 4 out of 5 pain. He states that there is no rhyme and or reason for his pain. He states that he has some discomfort when  he moves certain ways and when he does certain things. . The cough is Productive. There is no color change (clearish greenish color) associated with the cough. The cough is worsened by activity. The pain is aggravated by lifting (when laying on right side). He has tried nothing for the symptoms. The treatment provided no relief.   His past medical history is significant for hyperlipidemia and seizures.        The following portions of the patient's history were reviewed and updated as appropriate: allergies, current medications, past family history, past medical history, past social history, past surgical history, and problem list.    Family History   Problem Relation Age of Onset    Alzheimer's disease Mother 62    Cancer Father         Unknown    Alzheimer's disease Sister 70    Heart disease Brother         dx at 59 living at 75    No Known Problems Daughter     No Known Problems Son     Heart disease Maternal Grandfather         MI at 44,  at 78    Stroke Maternal Grandfather     Hypertension Brother     Cancer Brother         dx at 49, living at 66    Other Brother         HEP. C       Social History     Tobacco Use    Smoking status: Former     Packs/day: 1.00     Years: 26.00     Additional pack years: 0.00     Total pack years: 26.00     Types: Cigars, Cigarettes     Start date: 1974     Quit date:      Years since quittin.8     Passive exposure: Past    Smokeless tobacco: Never   Vaping Use    Vaping Use: Never used   Substance Use Topics    Alcohol use: No    Drug use: No       Past Surgical History:   Procedure Laterality Date    CRANIOTOMY  2009    VASECTOMY         Patient Active Problem List   Diagnosis    Seizures    Memory loss    Closed nondisplaced fracture of sixth cervical vertebra    Subdural hematoma    Class 1 obesity due to excess calories with serious comorbidity and body mass index (BMI) of 30.0 to 30.9 in adult    History of tobacco use    Hearing loss     Lethargy    Screening PSA (prostate specific antigen)    Hyperlipidemia    Encounter for general adult medical examination with abnormal findings    Deviated septum    Carpal tunnel syndrome of right wrist    Cervical disc disease    Hyperplastic colon polyp    Chronic bilateral low back pain without sciatica    Ganglion cyst    Family history of coronary artery disease    Family history of diabetes mellitus    Obstructive sleep apnea syndrome    Depression    Adenomatous colon polyp    Chest pain    Costochondritis    Pleurisy    Bronchitis       Current Outpatient Medications on File Prior to Visit   Medication Sig Dispense Refill    aspirin 81 MG tablet Take 1 tablet by mouth Daily.      doxycycline (MONODOX) 100 MG capsule Take 1 capsule by mouth 2 (Two) Times a Day. 20 capsule 0    levETIRAcetam (KEPPRA) 500 MG tablet Take 1 tablet by mouth 3 (Three) Times a Day. 270 tablet 5    meloxicam (Mobic) 15 MG tablet Take 1 tablet by mouth Daily As Needed for Mild Pain. 30 tablet 5    Omega-3 Fatty Acids (fish oil) 1000 MG capsule capsule Take 1 capsule by mouth Daily With Breakfast. 90 capsule 12    vitamin C (ASCORBIC ACID) 250 MG tablet Take 1 tablet by mouth Daily. 30 tablet 12    vitamin E 400 UNIT capsule Take 1 capsule by mouth Daily. 60 capsule 5    PARoxetine (PAXIL) 10 MG tablet Take 1 tablet by mouth Every Morning. (Patient not taking: Reported on 11/6/2023) 30 tablet 12     No current facility-administered medications on file prior to visit.       Allergies   Allergen Reactions    Penicillins Hives       Review of Systems   Constitutional:  Negative for diaphoresis and fever.   HENT:  Positive for congestion and rhinorrhea. Negative for ear pain, sneezing, sore throat and swollen glands.    Eyes:  Negative for visual disturbance.   Respiratory:  Positive for cough. Negative for wheezing.    Cardiovascular:  Positive for chest pain.   Gastrointestinal:  Negative for abdominal pain, diarrhea, nausea and  "vomiting.   Genitourinary:  Negative for dysuria.   Musculoskeletal:  Negative for joint pain and neck pain.   Skin:  Negative for rash.   Neurological:  Positive for seizures. Negative for dizziness, speech difficulty and confusion.       Objective   Visit Vitals  /80 (BP Location: Right arm, Patient Position: Sitting, Cuff Size: Adult)   Pulse 74   Temp 97.8 °F (36.6 °C) (Temporal)   Resp 18   Ht 179.1 cm (70.5\")   Wt 97.3 kg (214 lb 8 oz)   SpO2 98%   BMI 30.34 kg/m²     Physical Exam  Vitals and nursing note reviewed.   Constitutional:       Appearance: He is well-developed.   HENT:      Head: Normocephalic.   Neck:      Thyroid: No thyromegaly.      Vascular: No carotid bruit.      Trachea: Trachea normal.   Cardiovascular:      Rate and Rhythm: Normal rate and regular rhythm.      Heart sounds: No murmur heard.     No friction rub. No gallop.   Pulmonary:      Effort: Pulmonary effort is normal. No respiratory distress.      Breath sounds: Normal breath sounds. No wheezing.   Chest:      Chest wall: No tenderness.      Comments: Right lower ribs are  tender to pressure reproducing his pain  Musculoskeletal:      Cervical back: Neck supple.   Skin:     General: Skin is dry.      Findings: No rash.      Nails: There is no clubbing.   Neurological:      Mental Status: He is alert and oriented to person, place, and time.   Psychiatric:         Behavior: Behavior is cooperative.           Assessment & Plan .  Problem List Items Addressed This Visit          Unprioritized    Class 1 obesity due to excess calories with serious comorbidity and body mass index (BMI) of 30.0 to 30.9 in adult    Current Assessment & Plan     Patient's (Body mass index is 30.34 kg/m².) indicates that they are obese (BMI >30) with health conditions that include hypertension . Weight is unchanged. BMI  is above average; BMI management plan is completed. We discussed low calorie, low carb based diet program, portion control, and " increasing exercise.          Depression    Current Assessment & Plan     Doing well off Paxil-therefore will remain off at this time.         Costochondritis    Current Assessment & Plan     Improving on a scale of 0-10  he is 4-5  Patient tolerated mobic well without side effects. I feel the benefits of the medication outweigh the risks.           Pleurisy - Primary    Current Assessment & Plan     Improving on a scale of 0-10  he is 4-5  Patient tolerated mobic well without side effects. I feel the benefits of the medication outweigh the risks.         Bronchitis    Current Assessment & Plan     Improving;  Patient tolerated doxycycline well without side effects. I feel the benefits of the medication outweigh the risks.

## 2023-11-08 NOTE — PROGRESS NOTES
"Chief Complaint  Follow-up (TAMMY/CDL)    Subjective          Mook Bose presents to [facility]UR[facility]  History of Present Illness  F/u on TAMMY, patient states he is benefiting from pap therapy, he uses [facility] and get supplies from [place].     Seizures- patient had a seizures about 1 month ago witnessed by wife,   Once every week or two had mouth smacking episodes.   and poss another seizure 10 days after while sleep, with lip smacking episode followed by confusion  Started last fall on paxil 10mg now off this medication.    patient currently taking keppra 500 mg 1 bid     Sleep testing history:    On NPSG at [facility] , 6/28/2021 patient had  obstructive sleep apnea syndrome with apnea-hypopnea index of 24.9 per sleep hour, minimum SpO2 of 72%    PAP download:  The patient is on CPAP therapy at 7-16 cm/H2O.   Data indicates Excellent compliance. With 79% usage for more than 4 hours with an average usage of 7 hours 16 minutes. AHI down to 4.2 .  Average pressures 8.6.     The patient's hypersomnia has resolved       [place] Sleepiness Scale:  Sitting and reading 0 WatchingTV 1  Sitting, inactive, in a public place 0  As a passenger in a car for 1 hour w/o a break  2  Lying down to rest in the afternoon  3  Sitting and talking to someone  0  Sitting quietly after a lunch  1  In a car, while stopped for traffic or a light  0  Total 7    Review of Systems   Neurological:  Positive for seizures.   All other systems reviewed and are negative.        Objective   Vital Signs:   /80   Pulse 83   Resp 16   Ht 179.1 cm (70.5\")   Wt 97.1 kg (214 lb)   BMI 30.27 kg/m²     Physical Exam  Vitals reviewed.   Constitutional:       Appearance: Normal appearance.   HENT:      Nose: Nose normal.   Eyes:      Pupils: Pupils are equal, round, and reactive to light.   Pulmonary:      Effort: Pulmonary effort is normal. No respiratory distress.   Neurological:      Mental Status: He is alert and oriented to " person, place, and time.   Psychiatric:         Mood and Affect: Mood normal.        Result Review :                 Assessment and Plan    Diagnoses and all orders for this visit:    1. Obstructive sleep apnea syndrome (Primary)    2. Seizures  Overview:  Last seizure was 10-1-2023  Followed with Dr. Seiple  EEG done 10-19-23,    Orders:  -     levETIRAcetam (KEPPRA) 750 MG tablet; Take 1 tablet by mouth 2 (Two) Times a Day.      Continue, CPAP 7-16  The patient is compliant with and benefiting from PAP therapy.    Break through seizure  Stop Paxil ( may or may not have been the cause of the break through seizures)  No driving for 6 months  Increase keppra to 750mg bid .       Follow Up   Return in about 1 year (around 11/9/2024).    Patient was given instructions and counseling regarding his condition or for health maintenance advice. Please see specific information pulled into the AVS if appropriate.       This document has been electronically signed by Joseph Seipel, MD on November 9, 2023 11:38 EST

## 2023-11-09 ENCOUNTER — OFFICE VISIT (OUTPATIENT)
Dept: NEUROLOGY | Facility: CLINIC | Age: 63
End: 2023-11-09
Payer: COMMERCIAL

## 2023-11-09 VITALS
WEIGHT: 214 LBS | HEART RATE: 83 BPM | RESPIRATION RATE: 16 BRPM | DIASTOLIC BLOOD PRESSURE: 80 MMHG | HEIGHT: 71 IN | BODY MASS INDEX: 29.96 KG/M2 | SYSTOLIC BLOOD PRESSURE: 132 MMHG

## 2023-11-09 DIAGNOSIS — G47.33 OBSTRUCTIVE SLEEP APNEA SYNDROME: Primary | ICD-10-CM

## 2023-11-09 DIAGNOSIS — R56.9 SEIZURES: ICD-10-CM

## 2023-11-09 PROCEDURE — 99214 OFFICE O/P EST MOD 30 MIN: CPT | Performed by: PSYCHIATRY & NEUROLOGY

## 2023-11-09 RX ORDER — LEVETIRACETAM 750 MG/1
750 TABLET ORAL 2 TIMES DAILY
Start: 2023-11-09

## 2023-11-20 DIAGNOSIS — R56.9 SEIZURES: ICD-10-CM

## 2023-11-20 RX ORDER — LEVETIRACETAM 750 MG/1
750 TABLET ORAL 2 TIMES DAILY
Start: 2023-11-20 | End: 2023-11-27 | Stop reason: SDUPTHER

## 2023-11-20 NOTE — TELEPHONE ENCOUNTER
[facility] RX WAS NEVER SENT [facility]    Return to office in 3 weeks for New OB and as needed.    Thank you for allowing Irvin MASTERS CNM and our OB team to participate in your care.  If you have a scheduling or an appointment question please contact [facility] Unit Coordinator at their direct line [phone].   ALL nursing questions or concerns can be directed to your OB nurse at: [phone] Michelle Bhatia/Chandrika

## 2023-11-27 RX ORDER — LEVETIRACETAM 750 MG/1
750 TABLET ORAL 2 TIMES DAILY
Qty: 60 TABLET | Refills: 5 | Status: SHIPPED | OUTPATIENT
Start: 2023-11-27

## 2024-01-08 ENCOUNTER — OFFICE VISIT (OUTPATIENT)
Dept: FAMILY MEDICINE CLINIC | Facility: CLINIC | Age: 64
End: 2024-01-08
Payer: COMMERCIAL

## 2024-01-08 VITALS
DIASTOLIC BLOOD PRESSURE: 89 MMHG | RESPIRATION RATE: 14 BRPM | HEIGHT: 71 IN | TEMPERATURE: 97.7 F | OXYGEN SATURATION: 99 % | BODY MASS INDEX: 29.23 KG/M2 | HEART RATE: 77 BPM | SYSTOLIC BLOOD PRESSURE: 138 MMHG | WEIGHT: 208.8 LBS

## 2024-01-08 DIAGNOSIS — F32.A DEPRESSION, UNSPECIFIED DEPRESSION TYPE: ICD-10-CM

## 2024-01-08 DIAGNOSIS — S06.5XAA SUBDURAL HEMATOMA: ICD-10-CM

## 2024-01-08 DIAGNOSIS — G30.0 MILD EARLY ONSET ALZHEIMER'S DEMENTIA WITH ANXIETY: ICD-10-CM

## 2024-01-08 DIAGNOSIS — R41.3 MEMORY LOSS: Primary | ICD-10-CM

## 2024-01-08 DIAGNOSIS — F02.A4 MILD EARLY ONSET ALZHEIMER'S DEMENTIA WITH ANXIETY: ICD-10-CM

## 2024-01-08 DIAGNOSIS — R56.9 SEIZURES: ICD-10-CM

## 2024-01-08 PROBLEM — F02.A0 MILD EARLY ONSET ALZHEIMER'S DEMENTIA: Status: ACTIVE | Noted: 2024-01-08

## 2024-01-08 RX ORDER — DONEPEZIL HYDROCHLORIDE 5 MG/1
5 TABLET, FILM COATED ORAL NIGHTLY
Qty: 90 TABLET | Refills: 1 | Status: SHIPPED | OUTPATIENT
Start: 2024-01-08

## 2024-01-08 NOTE — PROGRESS NOTES
Subjective   Mook Bose is a 63 y.o. male.     Memory Loss  This is a recurrent problem. The current episode started more than 1 month ago. The problem occurs every several days. The problem has been gradually worsening. Pertinent negatives include no numbness or weakness.        The following portions of the patient's history were reviewed and updated as appropriate: allergies, current medications, past family history, past medical history, past social history, past surgical history, and problem list.    Family History   Problem Relation Age of Onset    Alzheimer's disease Mother 62    Cancer Father         Unknown    Alzheimer's disease Sister 70    Heart disease Brother         dx at 59 living at 75    No Known Problems Daughter     No Known Problems Son     Heart disease Maternal Grandfather         MI at 44,  at 78    Stroke Maternal Grandfather     Hypertension Brother     Cancer Brother         dx at 49, living at 66    Other Brother         HEP. C       Social History     Tobacco Use    Smoking status: Former     Packs/day: 1.00     Years: 26.00     Additional pack years: 0.00     Total pack years: 26.00     Types: Cigars, Cigarettes     Start date: 1974     Quit date:      Years since quittin.0     Passive exposure: Past    Smokeless tobacco: Never   Vaping Use    Vaping Use: Never used   Substance Use Topics    Alcohol use: No    Drug use: No       Past Surgical History:   Procedure Laterality Date    CRANIOTOMY  2009    VASECTOMY  1992       Patient Active Problem List   Diagnosis    Seizures    Memory loss    Closed nondisplaced fracture of sixth cervical vertebra    Subdural hematoma    Class 1 obesity due to excess calories with serious comorbidity and body mass index (BMI) of 30.0 to 30.9 in adult    History of tobacco use    Hearing loss    Lethargy    Screening PSA (prostate specific antigen)    Hyperlipidemia    Encounter for general adult medical examination with  "abnormal findings    Deviated septum    Carpal tunnel syndrome of right wrist    Cervical disc disease    Hyperplastic colon polyp    Chronic bilateral low back pain without sciatica    Ganglion cyst    Family history of coronary artery disease    Family history of diabetes mellitus    Obstructive sleep apnea syndrome    Depression    Adenomatous colon polyp    Chest pain    Costochondritis    Pleurisy    Bronchitis    Mild early onset Alzheimer's dementia       Current Outpatient Medications on File Prior to Visit   Medication Sig Dispense Refill    aspirin 81 MG tablet Take 1 tablet by mouth Daily.      levETIRAcetam (KEPPRA) 750 MG tablet Take 1 tablet by mouth 2 (Two) Times a Day. 60 tablet 5    meloxicam (Mobic) 15 MG tablet Take 1 tablet by mouth Daily As Needed for Mild Pain. 30 tablet 5    Omega-3 Fatty Acids (fish oil) 1000 MG capsule capsule Take 1 capsule by mouth Daily With Breakfast. 90 capsule 12    vitamin C (ASCORBIC ACID) 250 MG tablet Take 1 tablet by mouth Daily. 30 tablet 12    vitamin E 400 UNIT capsule Take 1 capsule by mouth Daily. 60 capsule 5    doxycycline (MONODOX) 100 MG capsule Take 1 capsule by mouth 2 (Two) Times a Day. (Patient not taking: Reported on 1/8/2024) 20 capsule 0    PARoxetine (PAXIL) 10 MG tablet Take 1 tablet by mouth Every Morning. (Patient not taking: Reported on 1/8/2024) 30 tablet 12     No current facility-administered medications on file prior to visit.       Allergies   Allergen Reactions    Penicillins Hives       Review of Systems   Neurological:  Negative for dizziness, tremors, syncope, facial asymmetry, weakness, numbness, headache, memory problem and confusion.       Objective   Visit Vitals  /89 (BP Location: Left arm, Patient Position: Sitting, Cuff Size: Adult)   Pulse 77   Temp 97.7 °F (36.5 °C)   Resp 14   Ht 179.1 cm (70.5\")   Wt 94.7 kg (208 lb 12.8 oz)   SpO2 99%   BMI 29.54 kg/m²     Physical Exam  Vitals and nursing note reviewed. "   Constitutional:       Appearance: He is well-developed.   HENT:      Head: Normocephalic.   Neck:      Thyroid: No thyromegaly.      Vascular: No carotid bruit.      Trachea: Trachea normal.   Cardiovascular:      Rate and Rhythm: Normal rate and regular rhythm.      Heart sounds: No murmur heard.     No friction rub. No gallop.   Pulmonary:      Effort: Pulmonary effort is normal. No respiratory distress.      Breath sounds: Normal breath sounds. No wheezing.   Chest:      Chest wall: No tenderness.   Musculoskeletal:      Cervical back: Neck supple.   Skin:     General: Skin is dry.      Findings: No rash.      Nails: There is no clubbing.   Neurological:      General: No focal deficit present.      Mental Status: He is alert and oriented to person, place, and time.      Cranial Nerves: Cranial nerves 2-12 are intact.   Psychiatric:         Behavior: Behavior is cooperative.           Assessment & Plan .  Problem List Items Addressed This Visit          High    Seizures    Overview     Last seizure was 10-1-2023  Followed with Dr. Seiple  EEG done 10-19-23,         Current Assessment & Plan     Possibly a factor for memory loss.          Subdural hematoma    Current Assessment & Plan     Possibly a factor for memory loss.             Unprioritized    Depression    Current Assessment & Plan     Possibly a factor for memory loss.          Relevant Medications    donepezil (Aricept) 5 MG tablet    Memory loss - Primary    Current Assessment & Plan     Worsening; Possibly due to subdural hematoma, anxiety or seizures. Patient scored 25 out of 30 today. He was 26 out of 30 in 2022. Long discussion with family.  Will schedule a MRI of the brain with contrast at [facility]. Will start Aricept 5 mg daily.  Follow up after MRI has been completed.   Patient tolerated aricept well without side effects. I feel the benefits of the medication outweigh the risks.           Relevant Orders    MRI Brain With & Without Contrast     Mild early onset Alzheimer's dementia    Current Assessment & Plan     Advised to start Aricept.  --Patient tolerated aricept well without side effects. I feel the benefits of the medication outweigh the risks.           Relevant Medications    donepezil (Aricept) 5 MG tablet    Other Relevant Orders    MRI Brain With & Without Contrast

## 2024-01-09 ENCOUNTER — TELEPHONE (OUTPATIENT)
Dept: FAMILY MEDICINE CLINIC | Facility: CLINIC | Age: 64
End: 2024-01-09
Payer: COMMERCIAL

## 2024-01-09 NOTE — ASSESSMENT & PLAN NOTE
Worsening; Possibly due to subdural hematoma, anxiety or seizures. Patient scored 25 out of 30 today. He was 26 out of 30 in 2022. Long discussion with family.  Will schedule a MRI of the brain with contrast at [facility]. Will start Aricept 5 mg daily.  Follow up after MRI has been completed.   Patient tolerated aricept well without side effects. I feel the benefits of the medication outweigh the risks.

## 2024-01-09 NOTE — TELEPHONE ENCOUNTER
Called and notified patient that [facility]  said he could have the MRI if the metal in body is titanium

## 2024-01-10 NOTE — PROGRESS NOTES
Medical Examination      Subjective     Mook Bose is a 63 y.o. male who presents today for a  fitness determination physical exam. The patient reports no problems.  The following portions of the patient's history were reviewed and updated as appropriate: allergies, current medications, past family history, past medical history, past social history, past surgical history, and problem list.    I personally reviewed and updated the patient's allergies, medications, problem list, and past medical, surgical, social, and family history. I have reviewed and confirmed the accuracy of the History of Present Illness and Review of Symptoms as documented by the MA/BRENDANN/RN. Lonny Martinez MD        Family History   Problem Relation Age of Onset    Alzheimer's disease Mother 62    Cancer Father         Unknown    Alzheimer's disease Sister 70    Heart disease Brother         dx at 59 living at 75    No Known Problems Daughter     No Known Problems Son     Heart disease Maternal Grandfather         MI at 44,  at 78    Stroke Maternal Grandfather     Hypertension Brother     Cancer Brother         dx at 49, living at 66    Other Brother         HEP. C       Social History     Tobacco Use    Smoking status: Former     Packs/day: 1.00     Years: 26.00     Additional pack years: 0.00     Total pack years: 26.00     Types: Cigars, Cigarettes     Start date: 1974     Quit date:      Years since quittin.0     Passive exposure: Past    Smokeless tobacco: Never   Vaping Use    Vaping Use: Never used   Substance Use Topics    Alcohol use: No    Drug use: No       Past Surgical History:   Procedure Laterality Date    CRANIOTOMY  2009    VASECTOMY  1992       Patient Active Problem List   Diagnosis    Seizures    Memory loss    Closed nondisplaced fracture of sixth cervical vertebra    Subdural hematoma    Class 1 obesity due to excess calories with serious comorbidity and  body mass index (BMI) of 30.0 to 30.9 in adult    History of tobacco use    Hearing loss    Lethargy    Screening PSA (prostate specific antigen)    Hyperlipidemia    Encounter for general adult medical examination with abnormal findings    Deviated septum    Carpal tunnel syndrome of right wrist    Cervical disc disease    Hyperplastic colon polyp    Chronic bilateral low back pain without sciatica    Ganglion cyst    Family history of coronary artery disease    Family history of diabetes mellitus    Obstructive sleep apnea syndrome    Depression    Adenomatous colon polyp    Chest pain    Costochondritis    Pleurisy    Bronchitis    Mild early onset Alzheimer's dementia         Current Outpatient Medications:     aspirin 81 MG tablet, Take 1 tablet by mouth Daily., Disp: , Rfl:     donepezil (Aricept) 5 MG tablet, Take 1 tablet by mouth Every Night., Disp: 90 tablet, Rfl: 1    levETIRAcetam (KEPPRA) 750 MG tablet, Take 1 tablet by mouth 2 (Two) Times a Day., Disp: 60 tablet, Rfl: 5    meloxicam (Mobic) 15 MG tablet, Take 1 tablet by mouth Daily As Needed for Mild Pain., Disp: 30 tablet, Rfl: 5    Omega-3 Fatty Acids (fish oil) 1000 MG capsule capsule, Take 1 capsule by mouth Daily With Breakfast., Disp: 90 capsule, Rfl: 12    vitamin C (ASCORBIC ACID) 250 MG tablet, Take 1 tablet by mouth Daily., Disp: 30 tablet, Rfl: 12    vitamin E 400 UNIT capsule, Take 1 capsule by mouth Daily., Disp: 60 capsule, Rfl: 5    doxycycline (MONODOX) 100 MG capsule, Take 1 capsule by mouth 2 (Two) Times a Day. (Patient not taking: Reported on 1/8/2024), Disp: 20 capsule, Rfl: 0    PARoxetine (PAXIL) 10 MG tablet, Take 1 tablet by mouth Every Morning. (Patient not taking: Reported on 1/8/2024), Disp: 30 tablet, Rfl: 12         Review of Systems   Constitutional:  Negative for chills and diaphoresis.   HENT:  Negative for trouble swallowing and voice change.    Eyes:  Negative for visual disturbance.   Respiratory:  Negative for  "shortness of breath.    Cardiovascular:  Negative for chest pain and palpitations.   Gastrointestinal:  Negative for abdominal pain and nausea.   Endocrine: Negative for polydipsia and polyphagia.   Genitourinary:  Negative for hematuria.   Musculoskeletal:  Negative for neck stiffness.   Skin:  Negative for color change and pallor.   Allergic/Immunologic: Negative for immunocompromised state.   Neurological:  Negative for seizures and syncope.   Hematological:  Negative for adenopathy.   Psychiatric/Behavioral:  Negative for hallucinations, sleep disturbance and suicidal ideas.        I have reviewed and confirmed the accuracy of the ROS as documented by the MA/LPN/RN Lonny Martinez MD      Objective   /76 (BP Location: Right arm, Patient Position: Sitting, Cuff Size: Adult)   Pulse 80   Temp 98.6 °F (37 °C) (Temporal)   Resp 16   Ht 179.1 cm (70.51\")   Wt 94.9 kg (209 lb 3.2 oz)   SpO2 96%   BMI 29.58 kg/m²   Wt Readings from Last 3 Encounters:   01/12/24 94.9 kg (209 lb 3.2 oz)   01/08/24 94.7 kg (208 lb 12.8 oz)   11/09/23 97.1 kg (214 lb)       Vision:   Uncorrected Corrected Horizontal Field of Vision   Right Eye  20/20 >85 degrees   Left Eye   20/20 >85 degrees   Both Eyes   20/20      Applicant can recognize and distinguish among traffic control signals and devices showing standard red, green, and talya colors.    Applicant meets visual acuity requirement only when wearing corrective lenses.    Monocular Vision?: No    Physical Exam  Constitutional:       Appearance: He is well-developed. He is not diaphoretic.   HENT:      Head: Normocephalic.      Right Ear: Hearing, tympanic membrane, ear canal and external ear normal.      Left Ear: Hearing, tympanic membrane, ear canal and external ear normal.      Nose: Nose normal. No mucosal edema or congestion.      Right Sinus: No maxillary sinus tenderness or frontal sinus tenderness.      Left Sinus: No maxillary sinus tenderness or frontal sinus " tenderness.      Mouth/Throat:      Mouth: No oral lesions.      Pharynx: Uvula midline. No oropharyngeal exudate or posterior oropharyngeal erythema.      Tonsils: No tonsillar exudate.   Eyes:      General: Lids are normal. No scleral icterus.        Right eye: No foreign body or discharge.         Left eye: No foreign body or discharge.      Extraocular Movements:      Right eye: No nystagmus.      Left eye: No nystagmus.      Conjunctiva/sclera: Conjunctivae normal.      Right eye: Right conjunctiva is not injected. No exudate or hemorrhage.     Left eye: Left conjunctiva is not injected. No exudate or hemorrhage.     Pupils: Pupils are equal, round, and reactive to light.      Funduscopic exam:     Right eye: No hemorrhage, exudate, AV nicking, arteriolar narrowing or papilledema.         Left eye: No hemorrhage, exudate, AV nicking, arteriolar narrowing or papilledema.   Neck:      Thyroid: No thyromegaly.      Vascular: No carotid bruit or JVD.      Trachea: No tracheal deviation.   Cardiovascular:      Rate and Rhythm: Normal rate and regular rhythm.      Heart sounds: Normal heart sounds. No murmur heard.     No friction rub. No gallop.   Pulmonary:      Effort: Pulmonary effort is normal.      Breath sounds: Normal breath sounds. No stridor. No decreased breath sounds, wheezing or rales.   Abdominal:      General: Bowel sounds are normal. There is no distension.      Palpations: Abdomen is soft. There is no mass.      Tenderness: There is no abdominal tenderness. There is no guarding or rebound.      Hernia: No hernia is present. There is no hernia in the left inguinal area.   Genitourinary:     Penis: Normal.       Testes: Normal.         Right: Mass, tenderness or swelling not present.         Left: Mass, tenderness or swelling not present.   Lymphadenopathy:      Head:      Right side of head: No submental, submandibular, tonsillar, preauricular, posterior auricular or occipital adenopathy.      Left  side of head: No submental, submandibular, tonsillar, preauricular, posterior auricular or occipital adenopathy.      Cervical: No cervical adenopathy.      Lower Body: No right inguinal adenopathy. No left inguinal adenopathy.   Skin:     General: Skin is warm and dry.      Coloration: Skin is not pale.   Neurological:      Mental Status: He is alert and oriented to person, place, and time.      Cranial Nerves: No cranial nerve deficit.      Sensory: No sensory deficit.      Coordination: Coordination normal.      Gait: Gait normal.      Deep Tendon Reflexes: Reflexes are normal and symmetric.           Assessment & Plan      Medications        Problem List         LOS    CDL.  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  Remote history of traumatic brain injury with seizure disorder, no seizures since 2013, has been stable on Keppra, has been cleared to drive by neurology/will get updated letter from neurology.   TAMMY.  Good control on cpap, no fatigue, no impact on driving.  He agrees to bring letter from neurology.        Diagnoses and all orders for this visit:    1. 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 Class 1 obesity due to excess calories with serious comorbidity and body mass index (BMI) of 30.0 to 30.9 in adult    3. History of tobacco use      BMI is >= 25 and <30. (Overweight) The following options were offered after discussion;: exercise counseling/recommendations and nutrition counseling/recommendations          Expected course, medications, and adverse effects discussed.  Call or return if worsening or persistent symptoms.  I wore protective equipment throughout this patient encounter including a mask, gloves, and eye protection.  Hand hygiene was performed before donning protective equipment and after removal when leaving the room.  The complete contents of the assessment and plan and lab results as documented above have been reviewed  and addressed by myself with the patient today as part of an ongoing evaluation / treatment plan.  If some of the documentation has been copied from a previous note and is unchanged it indicates that this problem / plan has been assessed today but is stable from a previous visit and no changes have been recommended.

## 2024-01-11 NOTE — ASSESSMENT & PLAN NOTE
Advised to start Aricept.  --Patient tolerated aricept well without side effects. I feel the benefits of the medication outweigh the risks.

## 2024-01-12 ENCOUNTER — TELEPHONE (OUTPATIENT)
Dept: NEUROLOGY | Facility: CLINIC | Age: 64
End: 2024-01-12
Payer: COMMERCIAL

## 2024-01-12 ENCOUNTER — CLINICAL SUPPORT (OUTPATIENT)
Dept: FAMILY MEDICINE CLINIC | Facility: CLINIC | Age: 64
End: 2024-01-12

## 2024-01-12 VITALS
HEIGHT: 71 IN | TEMPERATURE: 98.6 F | WEIGHT: 209.2 LBS | DIASTOLIC BLOOD PRESSURE: 76 MMHG | OXYGEN SATURATION: 96 % | SYSTOLIC BLOOD PRESSURE: 136 MMHG | HEART RATE: 80 BPM | RESPIRATION RATE: 16 BRPM | BODY MASS INDEX: 29.29 KG/M2

## 2024-01-12 DIAGNOSIS — Z87.891 HISTORY OF TOBACCO USE: ICD-10-CM

## 2024-01-12 DIAGNOSIS — Z02.4 ENCOUNTER FOR CDL (COMMERCIAL DRIVING LICENSE) EXAM: Primary | ICD-10-CM

## 2024-01-12 DIAGNOSIS — E66.09 CLASS 1 OBESITY DUE TO EXCESS CALORIES WITH SERIOUS COMORBIDITY AND BODY MASS INDEX (BMI) OF 30.0 TO 30.9 IN ADULT: ICD-10-CM

## 2024-01-12 LAB
BILIRUB BLD-MCNC: NEGATIVE MG/DL
CLARITY, POC: CLEAR
COLOR UR: YELLOW
GLUCOSE UR STRIP-MCNC: NEGATIVE MG/DL
KETONES UR QL: NEGATIVE
LEUKOCYTE EST, POC: NEGATIVE
NITRITE UR-MCNC: NEGATIVE MG/ML
PH UR: 6 [PH] (ref 5–8)
PROT UR STRIP-MCNC: NEGATIVE MG/DL
RBC # UR STRIP: NEGATIVE /UL
SP GR UR: 1 (ref 1–1.03)
UROBILINOGEN UR QL: NORMAL

## 2024-01-12 NOTE — TELEPHONE ENCOUNTER
There is a form under Media but not a letter.  He will have to bring the paperwork to the office we do not have forms here. HUB ok to relay the message

## 2024-01-12 NOTE — TELEPHONE ENCOUNTER
Caller: Mook Bose    Relationship: Self    Best call back number: [phone]     What is the best time to reach you: ANY    Who are you requesting to speak with (clinical staff, provider,  specific staff member): PROVIDER    What was the call regarding: PATIENT TELEPHONED TO REQUEST CLEARANCE LETTER TO DRIVE/CDL    PATIENT ADVISES PROVIDER WROTE SIMILAR LETTER LAST YEAR.    PATIENT WILL  FROM THE OFFICE WHEN READY.     PLEASE WRITE OR CALL TO ADVISE-THANK YOU

## 2024-01-15 NOTE — TELEPHONE ENCOUNTER
Caller: Mook Bose    Relationship: Self    Best call back number: [phone]    What was the call regarding: PT CALLED BACK. I ADVISED HE WOULD NEED TO HAVE THE CDL PPW FAXED TO US TO HAVE DR. SEIPEL COMPLETE. PT TO CONTACT THE DEPT OF TRANSPORTATION AND ASK THAT THEY FAX A COPY OF THE PPW TO BE COMPLETED BY DR. SEIPEL.    Do you require a callback: IF NEEDED.    PLEASE REVIEW AND ADVISE.

## 2024-01-16 NOTE — TELEPHONE ENCOUNTER
Pt needs letter from provider stating he is seizure free since 2013 and stable on Keppra 750 mg 1BID. Please let me know when done.

## 2024-01-16 NOTE — TELEPHONE ENCOUNTER
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  He will bring it in, advised will put them on Dr. Seipels desk for him to review

## 2024-03-06 ENCOUNTER — OFFICE VISIT (OUTPATIENT)
Dept: FAMILY MEDICINE CLINIC | Facility: CLINIC | Age: 64
End: 2024-03-06
Payer: MEDICARE

## 2024-03-06 VITALS
DIASTOLIC BLOOD PRESSURE: 84 MMHG | BODY MASS INDEX: 29.15 KG/M2 | OXYGEN SATURATION: 99 % | SYSTOLIC BLOOD PRESSURE: 125 MMHG | WEIGHT: 203.6 LBS | TEMPERATURE: 97.7 F | HEART RATE: 80 BPM | RESPIRATION RATE: 14 BRPM | HEIGHT: 70 IN

## 2024-03-06 DIAGNOSIS — E78.2 MIXED HYPERLIPIDEMIA: ICD-10-CM

## 2024-03-06 DIAGNOSIS — R00.1 BRADYCARDIA: Primary | ICD-10-CM

## 2024-03-06 DIAGNOSIS — R53.83 LETHARGY: ICD-10-CM

## 2024-03-06 DIAGNOSIS — G30.0 MILD EARLY ONSET ALZHEIMER'S DEMENTIA WITH ANXIETY: ICD-10-CM

## 2024-03-06 DIAGNOSIS — R35.1 NOCTURIA: ICD-10-CM

## 2024-03-06 DIAGNOSIS — F02.A4 MILD EARLY ONSET ALZHEIMER'S DEMENTIA WITH ANXIETY: ICD-10-CM

## 2024-03-06 DIAGNOSIS — R61 DIAPHORESIS: ICD-10-CM

## 2024-03-06 DIAGNOSIS — Z12.5 SCREENING PSA (PROSTATE SPECIFIC ANTIGEN): ICD-10-CM

## 2024-03-06 PROBLEM — J40 BRONCHITIS: Status: RESOLVED | Noted: 2023-11-02 | Resolved: 2024-03-06

## 2024-03-06 PROCEDURE — 93000 ELECTROCARDIOGRAM COMPLETE: CPT | Performed by: FAMILY MEDICINE

## 2024-03-06 PROCEDURE — 1160F RVW MEDS BY RX/DR IN RCRD: CPT | Performed by: FAMILY MEDICINE

## 2024-03-06 PROCEDURE — 1159F MED LIST DOCD IN RCRD: CPT | Performed by: FAMILY MEDICINE

## 2024-03-06 PROCEDURE — 99214 OFFICE O/P EST MOD 30 MIN: CPT | Performed by: FAMILY MEDICINE

## 2024-03-06 PROCEDURE — 99497 ADVNCD CARE PLAN 30 MIN: CPT | Performed by: FAMILY MEDICINE

## 2024-03-06 NOTE — ASSESSMENT & PLAN NOTE
=Doing well;  EKG done today and read by myself shows normal sinus rhythm without abnormalities-prior liset and left atrial enlargement-resolved from 11-2-23

## 2024-03-06 NOTE — PROGRESS NOTES
Subjective   Mook Bose is a 63 y.o. male.     History of Present Illness    Bradycardia      Night Sweats  This is a new problem. The current episode started more than 1 month ago. The problem has been gradually worsening. Pertinent negatives include no chest pain, fatigue or joint swelling.        The following portions of the patient's history were reviewed and updated as appropriate: allergies, current medications, past family history, past medical history, past social history, past surgical history, and problem list.    Family History   Problem Relation Age of Onset    Alzheimer's disease Mother 62    Cancer Father         Unknown    Alzheimer's disease Sister 70    Heart disease Brother         dx at 59 living at 75    No Known Problems Daughter     No Known Problems Son     Heart disease Maternal Grandfather         MI at 44,  at 78    Stroke Maternal Grandfather     Hypertension Brother     Cancer Brother         dx at 49, living at 66    Other Brother         HEP. C       Social History     Tobacco Use    Smoking status: Former     Current packs/day: 0.00     Average packs/day: 1 pack/day for 26.0 years (26.0 ttl pk-yrs)     Types: Cigars, Cigarettes     Start date: 1974     Quit date:      Years since quittin.2     Passive exposure: Past    Smokeless tobacco: Never   Vaping Use    Vaping status: Never Used   Substance Use Topics    Alcohol use: No    Drug use: No       Past Surgical History:   Procedure Laterality Date    CRANIOTOMY  2009    VASECTOMY         Patient Active Problem List   Diagnosis    Seizures    Memory loss    Closed nondisplaced fracture of sixth cervical vertebra    Subdural hematoma    Class 1 obesity due to excess calories with serious comorbidity and body mass index (BMI) of 30.0 to 30.9 in adult    History of tobacco use    Hearing loss    Lethargy    Screening PSA (prostate specific antigen)    Hyperlipidemia    Encounter for general adult  "medical examination with abnormal findings    Deviated septum    Carpal tunnel syndrome of right wrist    Cervical disc disease    Hyperplastic colon polyp    Chronic bilateral low back pain without sciatica    Ganglion cyst    Family history of coronary artery disease    Family history of diabetes mellitus    Obstructive sleep apnea syndrome    Depression    Adenomatous colon polyp    Chest pain    Costochondritis    Pleurisy    Mild early onset Alzheimer's dementia    Nocturia    Bradycardia    Diaphoresis       Current Outpatient Medications on File Prior to Visit   Medication Sig Dispense Refill    aspirin 81 MG tablet Take 1 tablet by mouth Daily.      donepezil (Aricept) 5 MG tablet Take 1 tablet by mouth Every Night. 90 tablet 1    levETIRAcetam (KEPPRA) 750 MG tablet Take 1 tablet by mouth 2 (Two) Times a Day. 60 tablet 5    Omega-3 Fatty Acids (fish oil) 1000 MG capsule capsule Take 1 capsule by mouth Daily With Breakfast. 90 capsule 12    vitamin C (ASCORBIC ACID) 250 MG tablet Take 1 tablet by mouth Daily. 30 tablet 12    vitamin E 400 UNIT capsule Take 1 capsule by mouth Daily. 60 capsule 5     No current facility-administered medications on file prior to visit.       Allergies   Allergen Reactions    Penicillins Hives       Review of Systems   Constitutional:  Positive for night sweats. Negative for fatigue.   HENT:  Negative for hearing loss.    Respiratory:  Negative for shortness of breath.    Cardiovascular:  Negative for chest pain and palpitations.   Genitourinary:  Negative for frequency.        Nocturia   Musculoskeletal:  Negative for joint swelling.   Neurological:  Negative for memory problem.       Objective   Visit Vitals  /84 (BP Location: Left arm, Patient Position: Sitting, Cuff Size: Adult)   Pulse 80   Temp 97.7 °F (36.5 °C)   Resp 14   Ht 177.8 cm (70\")   Wt 92.4 kg (203 lb 9.6 oz)   SpO2 99%   BMI 29.21 kg/m²     Physical Exam  Vitals and nursing note reviewed. "   Constitutional:       Appearance: He is well-developed.   HENT:      Head: Normocephalic.   Neck:      Thyroid: No thyromegaly.      Vascular: No carotid bruit.      Trachea: Trachea normal.   Cardiovascular:      Rate and Rhythm: Normal rate and regular rhythm.      Heart sounds: No murmur heard.     No friction rub. No gallop.   Pulmonary:      Effort: Pulmonary effort is normal. No respiratory distress.      Breath sounds: Normal breath sounds. No wheezing.   Chest:      Chest wall: No tenderness.   Musculoskeletal:      Cervical back: Neck supple.   Skin:     General: Skin is dry.      Findings: No rash.      Nails: There is no clubbing.   Neurological:      Mental Status: He is alert and oriented to person, place, and time.   Psychiatric:         Behavior: Behavior is cooperative.           Assessment & Plan .  Problem List Items Addressed This Visit          High    Mild early onset Alzheimer's dementia    Current Assessment & Plan     Stable but he is frustrated he is not improved on 5 mg of Aricept- Discussed increasing Aricept to 10 mg-patient will start taking 2 of 5mg tablets daily and prescribed him milligram tablets.  Patient tolerated aricept well without side effects. I feel the benefits of the medication outweigh the risks.              Medium    Hyperlipidemia    Overview     He is not on a statin but needs a calcium score         Current Assessment & Plan      Will order labs today and patient will return for results and shared decision making.           Relevant Orders    Comprehensive Metabolic Panel (Completed)    Lipid Panel With / Chol / HDL Ratio (Completed)       Unprioritized    Bradycardia - Primary    Current Assessment & Plan     =Doing well;  EKG done today and read by myself shows normal sinus rhythm without abnormalities-prior liset and left atrial enlargement-resolved from 11-2-23         Relevant Orders    ECG 12 Lead    Diaphoresis    Current Assessment & Plan     New diagnosis-  Offered to test for tuberculosis- patient declines -- Will follow conservatively         Lethargy    Current Assessment & Plan     Mild- Will order labs today and patient will return for results and shared decision making.           Relevant Orders    CBC & Differential (Completed)    TSH (Completed)    Nocturia    Current Assessment & Plan     Will order labs today and patient will return for results and shared decision making.           Relevant Orders    Urinalysis without microscopic (no culture) - Urine, Clean Catch (Completed)    Screening PSA (prostate specific antigen)    Current Assessment & Plan     Will order labs today and patient will return for results and shared decision making.           Relevant Orders    PSA Screen (Completed)

## 2024-03-06 NOTE — ASSESSMENT & PLAN NOTE
Stable but he is frustrated he is not improved on 5 mg of Aricept- Discussed increasing Aricept to 10 mg-patient will start taking 2 of 5mg tablets daily and prescribed him milligram tablets.  Patient tolerated aricept well without side effects. I feel the benefits of the medication outweigh the risks.

## 2024-03-07 LAB
ALBUMIN SERPL-MCNC: 4.2 G/DL (ref 3.9–4.9)
ALBUMIN/GLOB SERPL: 1.6 {RATIO} (ref 1.2–2.2)
ALP SERPL-CCNC: 62 IU/L (ref 44–121)
ALT SERPL-CCNC: 15 IU/L (ref 0–44)
APPEARANCE UR: CLEAR
AST SERPL-CCNC: 11 IU/L (ref 0–40)
BASOPHILS # BLD AUTO: 0 X10E3/UL (ref 0–0.2)
BASOPHILS NFR BLD AUTO: 1 %
BILIRUB SERPL-MCNC: 0.4 MG/DL (ref 0–1.2)
BILIRUB UR QL STRIP: NEGATIVE
BUN SERPL-MCNC: 16 MG/DL (ref 8–27)
BUN/CREAT SERPL: 20 (ref 10–24)
CALCIUM SERPL-MCNC: 9.5 MG/DL (ref 8.6–10.2)
CHLORIDE SERPL-SCNC: 102 MMOL/L (ref 96–106)
CHOLEST SERPL-MCNC: 160 MG/DL (ref 100–199)
CHOLEST/HDLC SERPL: 4.6 RATIO (ref 0–5)
CO2 SERPL-SCNC: 27 MMOL/L (ref 20–29)
COLOR UR: YELLOW
CREAT SERPL-MCNC: 0.82 MG/DL (ref 0.76–1.27)
EGFRCR SERPLBLD CKD-EPI 2021: 98 ML/MIN/1.73
EOSINOPHIL # BLD AUTO: 0.1 X10E3/UL (ref 0–0.4)
EOSINOPHIL NFR BLD AUTO: 1 %
ERYTHROCYTE [DISTWIDTH] IN BLOOD BY AUTOMATED COUNT: 13.7 % (ref 11.6–15.4)
GLOBULIN SER CALC-MCNC: 2.6 G/DL (ref 1.5–4.5)
GLUCOSE SERPL-MCNC: 82 MG/DL (ref 70–99)
GLUCOSE UR QL STRIP: NEGATIVE
HCT VFR BLD AUTO: 41.8 % (ref 37.5–51)
HDLC SERPL-MCNC: 35 MG/DL
HGB BLD-MCNC: 13.8 G/DL (ref 13–17.7)
HGB UR QL STRIP: NEGATIVE
IMM GRANULOCYTES # BLD AUTO: 0 X10E3/UL (ref 0–0.1)
IMM GRANULOCYTES NFR BLD AUTO: 0 %
KETONES UR QL STRIP: ABNORMAL
LDLC SERPL CALC-MCNC: 109 MG/DL (ref 0–99)
LEUKOCYTE ESTERASE UR QL STRIP: NEGATIVE
LYMPHOCYTES # BLD AUTO: 1.5 X10E3/UL (ref 0.7–3.1)
LYMPHOCYTES NFR BLD AUTO: 21 %
MCH RBC QN AUTO: 29 PG (ref 26.6–33)
MCHC RBC AUTO-ENTMCNC: 33 G/DL (ref 31.5–35.7)
MCV RBC AUTO: 88 FL (ref 79–97)
MONOCYTES # BLD AUTO: 0.5 X10E3/UL (ref 0.1–0.9)
MONOCYTES NFR BLD AUTO: 7 %
NEUTROPHILS # BLD AUTO: 5.1 X10E3/UL (ref 1.4–7)
NEUTROPHILS NFR BLD AUTO: 70 %
NITRITE UR QL STRIP: NEGATIVE
PH UR STRIP: 6.5 [PH] (ref 5–7.5)
PLATELET # BLD AUTO: 385 X10E3/UL (ref 150–450)
POTASSIUM SERPL-SCNC: 4.2 MMOL/L (ref 3.5–5.2)
PROT SERPL-MCNC: 6.8 G/DL (ref 6–8.5)
PROT UR QL STRIP: ABNORMAL
PSA SERPL-MCNC: 2.1 NG/ML (ref 0–4)
RBC # BLD AUTO: 4.76 X10E6/UL (ref 4.14–5.8)
SODIUM SERPL-SCNC: 141 MMOL/L (ref 134–144)
SP GR UR STRIP: 1.03 (ref 1–1.03)
TRIGL SERPL-MCNC: 82 MG/DL (ref 0–149)
TSH SERPL DL<=0.005 MIU/L-ACNC: 1.8 UIU/ML (ref 0.45–4.5)
UROBILINOGEN UR STRIP-MCNC: 1 MG/DL (ref 0.2–1)
VLDLC SERPL CALC-MCNC: 16 MG/DL (ref 5–40)
WBC # BLD AUTO: 7.2 X10E3/UL (ref 3.4–10.8)

## 2024-03-07 NOTE — PROGRESS NOTES
Subjective   Mook Bose is a 64 y.o. male here for his annual physical with me. Mook is here for coordination of medical care, to discuss health maintenance, disease prevention as well as to followup on medical problems. Patient has been followed by me since 1988. Patient's last CPE was 2-28-23. Activity level is heavy. Exercises 0 per week. Appetite is fair. Feels fairly well with many complaints. Energy level is good. Sleeps fairly well. Patient's last colonoscopy was 7-19-23 with Dr. Danielson. He is advised to repeat in 3 years. Patient is doing routine self skin exam monthly. Patient is doing routine self-breast exams monthly. Patient is checking testicles monthly.    Lab Results   Component Value Date    PSA 2.1 03/06/2024        Hyperlipidemia  This is a chronic problem. The current episode started more than 1 year ago. The problem is controlled. Recent lipid tests were reviewed and are high. Pertinent negatives include no chest pain, myalgias or shortness of breath.   Seizures   This is a chronic problem. The current episode started more than 1 week ago. The problem has not changed since onset.Pertinent negatives include no speech difficulty, no visual disturbance, no sore throat, no chest pain, no cough, no nausea, no vomiting and no diarrhea.   Memory Loss  This is a chronic problem. The current episode started more than 1 year ago. The problem occurs constantly. The problem has been gradually improving. Associated symptoms include abdominal pain and neck pain. Pertinent negatives include no chest pain, chills, congestion, coughing, fatigue, fever, joint swelling, myalgias, nausea, rash, sore throat, vomiting or weakness.   Abdominal Pain  This is a new problem. The current episode started 1 to 4 weeks ago. The problem occurs intermittently. The problem has been unchanged. Pertinent negatives include no constipation, diarrhea, dysuria, fever, frequency, hematuria, myalgias, nausea, vomiting or weight  loss.   Neck Pain   Pertinent negatives include no chest pain, fever, weakness or weight loss.        The following portions of the patient's history were reviewed and updated as appropriate: allergies, current medications, past family history, past medical history, past social history, past surgical history, and problem list.    Past Medical History:   Diagnosis Date    Hyperlipidemia     Seizures        Family History   Problem Relation Age of Onset    Alzheimer's disease Mother 62    Cancer Father         Unknown    Alzheimer's disease Sister 70    Heart disease Brother         dx at 59 living at 75    No Known Problems Daughter     No Known Problems Son     Heart disease Maternal Grandfather         MI at 44,  at 78    Stroke Maternal Grandfather     Hypertension Brother     Cancer Brother         dx at 49, living at 66    Other Brother         HEP. C       Social History     Socioeconomic History    Marital status:    Tobacco Use    Smoking status: Former     Current packs/day: 0.00     Average packs/day: 1 pack/day for 26.0 years (26.0 ttl pk-yrs)     Types: Cigars, Cigarettes     Start date: 1974     Quit date:      Years since quittin.2     Passive exposure: Past    Smokeless tobacco: Never   Vaping Use    Vaping status: Never Used   Substance and Sexual Activity    Alcohol use: No    Drug use: No    Sexual activity: Defer       Social History     Social History Narrative     1 x 1981,  after 37 years.  2 children together.  Lives with  since , just the 2 of them at home.  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  Caffeine 3 cups coffee daily, 2 tea or soda daily.  Exercise none other than at work.  Hobbies Fle[employer] markets and shopping.  Very seldom wears seatbelt.        Past Surgical History:   Procedure Laterality Date    CRANIOTOMY  2009    VASECTOMY  1992       Current Outpatient  Medications on File Prior to Visit   Medication Sig Dispense Refill    aspirin 81 MG tablet Take 1 tablet by mouth Daily.       No current facility-administered medications on file prior to visit.       Allergies   Allergen Reactions    Penicillins Hives       Review of Systems   Constitutional:  Negative for appetite change, chills, fatigue, fever, unexpected weight gain and unexpected weight loss.   HENT:  Negative for congestion, dental problem, ear discharge, ear pain, hearing loss, nosebleeds, postnasal drip, rhinorrhea, sinus pressure, sneezing, sore throat, tinnitus and voice change.         Last dental exam approx. 2017-advised to follow   Eyes:  Negative for blurred vision, double vision, pain, redness and visual disturbance.        Last vision exam 9-2020 with Dr. Barrera-advised to follow   Respiratory:  Negative for cough, shortness of breath, wheezing and stridor.    Cardiovascular:  Negative for chest pain, palpitations and leg swelling.   Gastrointestinal:  Positive for abdominal pain. Negative for anal bleeding, blood in stool, constipation, diarrhea, nausea, rectal pain, vomiting, GERD and indigestion.        7 BM weekly   Endocrine: Negative for cold intolerance, heat intolerance, polydipsia, polyphagia and polyuria.        Sex Drive  He is a 5  She is a 5   Genitourinary:  Negative for difficulty urinating, dysuria, frequency, hematuria and urgency.   Musculoskeletal:  Positive for neck pain. Negative for back pain, joint swelling, myalgias and neck stiffness.   Skin:  Negative for color change, dry skin and rash.   Neurological:  Positive for seizures and memory problem. Negative for dizziness, syncope, speech difficulty, weakness, light-headedness and headache.   Hematological:  Does not bruise/bleed easily.   Psychiatric/Behavioral:  Negative for decreased concentration, sleep disturbance, depressed mood and stress. The patient is not nervous/anxious.        Objective   Visit Vitals  /84  "(BP Location: Left arm, Patient Position: Sitting, Cuff Size: Adult)   Pulse 104   Temp 98 °F (36.7 °C) (Temporal)   Resp 18   Ht 177.8 cm (70\")   Wt 92.4 kg (203 lb 9.6 oz)   SpO2 99%   BMI 29.21 kg/m²     Physical Exam  Constitutional:       General: He is not in acute distress.     Appearance: He is well-developed. He is not diaphoretic.   HENT:      Head: Normocephalic.      Right Ear: Hearing, tympanic membrane, ear canal and external ear normal.      Left Ear: Hearing, tympanic membrane, ear canal and external ear normal.      Nose: Nose normal.      Mouth/Throat:      Lips: Pink.      Mouth: Mucous membranes are moist.      Pharynx: Oropharynx is clear. No oropharyngeal exudate.   Eyes:      General: Lids are normal. No scleral icterus.        Right eye: No discharge.         Left eye: No discharge.      Extraocular Movements: Extraocular movements intact.      Conjunctiva/sclera: Conjunctivae normal.      Pupils: Pupils are equal, round, and reactive to light.   Neck:      Trachea: Trachea normal.   Cardiovascular:      Rate and Rhythm: Normal rate and regular rhythm.      Pulses:           Dorsalis pedis pulses are 1+ on the right side and 1+ on the left side.        Posterior tibial pulses are 1+ on the right side and 1+ on the left side.      Heart sounds: Normal heart sounds. No murmur heard.  Pulmonary:      Effort: Pulmonary effort is normal.      Breath sounds: Normal breath sounds. No wheezing.   Chest:      Chest wall: No tenderness.   Breasts:     Right: Normal. No swelling, bleeding, inverted nipple, mass, nipple discharge, skin change or tenderness.      Left: Normal. No swelling, bleeding, inverted nipple, mass, nipple discharge, skin change or tenderness.   Abdominal:      General: Bowel sounds are normal. There is no distension.      Palpations: Abdomen is soft. There is no mass.      Tenderness: There is no abdominal tenderness.      Hernia: No hernia is present.   Genitourinary:     Penis: " Normal and circumcised. No tenderness.       Testes: Normal.      Prostate: Normal.      Rectum: Normal.   Musculoskeletal:         General: No tenderness or deformity. Normal range of motion.      Cervical back: Full passive range of motion without pain, normal range of motion and neck supple.   Lymphadenopathy:      Cervical: No cervical adenopathy.   Skin:     General: Skin is warm and dry.      Findings: No erythema or rash.      Comments: Hemangioma upper nose.  Varicosities nose.  Skin tags bilateral axillae.  Nevus scattered over the body.  Abrasion right hand.   Neurological:      General: No focal deficit present.      Mental Status: He is alert and oriented to person, place, and time.      Cranial Nerves: Cranial nerves 2-12 are intact. No cranial nerve deficit.      Sensory: Sensation is intact. No sensory deficit.      Motor: Motor function is intact. No abnormal muscle tone.      Coordination: Coordination is intact. Coordination normal.      Gait: Gait is intact.      Deep Tendon Reflexes: Reflexes normal.   Psychiatric:         Behavior: Behavior normal.         Thought Content: Thought content normal.         Judgment: Judgment normal.         Assessment & Plan   Problem List Items Addressed This Visit          High    Closed nondisplaced fracture of sixth cervical vertebra    Current Assessment & Plan     Doing well         Mild early onset Alzheimer's dementia    Current Assessment & Plan     Improved.  Patient tolerated Aricept 10 mg well without side effects. I feel the benefits of the medication outweigh the risks.          Relevant Medications    donepezil (Aricept) 5 MG tablet    Seizures    Overview     Last seizure was 10-1-2023  Followed with Dr. Seiple  EEG done 10-19-23,         Current Assessment & Plan     Doing well.  Patient tolerated Keppra well without side effects. I feel the benefits of the medication outweigh the risks.          Relevant Medications    levETIRAcetam (KEPPRA) 750  MG tablet    Subdural hematoma    Current Assessment & Plan     Doing well, possible causing some memory problems            Medium    Adenomatous colon polyp    Overview     Colonoscopy done 7-2023 with Dr. Danielson         Current Assessment & Plan     Advised repeat 7-2026         Hyperlipidemia - Primary    Overview     He is not on a statin but needs a calcium score         Current Assessment & Plan      Lipid and CMP done 3-6-2024, read by me, reviewed with pt.  Trig. 82 up from 60, Tot. Chol. 160 up from 155, HDL 35 down from 44,  up from 99  Worsening.   Encouraged to watch fatty intake, exercise more, and lose weight.   No medication other than Fish Oil  Is not getting adequate diet and exercise  Goals developed at last visit were not met   Follow up in  3 months  Care management needs are self-addressed. Self-management abilities addressed and patient is capable of managing his own disease.           Relevant Medications    Omega-3 Fatty Acids (fish oil) 1000 MG capsule capsule    Other Relevant Orders    Lipid Panel With / Chol / HDL Ratio    Comprehensive Metabolic Panel       Unprioritized    RESOLVED: Bradycardia    Current Assessment & Plan     Resolved x 3, thus delete.  EKG done 3-6-2024, results read by me, reviewed with pt.  EKG showed NSR with no abnormalities, prior bradycardia and Left atrial enlargement from 11-2-23-resolved         Carpal tunnel syndrome of right wrist    Current Assessment & Plan     Intermittent and moderate         Cervical disc disease    Current Assessment & Plan     Doing well         RESOLVED: Chest pain    Current Assessment & Plan     Resolved, thus delete.  EKG done 3-6-2024, results read by me, reviewed with pt.  EKG showed NSR with no abnormalities, prior bradycardia and Left atrial enlargement from 11-2-23-resolved         RESOLVED: Chronic bilateral low back pain without sciatica    Current Assessment & Plan     Resolved, thus delete.          Class 1  obesity due to excess calories with serious comorbidity and body mass index (BMI) of 30.0 to 30.9 in adult    Current Assessment & Plan     Patient's (Body mass index is 29.21 kg/m².) indicates that they are obese (BMI >30) with health conditions that include dyslipidemias . Weight is improving with lifestyle modifications. BMI  is above average; BMI management plan is completed. We discussed portion control and increasing exercise.          RESOLVED: Costochondritis    Current Assessment & Plan     Resolved, thus delete.          Depression    Current Assessment & Plan     Doing well.  Pt. Declines treatment         Relevant Medications    donepezil (Aricept) 5 MG tablet    Deviated septum    Current Assessment & Plan     Stable, hold on referral to ENT         RESOLVED: Diaphoresis    Current Assessment & Plan     Resolved, thus delete.          Encounter for general adult medical examination with abnormal findings    Current Assessment & Plan     Encouraged to do self-breast exam, self-testicle exams, and self derm exams. Congratulated on using seat belts.  Encouraged to do annual physical exams.  Immunization status reviewed.  Up-to-date but I question pneumococcal 20           Relevant Medications    vitamin C (ASCORBIC ACID) 250 MG tablet    vitamin E 400 UNIT capsule    Family history of coronary artery disease    Current Assessment & Plan     Advised to lower risk factors         Family history of diabetes mellitus    Current Assessment & Plan     Advised to lower risk factors         Ganglion cyst    Current Assessment & Plan     Very mild-follow conservatively         Hearing loss    Current Assessment & Plan     Stable.  Advised to wear hearing protection and avoid noise exposure         History of tobacco use    Overview     Last tobacco use 2018  PPY HX 26         Current Assessment & Plan     Congratulated on continued cessation         Hyperplastic colon polyp    Overview     Colonoscopy done 7-2023  with Dr. Danielson, advised repeat in 3 years in 7-2026         Current Assessment & Plan     Pt. Has adenomatous polyps, thus delete         Left upper quadrant pain    Current Assessment & Plan     New dx.  Probably 2nd to gas or gastritis, try Mylicon 80 mg PRN.  If symptoms worsen or change return for more aggressive workup         RESOLVED: Lethargy    Current Assessment & Plan     Mild.  Normal for age, thus delete.  CBC and TSH done 3-6-2024, read by me, reviewed with pt.  CBC and TSH was normal         RESOLVED: Memory loss    Current Assessment & Plan     Duplicate, thus delete         Nocturia    Current Assessment & Plan     Stable.  U/A done 3-6-2024, read by me, reviewed with pt.  U/A showed trace ketones         Obstructive sleep apnea syndrome    Overview     Dr. Seiple         Current Assessment & Plan     Doing well with C-pap         Pleurisy    Current Assessment & Plan     Resolved, thus delete.          Screening PSA (prostate specific antigen)    Current Assessment & Plan     Doing well.  PSA done 3-6-2024, read by me, reviewed with pt.  PSA was 2.1 same as last                 Encouraged to check his skin, testicles and breasts monthly. Reviewed exercising regularly, eating a balanced diet, immunizations and if due, patient counselled to check with insurance company for coverage;, and regularly checking skin and breasts.

## 2024-03-13 ENCOUNTER — OFFICE VISIT (OUTPATIENT)
Dept: FAMILY MEDICINE CLINIC | Facility: CLINIC | Age: 64
End: 2024-03-13
Payer: MEDICARE

## 2024-03-13 VITALS
HEART RATE: 104 BPM | TEMPERATURE: 98 F | BODY MASS INDEX: 29.15 KG/M2 | SYSTOLIC BLOOD PRESSURE: 138 MMHG | HEIGHT: 70 IN | WEIGHT: 203.6 LBS | OXYGEN SATURATION: 99 % | DIASTOLIC BLOOD PRESSURE: 84 MMHG | RESPIRATION RATE: 18 BRPM

## 2024-03-13 DIAGNOSIS — Z12.5 SCREENING PSA (PROSTATE SPECIFIC ANTIGEN): ICD-10-CM

## 2024-03-13 DIAGNOSIS — Z87.891 HISTORY OF TOBACCO USE: ICD-10-CM

## 2024-03-13 DIAGNOSIS — Z82.49 FAMILY HISTORY OF CORONARY ARTERY DISEASE: ICD-10-CM

## 2024-03-13 DIAGNOSIS — M50.90 CERVICAL DISC DISEASE: ICD-10-CM

## 2024-03-13 DIAGNOSIS — H90.2 CONDUCTIVE HEARING LOSS, UNSPECIFIED LATERALITY: ICD-10-CM

## 2024-03-13 DIAGNOSIS — R10.12 LEFT UPPER QUADRANT PAIN: ICD-10-CM

## 2024-03-13 DIAGNOSIS — E78.2 MIXED HYPERLIPIDEMIA: Primary | ICD-10-CM

## 2024-03-13 DIAGNOSIS — M54.50 CHRONIC BILATERAL LOW BACK PAIN WITHOUT SCIATICA: ICD-10-CM

## 2024-03-13 DIAGNOSIS — M94.0 COSTOCHONDRITIS: ICD-10-CM

## 2024-03-13 DIAGNOSIS — E66.09 CLASS 1 OBESITY DUE TO EXCESS CALORIES WITH SERIOUS COMORBIDITY AND BODY MASS INDEX (BMI) OF 30.0 TO 30.9 IN ADULT: ICD-10-CM

## 2024-03-13 DIAGNOSIS — R35.1 NOCTURIA: ICD-10-CM

## 2024-03-13 DIAGNOSIS — D12.6 ADENOMATOUS POLYP OF COLON, UNSPECIFIED PART OF COLON: ICD-10-CM

## 2024-03-13 DIAGNOSIS — G89.29 CHRONIC BILATERAL LOW BACK PAIN WITHOUT SCIATICA: ICD-10-CM

## 2024-03-13 DIAGNOSIS — S06.5XAA SUBDURAL HEMATOMA: ICD-10-CM

## 2024-03-13 DIAGNOSIS — Z83.3 FAMILY HISTORY OF DIABETES MELLITUS: ICD-10-CM

## 2024-03-13 DIAGNOSIS — R53.83 LETHARGY: ICD-10-CM

## 2024-03-13 DIAGNOSIS — Z00.01 ENCOUNTER FOR GENERAL ADULT MEDICAL EXAMINATION WITH ABNORMAL FINDINGS: ICD-10-CM

## 2024-03-13 DIAGNOSIS — R56.9 SEIZURES: ICD-10-CM

## 2024-03-13 DIAGNOSIS — M67.40 GANGLION CYST: ICD-10-CM

## 2024-03-13 DIAGNOSIS — R00.1 BRADYCARDIA: ICD-10-CM

## 2024-03-13 DIAGNOSIS — J34.2 DEVIATED SEPTUM: ICD-10-CM

## 2024-03-13 DIAGNOSIS — R61 DIAPHORESIS: ICD-10-CM

## 2024-03-13 DIAGNOSIS — K63.5 HYPERPLASTIC COLONIC POLYP, UNSPECIFIED PART OF COLON: ICD-10-CM

## 2024-03-13 DIAGNOSIS — G30.0 MILD EARLY ONSET ALZHEIMER'S DEMENTIA WITH ANXIETY: ICD-10-CM

## 2024-03-13 DIAGNOSIS — F32.A DEPRESSION, UNSPECIFIED DEPRESSION TYPE: ICD-10-CM

## 2024-03-13 DIAGNOSIS — R41.3 MEMORY LOSS: ICD-10-CM

## 2024-03-13 DIAGNOSIS — G56.01 CARPAL TUNNEL SYNDROME OF RIGHT WRIST: ICD-10-CM

## 2024-03-13 DIAGNOSIS — R07.9 CHEST PAIN, UNSPECIFIED TYPE: ICD-10-CM

## 2024-03-13 DIAGNOSIS — F02.A4 MILD EARLY ONSET ALZHEIMER'S DEMENTIA WITH ANXIETY: ICD-10-CM

## 2024-03-13 DIAGNOSIS — G47.33 OBSTRUCTIVE SLEEP APNEA SYNDROME: ICD-10-CM

## 2024-03-13 DIAGNOSIS — R09.1 PLEURISY: ICD-10-CM

## 2024-03-13 DIAGNOSIS — S12.591A OTHER CLOSED NONDISPLACED FRACTURE OF SIXTH CERVICAL VERTEBRA, INITIAL ENCOUNTER: ICD-10-CM

## 2024-03-13 RX ORDER — LEVETIRACETAM 750 MG/1
750 TABLET ORAL 2 TIMES DAILY
Start: 2024-03-13

## 2024-03-13 RX ORDER — DONEPEZIL HYDROCHLORIDE 5 MG/1
5 TABLET, FILM COATED ORAL NIGHTLY
Qty: 90 TABLET | Refills: 3 | Status: SHIPPED | OUTPATIENT
Start: 2024-03-13

## 2024-03-13 RX ORDER — MULTIVIT WITH MINERALS/LUTEIN
250 TABLET ORAL DAILY
Start: 2024-03-13

## 2024-03-13 RX ORDER — CHLORAL HYDRATE 500 MG
1000 CAPSULE ORAL
Start: 2024-03-13

## 2024-03-13 RX ORDER — VITAMIN E 268 MG
400 CAPSULE ORAL DAILY
Start: 2024-03-13

## 2024-03-13 NOTE — ASSESSMENT & PLAN NOTE
Resolved, thus delete.  EKG done 3-6-2024, results read by me, reviewed with pt.  EKG showed NSR with no abnormalities, prior bradycardia and Left atrial enlargement from 37-9-78-resolved

## 2024-03-13 NOTE — ASSESSMENT & PLAN NOTE
Mild.  Normal for age, thus delete.  CBC and TSH done 3-6-2024, read by me, reviewed with pt.  CBC and TSH was normal

## 2024-03-13 NOTE — ASSESSMENT & PLAN NOTE
Improved.  Patient tolerated Aricept 10 mg well without side effects. I feel the benefits of the medication outweigh the risks.

## 2024-03-13 NOTE — ASSESSMENT & PLAN NOTE
Encouraged to do self-breast exam, self-testicle exams, and self derm exams. Congratulated on using seat belts.  Encouraged to do annual physical exams.  Immunization status reviewed.  Up-to-date but I question pneumococcal 20

## 2024-03-13 NOTE — ASSESSMENT & PLAN NOTE
New dx.  Probably 2nd to gas or gastritis, try Mylicon 80 mg PRN.  If symptoms worsen or change return for more aggressive workup

## 2024-03-13 NOTE — ASSESSMENT & PLAN NOTE
Lipid and CMP done 3-6-2024, read by me, reviewed with pt.  Trig. 82 up from 60, Tot. Chol. 160 up from 155, HDL 35 down from 44,  up from 99  Worsening.   Encouraged to watch fatty intake, exercise more, and lose weight.   No medication other than Fish Oil  Is not getting adequate diet and exercise  Goals developed at last visit were not met   Follow up in  3 months  Care management needs are self-addressed. Self-management abilities addressed and patient is capable of managing his own disease.

## 2024-03-13 NOTE — ASSESSMENT & PLAN NOTE
Resolved x 3, thus delete.  EKG done 3-6-2024, results read by me, reviewed with pt.  EKG showed NSR with no abnormalities, prior bradycardia and Left atrial enlargement from 48-8-10-resolved

## 2024-03-13 NOTE — ASSESSMENT & PLAN NOTE
Patient's (Body mass index is 29.21 kg/m².) indicates that they are obese (BMI >30) with health conditions that include dyslipidemias . Weight is improving with lifestyle modifications. BMI  is above average; BMI management plan is completed. We discussed portion control and increasing exercise.

## 2024-05-09 ENCOUNTER — TELEPHONE (OUTPATIENT)
Dept: NEUROLOGY | Facility: CLINIC | Age: 64
End: 2024-05-09
Payer: COMMERCIAL

## 2024-05-09 DIAGNOSIS — G47.33 OBSTRUCTIVE SLEEP APNEA: Primary | ICD-10-CM

## 2024-05-09 NOTE — TELEPHONE ENCOUNTER
Caller: Mook Bose    Relationship: Self    Best call back number: [phone]    What orders are you requesting (i.e. lab or imaging):   CPAP SUPPLIES    In what timeframe would the patient need to come in:     Where will you receive your lab/imaging services:   [facility]    Additional notes:   PT'S FU SLEEP APPT IS 11-14-24. PT TRIED TO GET SLEEP SUPPLIES AND WAS TOLD HE COULDN'T GET THEM UNTIL HE SAW DR MARIEE AGAIN.    PLEASE CALL PT TO DISCUSS. PT IS WANTING TO KNOW IF THIS A INSURANCE REQUIREMENT?    PT IS ASKING WHERE ELSE CAN HE GET FILTERS FOR HIS SLEEP MACHINE?

## 2024-05-22 DIAGNOSIS — R56.9 SEIZURES: ICD-10-CM

## 2024-05-22 RX ORDER — LEVETIRACETAM 750 MG/1
750 TABLET ORAL 2 TIMES DAILY
Qty: 60 TABLET | Refills: 0 | Status: SHIPPED | OUTPATIENT
Start: 2024-05-22

## 2024-06-13 ENCOUNTER — OFFICE VISIT (OUTPATIENT)
Dept: FAMILY MEDICINE CLINIC | Facility: CLINIC | Age: 64
End: 2024-06-13
Payer: MEDICARE

## 2024-06-13 VITALS
RESPIRATION RATE: 18 BRPM | OXYGEN SATURATION: 97 % | WEIGHT: 202.2 LBS | DIASTOLIC BLOOD PRESSURE: 76 MMHG | HEIGHT: 71 IN | BODY MASS INDEX: 28.31 KG/M2 | TEMPERATURE: 98 F | HEART RATE: 87 BPM | SYSTOLIC BLOOD PRESSURE: 128 MMHG

## 2024-06-13 DIAGNOSIS — F02.A4 MILD EARLY ONSET ALZHEIMER'S DEMENTIA WITH ANXIETY: ICD-10-CM

## 2024-06-13 DIAGNOSIS — G30.0 MILD EARLY ONSET ALZHEIMER'S DEMENTIA WITH ANXIETY: ICD-10-CM

## 2024-06-13 DIAGNOSIS — S06.5XAA SUBDURAL HEMATOMA: ICD-10-CM

## 2024-06-13 DIAGNOSIS — E66.3 OVERWEIGHT (BMI 25.0-29.9): ICD-10-CM

## 2024-06-13 DIAGNOSIS — R56.9 SEIZURES: Primary | ICD-10-CM

## 2024-06-13 PROCEDURE — 1126F AMNT PAIN NOTED NONE PRSNT: CPT | Performed by: FAMILY MEDICINE

## 2024-06-13 PROCEDURE — 99214 OFFICE O/P EST MOD 30 MIN: CPT | Performed by: FAMILY MEDICINE

## 2024-06-13 PROCEDURE — 1159F MED LIST DOCD IN RCRD: CPT | Performed by: FAMILY MEDICINE

## 2024-06-13 PROCEDURE — 1160F RVW MEDS BY RX/DR IN RCRD: CPT | Performed by: FAMILY MEDICINE

## 2024-06-13 PROCEDURE — G2211 COMPLEX E/M VISIT ADD ON: HCPCS | Performed by: FAMILY MEDICINE

## 2024-06-13 NOTE — ASSESSMENT & PLAN NOTE
Patient's (Body mass index is 28.6 kg/m².) indicates that they are overweight with health conditions that include hypertension and dyslipidemias . Weight is unchanged. BMI is is above average; BMI management plan is completed. We discussed low calorie, low carb based diet program, portion control, and increasing exercise.

## 2024-06-13 NOTE — ASSESSMENT & PLAN NOTE
Doing well;  Patient tolerated Keppra well without side effects. I feel the benefits of the medication outweigh the risks.

## 2024-06-13 NOTE — ASSESSMENT & PLAN NOTE
Worsening; Advised patient to increase Aricept to 10 mg daily.  Patient tolerated aricept well without side effects. I feel the benefits of the medication outweigh the risks.

## 2024-06-13 NOTE — PROGRESS NOTES
Subjective   Mook Bose is a 64 y.o. male.     Memory Loss  This is a chronic problem. The current episode started more than 1 year ago. The problem occurs daily. The problem has been gradually worsening. Pertinent negatives include no chills, diaphoresis, fatigue or fever.   Seizures   This is a chronic problem. The current episode started more than 1 week ago. The problem has not changed since onset.       The following portions of the patient's history were reviewed and updated as appropriate: allergies, current medications, past family history, past medical history, past social history, past surgical history, and problem list.    Family History   Problem Relation Age of Onset    Alzheimer's disease Mother 62    Cancer Father         Unknown    Alzheimer's disease Sister 70    Heart disease Brother         dx at 59 living at 75    No Known Problems Daughter     No Known Problems Son     Heart disease Maternal Grandfather         MI at 44,  at 78    Stroke Maternal Grandfather     Hypertension Brother     Cancer Brother         dx at 49, living at 66    Other Brother         HEP. C       Social History     Tobacco Use    Smoking status: Former     Current packs/day: 0.00     Average packs/day: 1 pack/day for 26.0 years (26.0 ttl pk-yrs)     Types: Cigars, Cigarettes     Start date: 1974     Quit date:      Years since quittin.4     Passive exposure: Past    Smokeless tobacco: Never   Vaping Use    Vaping status: Never Used   Substance Use Topics    Alcohol use: No    Drug use: No       Past Surgical History:   Procedure Laterality Date    CRANIOTOMY  2009    VASECTOMY  1992       Patient Active Problem List   Diagnosis    Seizures    Closed nondisplaced fracture of sixth cervical vertebra    Subdural hematoma    Overweight (BMI 25.0-29.9)    History of tobacco use    Hearing loss    Screening PSA (prostate specific antigen)    Hyperlipidemia    Encounter for general adult medical  "examination with abnormal findings    Deviated septum    Carpal tunnel syndrome of right wrist    Cervical disc disease    Hyperplastic colon polyp    Ganglion cyst    Family history of coronary artery disease    Family history of diabetes mellitus    Obstructive sleep apnea syndrome    Depression    Adenomatous colon polyp    Pleurisy    Mild early onset Alzheimer's dementia    Nocturia    Left upper quadrant pain       Current Outpatient Medications on File Prior to Visit   Medication Sig Dispense Refill    aspirin 81 MG tablet Take 1 tablet by mouth Daily.      levETIRAcetam (KEPPRA) 750 MG tablet Take 1 tablet by mouth twice daily 60 tablet 0    Omega-3 Fatty Acids (fish oil) 1000 MG capsule capsule Take 1 capsule by mouth Daily With Breakfast.      vitamin C (ASCORBIC ACID) 250 MG tablet Take 1 tablet by mouth Daily.      vitamin E 400 UNIT capsule Take 1 capsule by mouth Daily.       No current facility-administered medications on file prior to visit.       Allergies   Allergen Reactions    Penicillins Hives       Review of Systems   Constitutional:  Negative for chills, diaphoresis, fatigue and fever.   Neurological:  Positive for seizures and memory problem.       Objective   Visit Vitals  /76 (BP Location: Left arm, Patient Position: Sitting, Cuff Size: Adult)   Pulse 87   Temp 98 °F (36.7 °C) (Temporal)   Resp 18   Ht 179.1 cm (70.5\")   Wt 91.7 kg (202 lb 3.2 oz)   SpO2 97%   BMI 28.60 kg/m²     Physical Exam  Vitals and nursing note reviewed.   Constitutional:       Appearance: He is well-developed.   HENT:      Head: Normocephalic.   Neck:      Thyroid: No thyromegaly.      Vascular: No carotid bruit.      Trachea: Trachea normal.   Cardiovascular:      Rate and Rhythm: Normal rate and regular rhythm.      Heart sounds: No murmur heard.     No friction rub. No gallop.   Pulmonary:      Effort: Pulmonary effort is normal. No respiratory distress.      Breath sounds: Normal breath sounds. No " wheezing.   Chest:      Chest wall: No tenderness.   Musculoskeletal:      Cervical back: Neck supple.   Skin:     General: Skin is dry.      Findings: No rash.      Nails: There is no clubbing.   Neurological:      General: No focal deficit present.      Mental Status: He is alert and oriented to person, place, and time.      Cranial Nerves: Cranial nerves 2-12 are intact.   Psychiatric:         Behavior: Behavior is cooperative.           Assessment & Plan .  Problem List Items Addressed This Visit          High    Mild early onset Alzheimer's dementia    Current Assessment & Plan     Worsening; Advised patient to increase Aricept to 10 mg daily.  Patient tolerated aricept well without side effects. I feel the benefits of the medication outweigh the risks.           Relevant Medications    donepezil (ARICEPT) 10 MG tablet    Seizures - Primary    Overview     Last seizure was 10-1-2023  Followed with Dr. Seiple  EEG done 10-19-23,         Current Assessment & Plan     Doing well;  Patient tolerated Keppra well without side effects. I feel the benefits of the medication outweigh the risks.           Subdural hematoma    Overview     Probably contributes to memory loss         Current Assessment & Plan     Doing well            Unprioritized    Overweight (BMI 25.0-29.9)    Current Assessment & Plan     Patient's (Body mass index is 28.6 kg/m².) indicates that they are overweight with health conditions that include hypertension and dyslipidemias . Weight is unchanged. BMI is is above average; BMI management plan is completed. We discussed low calorie, low carb based diet program, portion control, and increasing exercise.

## 2024-06-14 LAB
ALBUMIN SERPL-MCNC: 4.2 G/DL (ref 3.9–4.9)
ALBUMIN/GLOB SERPL: 1.6 {RATIO}
ALP SERPL-CCNC: 68 IU/L (ref 44–121)
ALT SERPL-CCNC: 15 IU/L (ref 0–44)
AST SERPL-CCNC: 11 IU/L (ref 0–40)
BILIRUB SERPL-MCNC: 0.3 MG/DL (ref 0–1.2)
BUN SERPL-MCNC: 16 MG/DL (ref 8–27)
BUN/CREAT SERPL: 17 (ref 10–24)
CALCIUM SERPL-MCNC: 9.5 MG/DL (ref 8.6–10.2)
CHLORIDE SERPL-SCNC: 103 MMOL/L (ref 96–106)
CHOLEST SERPL-MCNC: 159 MG/DL (ref 100–199)
CHOLEST/HDLC SERPL: 5 RATIO (ref 0–5)
CO2 SERPL-SCNC: 24 MMOL/L (ref 20–29)
CREAT SERPL-MCNC: 0.95 MG/DL (ref 0.76–1.27)
EGFRCR SERPLBLD CKD-EPI 2021: 89 ML/MIN/1.73
GLOBULIN SER CALC-MCNC: 2.7 G/DL (ref 1.5–4.5)
GLUCOSE SERPL-MCNC: 76 MG/DL (ref 70–99)
HDLC SERPL-MCNC: 32 MG/DL
LDLC SERPL CALC-MCNC: 103 MG/DL (ref 0–99)
POTASSIUM SERPL-SCNC: 4 MMOL/L (ref 3.5–5.2)
PROT SERPL-MCNC: 6.9 G/DL (ref 6–8.5)
SODIUM SERPL-SCNC: 141 MMOL/L (ref 134–144)
TRIGL SERPL-MCNC: 131 MG/DL (ref 0–149)
VLDLC SERPL CALC-MCNC: 24 MG/DL (ref 5–40)

## 2024-06-14 RX ORDER — DONEPEZIL HYDROCHLORIDE 10 MG/1
10 TABLET, FILM COATED ORAL NIGHTLY
Qty: 90 TABLET | Refills: 3 | Status: SHIPPED | OUTPATIENT
Start: 2024-06-14

## 2024-06-17 ENCOUNTER — OFFICE VISIT (OUTPATIENT)
Dept: FAMILY MEDICINE CLINIC | Facility: CLINIC | Age: 64
End: 2024-06-17
Payer: MEDICARE

## 2024-06-17 VITALS
WEIGHT: 203.4 LBS | OXYGEN SATURATION: 96 % | TEMPERATURE: 97.3 F | BODY MASS INDEX: 28.48 KG/M2 | RESPIRATION RATE: 14 BRPM | SYSTOLIC BLOOD PRESSURE: 121 MMHG | HEART RATE: 59 BPM | HEIGHT: 71 IN | DIASTOLIC BLOOD PRESSURE: 76 MMHG

## 2024-06-17 DIAGNOSIS — F02.A4 MILD EARLY ONSET ALZHEIMER'S DEMENTIA WITH ANXIETY: ICD-10-CM

## 2024-06-17 DIAGNOSIS — E78.2 MIXED HYPERLIPIDEMIA: Primary | ICD-10-CM

## 2024-06-17 DIAGNOSIS — E66.3 OVERWEIGHT (BMI 25.0-29.9): ICD-10-CM

## 2024-06-17 DIAGNOSIS — M54.10 BACK PAIN WITH RADICULOPATHY: ICD-10-CM

## 2024-06-17 DIAGNOSIS — G30.0 MILD EARLY ONSET ALZHEIMER'S DEMENTIA WITH ANXIETY: ICD-10-CM

## 2024-06-17 DIAGNOSIS — Z13.6 SCREENING FOR CARDIOVASCULAR CONDITION: ICD-10-CM

## 2024-06-17 DIAGNOSIS — S06.5XAA SUBDURAL HEMATOMA: ICD-10-CM

## 2024-06-17 PROCEDURE — 1126F AMNT PAIN NOTED NONE PRSNT: CPT | Performed by: FAMILY MEDICINE

## 2024-06-17 PROCEDURE — G2211 COMPLEX E/M VISIT ADD ON: HCPCS | Performed by: FAMILY MEDICINE

## 2024-06-17 PROCEDURE — 1160F RVW MEDS BY RX/DR IN RCRD: CPT | Performed by: FAMILY MEDICINE

## 2024-06-17 PROCEDURE — 1159F MED LIST DOCD IN RCRD: CPT | Performed by: FAMILY MEDICINE

## 2024-06-17 PROCEDURE — 99214 OFFICE O/P EST MOD 30 MIN: CPT | Performed by: FAMILY MEDICINE

## 2024-06-17 NOTE — ASSESSMENT & PLAN NOTE
Stable but not at goal  Encouraged to watch fatty intake, exercise more, and lose weight.   No medication    Is not getting adequate diet and exercise  Goals developed at last visit were met   Follow up in  3 months  Care management needs are self-addressed.  Self-management abilities addressed and patient is capable of managing his own disease.

## 2024-06-17 NOTE — PROGRESS NOTES
Subjective   Mook Bose is a 64 y.o. male.     Hyperlipidemia  This is a chronic problem. The current episode started more than 1 year ago. The problem is controlled. Pertinent negatives include no myalgias. He is currently on no antihyperlipidemic treatment.   Memory Loss  This is a chronic problem. The current episode started more than 1 year ago. The problem occurs daily. The problem has been unchanged. Pertinent negatives include no myalgias or nausea.        The following portions of the patient's history were reviewed and updated as appropriate: allergies, current medications, past family history, past medical history, past social history, past surgical history, and problem list.    Family History   Problem Relation Age of Onset    Alzheimer's disease Mother 62    Cancer Father         Unknown    Alzheimer's disease Sister 70    Heart disease Brother         dx at 59 living at 75    No Known Problems Daughter     No Known Problems Son     Heart disease Maternal Grandfather         MI at 44,  at 78    Stroke Maternal Grandfather     Hypertension Brother     Cancer Brother         dx at 49, living at 66    Other Brother         HEP. C       Social History     Tobacco Use    Smoking status: Former     Current packs/day: 0.00     Average packs/day: 1 pack/day for 26.0 years (26.0 ttl pk-yrs)     Types: Cigars, Cigarettes     Start date: 1974     Quit date:      Years since quittin.4     Passive exposure: Past    Smokeless tobacco: Never   Vaping Use    Vaping status: Never Used   Substance Use Topics    Alcohol use: No    Drug use: No       Past Surgical History:   Procedure Laterality Date    CRANIOTOMY  2009    VASECTOMY  1992       Patient Active Problem List   Diagnosis    Seizures    Closed nondisplaced fracture of sixth cervical vertebra    Subdural hematoma    Overweight (BMI 25.0-29.9)    History of tobacco use    Hearing loss    Screening PSA (prostate specific antigen)  "   Hyperlipidemia    Encounter for general adult medical examination with abnormal findings    Deviated septum    Carpal tunnel syndrome of right wrist    Cervical disc disease    Hyperplastic colon polyp    Ganglion cyst    Family history of coronary artery disease    Family history of diabetes mellitus    Obstructive sleep apnea syndrome    Depression    Adenomatous colon polyp    Pleurisy    Mild early onset Alzheimer's dementia    Nocturia    Left upper quadrant pain    Back pain with radiculopathy       Current Outpatient Medications on File Prior to Visit   Medication Sig Dispense Refill    aspirin 81 MG tablet Take 1 tablet by mouth Daily.      donepezil (ARICEPT) 10 MG tablet Take 1 tablet by mouth Every Night. 90 tablet 3    levETIRAcetam (KEPPRA) 750 MG tablet Take 1 tablet by mouth twice daily 60 tablet 0    Omega-3 Fatty Acids (fish oil) 1000 MG capsule capsule Take 1 capsule by mouth Daily With Breakfast.      vitamin C (ASCORBIC ACID) 250 MG tablet Take 1 tablet by mouth Daily.      vitamin E 400 UNIT capsule Take 1 capsule by mouth Daily.       No current facility-administered medications on file prior to visit.       Allergies   Allergen Reactions    Penicillins Hives       Review of Systems   Constitutional:  Negative for unexpected weight gain and unexpected weight loss.   Gastrointestinal:  Negative for nausea.   Musculoskeletal:  Negative for myalgias.   Skin:  Negative for dry skin.   Neurological:  Positive for memory problem.       Objective   Visit Vitals  /76 (BP Location: Left arm, Patient Position: Sitting, Cuff Size: Adult)   Pulse 59   Temp 97.3 °F (36.3 °C)   Resp 14   Ht 179.1 cm (70.5\")   Wt 92.3 kg (203 lb 6.4 oz)   SpO2 96%   BMI 28.77 kg/m²     Physical Exam  Vitals and nursing note reviewed.   Constitutional:       Appearance: He is well-developed.   HENT:      Head: Normocephalic.   Neck:      Thyroid: No thyromegaly.      Vascular: No carotid bruit.      Trachea: Trachea " normal.   Cardiovascular:      Rate and Rhythm: Normal rate and regular rhythm.      Heart sounds: No murmur heard.     No friction rub. No gallop.   Pulmonary:      Effort: Pulmonary effort is normal. No respiratory distress.      Breath sounds: Normal breath sounds. No wheezing.   Chest:      Chest wall: No tenderness.   Musculoskeletal:      Cervical back: Neck supple.   Skin:     General: Skin is dry.      Findings: No rash.      Nails: There is no clubbing.   Neurological:      Mental Status: He is alert and oriented to person, place, and time.   Psychiatric:         Behavior: Behavior is cooperative.           Assessment & Plan .  Problem List Items Addressed This Visit          High    Mild early onset Alzheimer's dementia    Current Assessment & Plan     Stable- Patient tolerated aricept well without side effects. I feel the benefits of the medication outweigh the risks. Patient uncertain if this medication helped any. Patient is scheduled to see Dr Seipel in September, 2024  Mini-mental exam done today and scored 26/30         Subdural hematoma    Overview     Probably contributes to memory loss            Medium    Hyperlipidemia - Primary    Overview     He is not on a statin but needs a calcium score         Current Assessment & Plan     Stable but not at goal  Encouraged to watch fatty intake, exercise more, and lose weight.   No medication    Is not getting adequate diet and exercise  Goals developed at last visit were met   Follow up in  3 months  Care management needs are self-addressed.  Self-management abilities addressed and patient is capable of managing his own disease.           Relevant Orders    Comprehensive Metabolic Panel    Lipid Panel With / Chol / HDL Ratio       Unprioritized    Back pain with radiculopathy    Current Assessment & Plan     Slightly worsening; offered further testing-patient declines.          Overweight (BMI 25.0-29.9)    Current Assessment & Plan     Patient's (Body  mass index is 28.77 kg/m².) indicates that they are overweight with health conditions that include dyslipidemias . Weight is unchanged. BMI is is above average; BMI management plan is completed. We discussed low calorie, low carb based diet program, portion control, and increasing exercise.           Other Visit Diagnoses       Screening for cardiovascular condition        Relevant Orders    CT Cardiac Calcium Score Without Dye    Vascular Screening (Bundle) CAR

## 2024-06-17 NOTE — ASSESSMENT & PLAN NOTE
Patient's (Body mass index is 28.77 kg/m².) indicates that they are overweight with health conditions that include dyslipidemias . Weight is unchanged. BMI is is above average; BMI management plan is completed. We discussed low calorie, low carb based diet program, portion control, and increasing exercise.

## 2024-06-17 NOTE — ASSESSMENT & PLAN NOTE
Stable- Patient tolerated aricept well without side effects. I feel the benefits of the medication outweigh the risks. Patient uncertain if this medication helped any. Patient is scheduled to see Dr Seipel in September, 2024  Mini-mental exam done today and scored 26/30

## 2024-06-29 DIAGNOSIS — R56.9 SEIZURES: ICD-10-CM

## 2024-07-01 RX ORDER — LEVETIRACETAM 750 MG/1
750 TABLET ORAL 2 TIMES DAILY
Qty: 60 TABLET | Refills: 0 | Status: SHIPPED | OUTPATIENT
Start: 2024-07-01

## 2024-07-02 ENCOUNTER — HOSPITAL ENCOUNTER (OUTPATIENT)
Dept: CT IMAGING | Facility: HOSPITAL | Age: 64
Discharge: HOME | End: 2024-07-02

## 2024-07-02 ENCOUNTER — HOSPITAL ENCOUNTER (OUTPATIENT)
Dept: ULTRASOUND IMAGING | Facility: HOSPITAL | Age: 64
Discharge: HOME | End: 2024-07-02

## 2024-07-02 PROCEDURE — 75571 CT HRT W/O DYE W/CA TEST: CPT

## 2024-07-02 PROCEDURE — 93799 UNLISTED CV SVC/PROCEDURE: CPT

## 2024-07-03 ENCOUNTER — TELEPHONE (OUTPATIENT)
Dept: FAMILY MEDICINE CLINIC | Facility: CLINIC | Age: 64
End: 2024-07-03
Payer: COMMERCIAL

## 2024-07-03 DIAGNOSIS — E04.1 THYROID NODULE: Primary | ICD-10-CM

## 2024-07-03 LAB
BH CV LEA LEFT PTA DISTAL PSV: 69.2 CM/S
BH CV LEA RIGHT PTA DISTAL PSV: 98.9 CM/S
BH CV VAS SCREENING CAROTID CCA LEFT: 88.6 CM/SEC
BH CV VAS SCREENING CAROTID CCA RIGHT: 82.4 CM/SEC
BH CV VAS SCREENING CAROTID ICA LEFT: 67.2 CM/SEC
BH CV VAS SCREENING CAROTID ICA RIGHT: 62.2 CM/SEC
BH CV XLRA MEAS - MID AO DIAM: 2.2 CM
BH CV XLRA MEAS - PAD LEFT ABI PT: 1.1
BH CV XLRA MEAS - PAD LEFT ARM: 134 MMHG
BH CV XLRA MEAS - PAD LEFT LEG PT: 153 MMHG
BH CV XLRA MEAS - PAD RIGHT ABI PT: 1.1
BH CV XLRA MEAS - PAD RIGHT ARM: 132 MMHG
BH CV XLRA MEAS - PAD RIGHT LEG PT: 144 MMHG
BH CV XLRA MEAS LEFT DIST CCA EDV: 28.4 CM/SEC
BH CV XLRA MEAS LEFT DIST CCA PSV: 88.6 CM/SEC
BH CV XLRA MEAS LEFT ICA/CCA RATIO: 0.8
BH CV XLRA MEAS LEFT PROX ICA EDV: 12.8 CM/SEC
BH CV XLRA MEAS LEFT PROX ICA PSV: 67.2 CM/SEC
BH CV XLRA MEAS RIGHT DIST CCA EDV: 23.1 CM/SEC
BH CV XLRA MEAS RIGHT DIST CCA PSV: 82.4 CM/SEC
BH CV XLRA MEAS RIGHT ICA/CCA RATIO: 0.8
BH CV XLRA MEAS RIGHT PROX ICA EDV: 11.1 CM/SEC
BH CV XLRA MEAS RIGHT PROX ICA PSV: 62.2 CM/SEC

## 2024-07-03 NOTE — TELEPHONE ENCOUNTER
Called patient and advised him that a thyroid nodule was seen on vascular study- Recommended an ultrasound of the thyroid-I will place an order and someone will call him to schedule

## 2024-07-08 ENCOUNTER — OFFICE VISIT (OUTPATIENT)
Dept: FAMILY MEDICINE CLINIC | Facility: CLINIC | Age: 64
End: 2024-07-08
Payer: MEDICARE

## 2024-07-08 VITALS
BODY MASS INDEX: 28.34 KG/M2 | WEIGHT: 202.4 LBS | TEMPERATURE: 96.4 F | OXYGEN SATURATION: 97 % | HEIGHT: 71 IN | DIASTOLIC BLOOD PRESSURE: 78 MMHG | HEART RATE: 84 BPM | SYSTOLIC BLOOD PRESSURE: 133 MMHG | RESPIRATION RATE: 15 BRPM

## 2024-07-08 DIAGNOSIS — G30.0 MILD EARLY ONSET ALZHEIMER'S DEMENTIA WITH ANXIETY: ICD-10-CM

## 2024-07-08 DIAGNOSIS — E78.2 MIXED HYPERLIPIDEMIA: ICD-10-CM

## 2024-07-08 DIAGNOSIS — F02.A4 MILD EARLY ONSET ALZHEIMER'S DEMENTIA WITH ANXIETY: ICD-10-CM

## 2024-07-08 DIAGNOSIS — S06.5XAA SUBDURAL HEMATOMA: ICD-10-CM

## 2024-07-08 DIAGNOSIS — E66.3 OVERWEIGHT (BMI 25.0-29.9): Primary | ICD-10-CM

## 2024-07-08 DIAGNOSIS — R93.1 HIGH CORONARY ARTERY CALCIUM SCORE: ICD-10-CM

## 2024-07-08 DIAGNOSIS — R41.3 MEMORY LOSS: ICD-10-CM

## 2024-07-08 PROCEDURE — G2211 COMPLEX E/M VISIT ADD ON: HCPCS | Performed by: FAMILY MEDICINE

## 2024-07-08 PROCEDURE — 1159F MED LIST DOCD IN RCRD: CPT | Performed by: FAMILY MEDICINE

## 2024-07-08 PROCEDURE — 1126F AMNT PAIN NOTED NONE PRSNT: CPT | Performed by: FAMILY MEDICINE

## 2024-07-08 PROCEDURE — 1160F RVW MEDS BY RX/DR IN RCRD: CPT | Performed by: FAMILY MEDICINE

## 2024-07-08 PROCEDURE — 99214 OFFICE O/P EST MOD 30 MIN: CPT | Performed by: FAMILY MEDICINE

## 2024-07-08 RX ORDER — ROSUVASTATIN CALCIUM 5 MG/1
5 TABLET, COATED ORAL DAILY
Qty: 90 TABLET | Refills: 1 | Status: SHIPPED | OUTPATIENT
Start: 2024-07-08

## 2024-07-08 NOTE — ASSESSMENT & PLAN NOTE
Patient's (Body mass index is 28.63 kg/m².) indicates that they are overweight with health conditions that include dyslipidemias . Weight is unchanged. BMI is is above average; BMI management plan is completed. We discussed low calorie, low carb based diet program, portion control, and increasing exercise.

## 2024-07-08 NOTE — ASSESSMENT & PLAN NOTE
Advised patient to start Crestor 5 mg daily due to elevated calcium score.  Benefits and risk discussed I feel benefits outweigh the risk.

## 2024-07-08 NOTE — ASSESSMENT & PLAN NOTE
Patient feels like this is worsening; Advised patient to discuss with with Dr. Seipel at his next appointment in September.

## 2024-07-08 NOTE — PROGRESS NOTES
Subjective   Mook Bose is a 64 y.o. male.     History of Present Illness    Elevated Calcium Score    Memory Loss  This is a chronic problem. The current episode started more than 1 year ago. The problem occurs daily. The problem has been gradually worsening. Pertinent negatives include no chest pain. Nothing aggravates the symptoms.        The following portions of the patient's history were reviewed and updated as appropriate: allergies, current medications, past family history, past medical history, past social history, past surgical history, and problem list.    Family History   Problem Relation Age of Onset    Alzheimer's disease Mother 62    Cancer Father         Unknown    Alzheimer's disease Sister 70    Heart disease Brother         dx at 59 living at 75    No Known Problems Daughter     No Known Problems Son     Heart disease Maternal Grandfather         MI at 44,  at 78    Stroke Maternal Grandfather     Hypertension Brother     Cancer Brother         dx at 49, living at 66    Other Brother         HEP. C       Social History     Tobacco Use    Smoking status: Former     Current packs/day: 0.00     Average packs/day: 1 pack/day for 26.0 years (26.0 ttl pk-yrs)     Types: Cigars, Cigarettes     Start date: 1974     Quit date:      Years since quittin.5     Passive exposure: Past    Smokeless tobacco: Never   Vaping Use    Vaping status: Never Used   Substance Use Topics    Alcohol use: No    Drug use: No       Past Surgical History:   Procedure Laterality Date    CRANIOTOMY  2009    VASECTOMY         Patient Active Problem List   Diagnosis    Seizures    Closed nondisplaced fracture of sixth cervical vertebra    Subdural hematoma    Overweight (BMI 25.0-29.9)    History of tobacco use    Hearing loss    Screening PSA (prostate specific antigen)    Hyperlipidemia    Encounter for general adult medical examination with abnormal findings    Deviated septum    Carpal  "tunnel syndrome of right wrist    Cervical disc disease    Hyperplastic colon polyp    Ganglion cyst    Family history of coronary artery disease    Family history of diabetes mellitus    Obstructive sleep apnea syndrome    Depression    Adenomatous colon polyp    Pleurisy    Mild early onset Alzheimer's dementia    Nocturia    Left upper quadrant pain    Back pain with radiculopathy    High coronary artery calcium score    Memory loss       Current Outpatient Medications on File Prior to Visit   Medication Sig Dispense Refill    aspirin 81 MG tablet Take 1 tablet by mouth Daily.      donepezil (ARICEPT) 10 MG tablet Take 1 tablet by mouth Every Night. 90 tablet 3    levETIRAcetam (KEPPRA) 750 MG tablet Take 1 tablet by mouth twice daily 60 tablet 0    Omega-3 Fatty Acids (fish oil) 1000 MG capsule capsule Take 1 capsule by mouth Daily With Breakfast.      vitamin C (ASCORBIC ACID) 250 MG tablet Take 1 tablet by mouth Daily.      vitamin E 400 UNIT capsule Take 1 capsule by mouth Daily.       No current facility-administered medications on file prior to visit.       Allergies   Allergen Reactions    Penicillins Hives       Review of Systems   Respiratory:  Negative for chest tightness.    Cardiovascular:  Negative for chest pain, palpitations and leg swelling.   Neurological:  Positive for memory problem.       Objective   Visit Vitals  /78 (BP Location: Left arm, Patient Position: Sitting, Cuff Size: Adult)   Pulse 84   Temp 96.4 °F (35.8 °C)   Resp 15   Ht 179.1 cm (70.5\")   Wt 91.8 kg (202 lb 6.4 oz)   SpO2 97%   BMI 28.63 kg/m²     Physical Exam  Vitals and nursing note reviewed.   Constitutional:       Appearance: He is well-developed.   HENT:      Head: Normocephalic.   Neck:      Thyroid: No thyromegaly.      Vascular: No carotid bruit.      Trachea: Trachea normal.   Cardiovascular:      Rate and Rhythm: Normal rate and regular rhythm.      Heart sounds: No murmur heard.     No friction rub. No gallop. "   Pulmonary:      Effort: Pulmonary effort is normal. No respiratory distress.      Breath sounds: Normal breath sounds. No wheezing.   Chest:      Chest wall: No tenderness.   Musculoskeletal:      Cervical back: Neck supple.   Skin:     General: Skin is dry.      Findings: No rash.      Nails: There is no clubbing.   Neurological:      General: No focal deficit present.      Mental Status: He is alert and oriented to person, place, and time.      Cranial Nerves: Cranial nerves 2-12 are intact.      Motor: Motor function is intact.   Psychiatric:         Behavior: Behavior is cooperative.           Assessment & Plan .  Problem List Items Addressed This Visit          High    Mild early onset Alzheimer's dementia    Current Assessment & Plan     Patient feels like this is worsening; Advised patient to discuss with with Dr. Seipel at his next appointment in September.          Subdural hematoma    Overview     Probably contributes to memory loss            Medium    Hyperlipidemia    Overview     7-5-24- calcium score 819         Current Assessment & Plan      Advised patient to start Crestor 5 mg daily due to elevated calcium score.  Benefits and risk discussed I feel benefits outweigh the risk.         Relevant Medications    rosuvastatin (Crestor) 5 MG tablet       Unprioritized    High coronary artery calcium score    Current Assessment & Plan     New diagnosis- Calcium Score 819- Advised patient to start Crestor 5 mg daily.  Advised to keep risk factors in good control.  Recommended that he see a cardiologist. Will place a referral for an appointment.          Relevant Orders    Ambulatory Referral to Cardiology    Memory loss    Current Assessment & Plan     Possibly from early alzheimers or head injury.          Overweight (BMI 25.0-29.9) - Primary    Current Assessment & Plan     Patient's (Body mass index is 28.63 kg/m².) indicates that they are overweight with health conditions that include dyslipidemias .  Weight is unchanged. BMI is is above average; BMI management plan is completed. We discussed low calorie, low carb based diet program, portion control, and increasing exercise.

## 2024-07-08 NOTE — ASSESSMENT & PLAN NOTE
New diagnosis- Calcium Score 819- Advised patient to start Crestor 5 mg daily.  Advised to keep risk factors in good control.  Recommended that he see a cardiologist. Will place a referral for an appointment.

## 2024-07-09 PROBLEM — R41.3 MEMORY LOSS: Status: ACTIVE | Noted: 2024-07-09

## 2024-07-15 ENCOUNTER — HOSPITAL ENCOUNTER (OUTPATIENT)
Dept: ULTRASOUND IMAGING | Facility: HOSPITAL | Age: 64
Discharge: HOME | End: 2024-07-15
Admitting: FAMILY MEDICINE
Payer: MEDICARE

## 2024-07-15 DIAGNOSIS — E04.1 THYROID NODULE: ICD-10-CM

## 2024-07-15 PROCEDURE — 76536 US EXAM OF HEAD AND NECK: CPT

## 2024-07-17 ENCOUNTER — TELEPHONE (OUTPATIENT)
Dept: FAMILY MEDICINE CLINIC | Facility: CLINIC | Age: 64
End: 2024-07-17
Payer: COMMERCIAL

## 2024-07-23 NOTE — PROGRESS NOTES
Date of Office Visit: 2024  Encounter Provider: Dr. James Paul  Place of Service: [facility] CARDIOLOGY [place]  Patient Name: Mook Carr  :1960  Jason Covington MD    Chief Complaint   Patient presents with    Hyperlipidemia    Consult     History of Present Illness:    I am pleased to see Mr. carr in my office today as a new consultation.    As you know, patient is 64-year-old white gentleman whose past medical history is significant for hyperlipidemia, traumatic brain injury, who is referred to me for abnormal CT calcium scoring test and shortness of breath.    In 2024, patient underwent calcium scoring CT scan and was noted to have high scores of 819.1.  Patient had 75 units in LAD, and 744.2 units and RCA.  Left main and LCx was free of calcification.    Patient reports that he is short of breath with exertion.  He denies any chest pain.  Patient denies any orthopnea, PND, syncope or presyncope.  No leg edema noted.    At this stage, I advised the patient to monitor the blood pressure at home and bring the logbook.  I would proceed with Crestor 10 mg daily.  I would proceed with stress test.  If needed patient may need losartan for control of blood pressure.  Patient is advised to monitor the blood pressure at home        Past Medical History:   Diagnosis Date    Hyperlipidemia     Seizures          Past Surgical History:   Procedure Laterality Date    CRANIOTOMY  2009    VASECTOMY             Current Outpatient Medications:     aspirin 81 MG tablet, Take 1 tablet by mouth Daily., Disp: , Rfl:     donepezil (ARICEPT) 10 MG tablet, Take 1 tablet by mouth Every Night., Disp: 90 tablet, Rfl: 3    levETIRAcetam (KEPPRA) 750 MG tablet, Take 1 tablet by mouth twice daily, Disp: 60 tablet, Rfl: 0    Omega-3 Fatty Acids (fish oil) 1000 MG capsule capsule, Take 1 capsule by mouth Daily With Breakfast., Disp: , Rfl:     rosuvastatin (Crestor) 10 MG tablet, Take 1 tablet by  "mouth Daily., Disp: 90 tablet, Rfl: 1    vitamin C (ASCORBIC ACID) 250 MG tablet, Take 1 tablet by mouth Daily., Disp: , Rfl:     vitamin E 400 UNIT capsule, Take 1 capsule by mouth Daily., Disp: , Rfl:       Social History     Socioeconomic History    Marital status:    Tobacco Use    Smoking status: Former     Current packs/day: 0.00     Average packs/day: 1 pack/day for 26.0 years (26.0 ttl pk-yrs)     Types: Cigars, Cigarettes     Start date: 1974     Quit date:      Years since quittin.5     Passive exposure: Past    Smokeless tobacco: Former     Types: Chew   Vaping Use    Vaping status: Never Used   Substance and Sexual Activity    Alcohol use: No    Drug use: No    Sexual activity: Defer         Review of Systems   Constitutional: Negative for chills and fever.   HENT:  Negative for ear discharge and nosebleeds.    Eyes:  Negative for discharge and redness.   Cardiovascular:  Negative for chest pain, orthopnea, palpitations, paroxysmal nocturnal dyspnea and syncope.   Respiratory:  Positive for shortness of breath. Negative for cough and wheezing.    Endocrine: Negative for heat intolerance.   Skin:  Negative for rash.   Musculoskeletal:  Negative for arthritis and myalgias.   Gastrointestinal:  Negative for abdominal pain, melena, nausea and vomiting.   Genitourinary:  Negative for dysuria and hematuria.   Neurological:  Negative for dizziness, light-headedness, numbness and tremors.   Psychiatric/Behavioral:  Negative for depression. The patient is not nervous/anxious.        Procedures    Procedures    No orders to display           Objective:    /95 (BP Location: Right arm, Patient Position: Sitting, Cuff Size: Large Adult)   Pulse 73   Resp 16   Ht 179.1 cm (70.51\")   Wt 89.8 kg (198 lb)   SpO2 94%   BMI 28.00 kg/m²         Constitutional:       Appearance: Well-developed.   Eyes:      General: No scleral icterus.        Right eye: No discharge.   HENT:      Head: " Normocephalic and atraumatic.   Neck:      Thyroid: No thyromegaly.      Lymphadenopathy: No cervical adenopathy.   Pulmonary:      Effort: Pulmonary effort is normal. No respiratory distress.      Breath sounds: Normal breath sounds. No wheezing. No rales.   Cardiovascular:      Normal rate. Regular rhythm.      No gallop.    Edema:     Peripheral edema absent.   Abdominal:      Tenderness: There is no abdominal tenderness.   Skin:     Findings: No erythema or rash.   Neurological:      Mental Status: Alert and oriented to person, place, and time.             Assessment:       Diagnosis Plan   1. Coronary artery calcification  Stress Test With Myocardial Perfusion One Day      2. Obstructive sleep apnea syndrome  Stress Test With Myocardial Perfusion One Day      3. Mixed hyperlipidemia  Stress Test With Myocardial Perfusion One Day    rosuvastatin (Crestor) 10 MG tablet               Plan:       MDM:    1.  Coronary artery calcification:    Patient has coronary artery calcification.  I would proceed with stress test with Cardiolite imaging    2.  Obstructive sleep apnea:    Patient uses CPAP.    3.  Mixed hyperlipidemia:    I would recommend to increase Crestor to 10 mg daily.

## 2024-07-25 ENCOUNTER — TELEPHONE (OUTPATIENT)
Dept: FAMILY MEDICINE CLINIC | Facility: CLINIC | Age: 64
End: 2024-07-25
Payer: COMMERCIAL

## 2024-07-25 ENCOUNTER — OFFICE VISIT (OUTPATIENT)
Dept: CARDIOLOGY | Facility: CLINIC | Age: 64
End: 2024-07-25
Payer: MEDICARE

## 2024-07-25 ENCOUNTER — TELEPHONE (OUTPATIENT)
Dept: NEUROLOGY | Facility: CLINIC | Age: 64
End: 2024-07-25
Payer: COMMERCIAL

## 2024-07-25 VITALS
RESPIRATION RATE: 16 BRPM | BODY MASS INDEX: 27.72 KG/M2 | HEART RATE: 73 BPM | WEIGHT: 198 LBS | DIASTOLIC BLOOD PRESSURE: 95 MMHG | SYSTOLIC BLOOD PRESSURE: 148 MMHG | OXYGEN SATURATION: 94 % | HEIGHT: 71 IN

## 2024-07-25 DIAGNOSIS — G47.33 OBSTRUCTIVE SLEEP APNEA SYNDROME: ICD-10-CM

## 2024-07-25 DIAGNOSIS — E78.2 MIXED HYPERLIPIDEMIA: ICD-10-CM

## 2024-07-25 DIAGNOSIS — I25.10 CORONARY ARTERY CALCIFICATION: Primary | Chronic | ICD-10-CM

## 2024-07-25 DIAGNOSIS — I25.84 CORONARY ARTERY CALCIFICATION: Primary | Chronic | ICD-10-CM

## 2024-07-25 RX ORDER — ROSUVASTATIN CALCIUM 10 MG/1
10 TABLET, COATED ORAL DAILY
Qty: 90 TABLET | Refills: 1 | Status: SHIPPED | OUTPATIENT
Start: 2024-07-25

## 2024-07-25 NOTE — TELEPHONE ENCOUNTER
Caller: JULIETH    Relationship:  SANDI MENDOZA /[employer]    Best call back number: [phone]    Who are you requesting to speak with (clinical staff, provider,  specific staff member): CLINICAL    What was the call regarding: HE STATES A FAX WAS SENT ON 7-23-24 REQUESTING OV NOTES FOR REVIEW OF SUPPLIES     PLEASE FAX [phone]

## 2024-07-25 NOTE — TELEPHONE ENCOUNTER
Caller: [employer]    Relationship:     Best call back number:     [phone]       What was the call regarding:  PROGRESS NOTES OR CHART NOTES WERE SENT OVER     HAVE YOU RECEIVED IT YET ?        Is it okay if the provider responds through [employer]:

## 2024-08-01 DIAGNOSIS — R56.9 SEIZURES: ICD-10-CM

## 2024-08-01 RX ORDER — LEVETIRACETAM 750 MG/1
750 TABLET ORAL 2 TIMES DAILY
Qty: 60 TABLET | Refills: 0 | Status: SHIPPED | OUTPATIENT
Start: 2024-08-01

## 2024-08-06 ENCOUNTER — TELEPHONE (OUTPATIENT)
Dept: FAMILY MEDICINE CLINIC | Facility: CLINIC | Age: 64
End: 2024-08-06
Payer: MEDICARE

## 2024-08-06 NOTE — TELEPHONE ENCOUNTER
Caller: [employer]    Relationship:     Best call back number: [phone]     What is the best time to reach you: ANY    Who are you requesting to speak with (clinical staff, provider,  specific staff member): FRONT OFFICE    Do you know the name of the person who called:     What was the call regarding: WANTING TO KNOW IF YOU HAVE RECEIVED A FAX FOR MEDICAL SUPPLIES-CPAP    Is it okay if the provider responds through MyChart: PHONE

## 2024-08-29 ENCOUNTER — HOSPITAL ENCOUNTER (OUTPATIENT)
Dept: NUCLEAR MEDICINE | Facility: HOSPITAL | Age: 64
Discharge: HOME | End: 2024-08-29
Payer: MEDICARE

## 2024-08-29 DIAGNOSIS — I25.84 CORONARY ARTERY CALCIFICATION: Chronic | ICD-10-CM

## 2024-08-29 DIAGNOSIS — I25.10 CORONARY ARTERY CALCIFICATION: Chronic | ICD-10-CM

## 2024-08-29 DIAGNOSIS — E78.2 MIXED HYPERLIPIDEMIA: ICD-10-CM

## 2024-08-29 DIAGNOSIS — G47.33 OBSTRUCTIVE SLEEP APNEA SYNDROME: ICD-10-CM

## 2024-08-29 PROCEDURE — 0 TECHNETIUM TETROFOSMIN KIT: Performed by: INTERNAL MEDICINE

## 2024-08-29 PROCEDURE — A9502 TC99M TETROFOSMIN: HCPCS | Performed by: INTERNAL MEDICINE

## 2024-08-29 PROCEDURE — 93017 CV STRESS TEST TRACING ONLY: CPT

## 2024-08-29 PROCEDURE — 25010000002 REGADENOSON 0.4 MG/5ML SOLUTION: Performed by: INTERNAL MEDICINE

## 2024-08-29 PROCEDURE — 78452 HT MUSCLE IMAGE SPECT MULT: CPT

## 2024-08-29 RX ORDER — REGADENOSON 0.08 MG/ML
0.4 INJECTION, SOLUTION INTRAVENOUS
Status: COMPLETED | OUTPATIENT
Start: 2024-08-29 | End: 2024-08-29

## 2024-08-29 RX ADMIN — TETROFOSMIN 1 DOSE: 1.38 INJECTION, POWDER, LYOPHILIZED, FOR SOLUTION INTRAVENOUS at 10:35

## 2024-08-29 RX ADMIN — REGADENOSON 0.4 MG: 0.08 INJECTION, SOLUTION INTRAVENOUS at 10:35

## 2024-08-29 RX ADMIN — TETROFOSMIN 1 DOSE: 1.38 INJECTION, POWDER, LYOPHILIZED, FOR SOLUTION INTRAVENOUS at 09:46

## 2024-09-01 DIAGNOSIS — R56.9 SEIZURES: ICD-10-CM

## 2024-09-02 LAB
BH CV REST NUCLEAR ISOTOPE DOSE: 8.6 MCI
BH CV STRESS BP STAGE 1: NORMAL
BH CV STRESS BP STAGE 2: NORMAL
BH CV STRESS COMMENTS STAGE 1: NORMAL
BH CV STRESS COMMENTS STAGE 2: NORMAL
BH CV STRESS DOSE REGADENOSON STAGE 1: 0.4
BH CV STRESS DURATION MIN STAGE 1: 0
BH CV STRESS DURATION MIN STAGE 2: 4
BH CV STRESS DURATION SEC STAGE 1: 10
BH CV STRESS DURATION SEC STAGE 2: 0
BH CV STRESS HR STAGE 1: 75
BH CV STRESS HR STAGE 2: 98
BH CV STRESS NUCLEAR ISOTOPE DOSE: 28 MCI
BH CV STRESS PROTOCOL 1: NORMAL
BH CV STRESS RECOVERY BP: NORMAL MMHG
BH CV STRESS RECOVERY HR: 88 BPM
BH CV STRESS STAGE 1: 1
BH CV STRESS STAGE 2: 2
LV EF NUC BP: 64 %
MAXIMAL PREDICTED HEART RATE: 155 BPM
PERCENT MAX PREDICTED HR: 63.23 %
STRESS BASELINE BP: NORMAL MMHG
STRESS BASELINE HR: 54 BPM
STRESS PERCENT HR: 74 %
STRESS POST PEAK BP: NORMAL MMHG
STRESS POST PEAK HR: 98 BPM
STRESS TARGET HR: 132 BPM

## 2024-09-03 RX ORDER — LEVETIRACETAM 750 MG/1
750 TABLET ORAL 2 TIMES DAILY
Qty: 60 TABLET | Refills: 0 | Status: SHIPPED | OUTPATIENT
Start: 2024-09-03

## 2024-09-05 ENCOUNTER — TELEPHONE (OUTPATIENT)
Dept: CARDIOLOGY | Facility: CLINIC | Age: 64
End: 2024-09-05
Payer: MEDICARE

## 2024-09-05 NOTE — TELEPHONE ENCOUNTER
Name: Mook Bose JEFFERY      Relationship: Self      Best Callback Number: [phone]      HUB PROVIDED THE RELAY MESSAGE FROM THE OFFICE      PATIENT: HAS FURTHER QUESTIONS AND WOULD LIKE A CALL BACK AT THE FOLLOWING PHONE [phone]    ADDITIONAL INFORMATION:

## 2024-09-16 ENCOUNTER — OFFICE VISIT (OUTPATIENT)
Dept: NEUROLOGY | Facility: CLINIC | Age: 64
End: 2024-09-16
Payer: MEDICARE

## 2024-09-16 VITALS
WEIGHT: 202 LBS | BODY MASS INDEX: 28.28 KG/M2 | HEIGHT: 71 IN | SYSTOLIC BLOOD PRESSURE: 130 MMHG | HEART RATE: 83 BPM | DIASTOLIC BLOOD PRESSURE: 78 MMHG

## 2024-09-16 DIAGNOSIS — G47.33 OBSTRUCTIVE SLEEP APNEA: ICD-10-CM

## 2024-09-16 DIAGNOSIS — R41.3 MEMORY LOSS: Primary | ICD-10-CM

## 2024-09-16 PROCEDURE — 1160F RVW MEDS BY RX/DR IN RCRD: CPT | Performed by: PSYCHIATRY & NEUROLOGY

## 2024-09-16 PROCEDURE — 1159F MED LIST DOCD IN RCRD: CPT | Performed by: PSYCHIATRY & NEUROLOGY

## 2024-09-16 PROCEDURE — 99215 OFFICE O/P EST HI 40 MIN: CPT | Performed by: PSYCHIATRY & NEUROLOGY

## 2024-09-18 LAB
ALBUMIN SERPL-MCNC: 4.4 G/DL (ref 3.9–4.9)
ALP SERPL-CCNC: 74 IU/L (ref 44–121)
ALT SERPL-CCNC: 9 IU/L (ref 0–44)
AST SERPL-CCNC: 12 IU/L (ref 0–40)
BILIRUB SERPL-MCNC: 0.5 MG/DL (ref 0–1.2)
BUN SERPL-MCNC: 14 MG/DL (ref 8–27)
BUN/CREAT SERPL: 17 (ref 10–24)
CALCIUM SERPL-MCNC: 9.4 MG/DL (ref 8.6–10.2)
CHLORIDE SERPL-SCNC: 100 MMOL/L (ref 96–106)
CHOLEST SERPL-MCNC: 85 MG/DL (ref 100–199)
CHOLEST/HDLC SERPL: 2.4 RATIO (ref 0–5)
CO2 SERPL-SCNC: 24 MMOL/L (ref 20–29)
CREAT SERPL-MCNC: 0.83 MG/DL (ref 0.76–1.27)
EGFRCR SERPLBLD CKD-EPI 2021: 97 ML/MIN/1.73
GLOBULIN SER CALC-MCNC: 2.7 G/DL (ref 1.5–4.5)
GLUCOSE SERPL-MCNC: 89 MG/DL (ref 70–99)
HDLC SERPL-MCNC: 35 MG/DL
LDLC SERPL CALC-MCNC: 36 MG/DL (ref 0–99)
POTASSIUM SERPL-SCNC: 4.3 MMOL/L (ref 3.5–5.2)
PROT SERPL-MCNC: 7.1 G/DL (ref 6–8.5)
SODIUM SERPL-SCNC: 140 MMOL/L (ref 134–144)
TRIGL SERPL-MCNC: 62 MG/DL (ref 0–149)
VLDLC SERPL CALC-MCNC: 14 MG/DL (ref 5–40)

## 2024-09-23 ENCOUNTER — OFFICE VISIT (OUTPATIENT)
Dept: FAMILY MEDICINE CLINIC | Facility: CLINIC | Age: 64
End: 2024-09-23
Payer: MEDICARE

## 2024-09-23 VITALS
OXYGEN SATURATION: 100 % | WEIGHT: 206 LBS | BODY MASS INDEX: 28.84 KG/M2 | TEMPERATURE: 97.5 F | HEIGHT: 71 IN | HEART RATE: 75 BPM | DIASTOLIC BLOOD PRESSURE: 82 MMHG | RESPIRATION RATE: 18 BRPM | SYSTOLIC BLOOD PRESSURE: 130 MMHG

## 2024-09-23 DIAGNOSIS — S06.5XAA SUBDURAL HEMATOMA: ICD-10-CM

## 2024-09-23 DIAGNOSIS — E04.1 THYROID NODULE: ICD-10-CM

## 2024-09-23 DIAGNOSIS — E66.3 OVERWEIGHT (BMI 25.0-29.9): ICD-10-CM

## 2024-09-23 DIAGNOSIS — R93.1 HIGH CORONARY ARTERY CALCIUM SCORE: ICD-10-CM

## 2024-09-23 DIAGNOSIS — F02.A4 MILD EARLY ONSET ALZHEIMER'S DEMENTIA WITH ANXIETY: ICD-10-CM

## 2024-09-23 DIAGNOSIS — E78.2 MIXED HYPERLIPIDEMIA: Primary | ICD-10-CM

## 2024-09-23 DIAGNOSIS — G30.0 MILD EARLY ONSET ALZHEIMER'S DEMENTIA WITH ANXIETY: ICD-10-CM

## 2024-09-23 DIAGNOSIS — R41.3 MEMORY LOSS: ICD-10-CM

## 2024-09-23 PROCEDURE — 1126F AMNT PAIN NOTED NONE PRSNT: CPT | Performed by: FAMILY MEDICINE

## 2024-09-23 PROCEDURE — 99214 OFFICE O/P EST MOD 30 MIN: CPT | Performed by: FAMILY MEDICINE

## 2024-09-23 PROCEDURE — G2211 COMPLEX E/M VISIT ADD ON: HCPCS | Performed by: FAMILY MEDICINE

## 2024-09-23 PROCEDURE — 1159F MED LIST DOCD IN RCRD: CPT | Performed by: FAMILY MEDICINE

## 2024-09-23 PROCEDURE — 1160F RVW MEDS BY RX/DR IN RCRD: CPT | Performed by: FAMILY MEDICINE

## 2024-09-23 RX ORDER — DONEPEZIL HYDROCHLORIDE 10 MG/1
10 TABLET, FILM COATED ORAL NIGHTLY
Start: 2024-09-23

## 2024-09-23 RX ORDER — ROSUVASTATIN CALCIUM 10 MG/1
10 TABLET, COATED ORAL DAILY
Start: 2024-09-23

## 2024-09-23 RX ORDER — CHLORAL HYDRATE 500 MG
1000 CAPSULE ORAL
Start: 2024-09-23

## 2024-09-27 DIAGNOSIS — R56.9 SEIZURES: ICD-10-CM

## 2024-09-27 RX ORDER — LEVETIRACETAM 750 MG/1
750 TABLET ORAL 2 TIMES DAILY
Qty: 60 TABLET | Refills: 0 | Status: SHIPPED | OUTPATIENT
Start: 2024-09-27

## 2024-10-25 DIAGNOSIS — R56.9 SEIZURES: ICD-10-CM

## 2024-10-25 RX ORDER — LEVETIRACETAM 750 MG/1
750 TABLET ORAL 2 TIMES DAILY
Qty: 60 TABLET | Refills: 0 | Status: SHIPPED | OUTPATIENT
Start: 2024-10-25

## 2024-11-13 NOTE — PROGRESS NOTES
"Chief Complaint  Follow-up (TAMMY AND SEIZURES)    Subjective          Mook Bose presents to [facility] NEUROLOGY  History of Present Illness  F/u on TAMMY, patient states he is benefiting from pap therapy, he uses [facility] and get supplies from [employer].     Seizures- no recent seizures,patient currently taking keppra 750 mg 1 bid  Memory unchanged , aricept 10mg per day     Sleep testing history:    On NPSG at [facility] , 6/28/2021 patient had  obstructive sleep apnea syndrome with apnea-hypopnea index of 24.9 per sleep hour, minimum SpO2 of 72%    PAP download:  The patient is on CPAP therapy at 7-16 cm/H2O.   Data indicates Excellent compliance. With 86% usage for more than 4 hours with an average usage of 7 hours 58 minutes. AHI down to 0.9 .  Average pressures 7.6.  Average large leak 103l/m.     The patient's hypersomnia has resolved       [place] Sleepiness Scale:  Sitting and reading JS [place] Sleepiness: 1 WatchingTV JS [place] Sleepiness: 2  Sitting, inactive, in a public place JS [place] Sleepiness: 1  As a passenger in a car for 1 hour w/o a break  JS [place] Sleepiness: 2  Lying down to rest in the afternoon  JS [place] Sleepiness: 2  Sitting and talking to someone  JS [place] Sleepiness: 0  Sitting quietly after a lunch  JS [place] Sleepiness: 1  In a car, while stopped for traffic or a light  JS [place] Sleepiness: 1  Total 10    Review of Systems   Constitutional:  Negative for fatigue.   Respiratory:  Negative for shortness of breath.    Cardiovascular:  Negative for chest pain.   Neurological:  Negative for seizures.   Psychiatric/Behavioral:  Negative for sleep disturbance.          Objective   Vital Signs:   /83   Pulse 72   Resp 16   Ht 179.1 cm (70.5\")   Wt 92.1 kg (203 lb)   BMI 28.72 kg/m²     Physical Exam  Vitals reviewed.   Cardiovascular:      Rate and Rhythm: Normal rate.   Pulmonary:      Effort: Pulmonary effort is normal.   Neurological:      General: No focal " deficit present.      Mental Status: He is alert and oriented to person, place, and time.   Psychiatric:         Mood and Affect: Mood normal.        Result Review :                 Assessment and Plan    Diagnoses and all orders for this visit:    1. Obstructive sleep apnea syndrome (Primary)  Overview:  Dr. Seiple      2. Seizures  Overview:  Last seizure was 10-1-2023  Followed with Dr. Seiple  EEG done 10-19-23,    Orders:  -     levETIRAcetam (KEPPRA) 750 MG tablet; Take 1 tablet by mouth 2 (Two) Times a Day.  Dispense: 180 tablet; Refill: 3      Continue CPAP at current pressure  Continue Aricept for memory  Continue Keppra for seizure do   The patient is compliant with and benefiting from PAP therapy.      Follow Up   Return in about 1 year (around 11/14/2025).    Patient was given instructions and counseling regarding his condition or for health maintenance advice. Please see specific information pulled into the AVS if appropriate.       This document has been electronically signed by Joseph Seipel, MD on November 14, 2024 11:50 EST

## 2024-11-14 ENCOUNTER — OFFICE VISIT (OUTPATIENT)
Dept: NEUROLOGY | Facility: CLINIC | Age: 64
End: 2024-11-14
Payer: MEDICARE

## 2024-11-14 VITALS
BODY MASS INDEX: 28.42 KG/M2 | SYSTOLIC BLOOD PRESSURE: 127 MMHG | HEIGHT: 71 IN | HEART RATE: 72 BPM | WEIGHT: 203 LBS | DIASTOLIC BLOOD PRESSURE: 83 MMHG | RESPIRATION RATE: 16 BRPM

## 2024-11-14 DIAGNOSIS — R56.9 SEIZURES: ICD-10-CM

## 2024-11-14 DIAGNOSIS — G47.33 OBSTRUCTIVE SLEEP APNEA SYNDROME: Primary | ICD-10-CM

## 2024-11-14 PROCEDURE — 1159F MED LIST DOCD IN RCRD: CPT | Performed by: PSYCHIATRY & NEUROLOGY

## 2024-11-14 PROCEDURE — 1160F RVW MEDS BY RX/DR IN RCRD: CPT | Performed by: PSYCHIATRY & NEUROLOGY

## 2024-11-14 PROCEDURE — 99214 OFFICE O/P EST MOD 30 MIN: CPT | Performed by: PSYCHIATRY & NEUROLOGY

## 2024-11-14 RX ORDER — LEVETIRACETAM 750 MG/1
750 TABLET ORAL 2 TIMES DAILY
Qty: 180 TABLET | Refills: 3 | Status: SHIPPED | OUTPATIENT
Start: 2024-11-14

## 2024-12-04 ENCOUNTER — OFFICE VISIT (OUTPATIENT)
Dept: FAMILY MEDICINE CLINIC | Facility: CLINIC | Age: 64
End: 2024-12-04
Payer: MEDICARE

## 2024-12-04 ENCOUNTER — TELEPHONE (OUTPATIENT)
Dept: FAMILY MEDICINE CLINIC | Facility: CLINIC | Age: 64
End: 2024-12-04

## 2024-12-04 VITALS
OXYGEN SATURATION: 97 % | WEIGHT: 204.8 LBS | SYSTOLIC BLOOD PRESSURE: 120 MMHG | BODY MASS INDEX: 40.21 KG/M2 | HEIGHT: 60 IN | RESPIRATION RATE: 18 BRPM | TEMPERATURE: 96.9 F | DIASTOLIC BLOOD PRESSURE: 78 MMHG | HEART RATE: 80 BPM

## 2024-12-04 DIAGNOSIS — F32.A DEPRESSION, UNSPECIFIED DEPRESSION TYPE: Primary | ICD-10-CM

## 2024-12-04 DIAGNOSIS — Z23 NEEDS FLU SHOT: ICD-10-CM

## 2024-12-04 PROCEDURE — 1160F RVW MEDS BY RX/DR IN RCRD: CPT | Performed by: FAMILY MEDICINE

## 2024-12-04 PROCEDURE — 90662 IIV NO PRSV INCREASED AG IM: CPT | Performed by: FAMILY MEDICINE

## 2024-12-04 PROCEDURE — 1159F MED LIST DOCD IN RCRD: CPT | Performed by: FAMILY MEDICINE

## 2024-12-04 PROCEDURE — 99214 OFFICE O/P EST MOD 30 MIN: CPT | Performed by: FAMILY MEDICINE

## 2024-12-04 PROCEDURE — 1126F AMNT PAIN NOTED NONE PRSNT: CPT | Performed by: FAMILY MEDICINE

## 2024-12-04 PROCEDURE — G0008 ADMIN INFLUENZA VIRUS VAC: HCPCS | Performed by: FAMILY MEDICINE

## 2024-12-04 RX ORDER — PAROXETINE 10 MG/1
10 TABLET, FILM COATED ORAL EVERY MORNING
Qty: 90 TABLET | Refills: 1 | Status: SHIPPED | OUTPATIENT
Start: 2024-12-04

## 2024-12-04 NOTE — PROGRESS NOTES
Subjective   Mook Bose is a 64 y.o. male.   Chief Complaint   Patient presents with    Depression     Depression  Presents for follow-up visit. Symptoms include depressed mood. Symptoms occur constantly.   The quality of sleep is poor. His past medical history is significant for depression.   Additional comments: Patient stated that depression return this morning due to the cold weather        The following portions of the patient's history were reviewed and updated as appropriate: allergies, current medications, past family history, past medical history, past social history, past surgical history, and problem list.    Past Medical History:   Diagnosis Date    Hyperlipidemia     Seizures        Past Surgical History:   Procedure Laterality Date    CRANIOTOMY  2009    VASECTOMY          Family History   Problem Relation Age of Onset    Alzheimer's disease Mother 62    Cancer Father         Unknown    Alzheimer's disease Sister 70    Heart disease Brother         dx at 59 living at 75    No Known Problems Daughter     No Known Problems Son     Heart disease Maternal Grandfather         MI at 44,  at 78    Stroke Maternal Grandfather     Hypertension Brother     Cancer Brother         dx at 49, living at 66    Other Brother         HEP. C        Social History     Socioeconomic History    Marital status:    Tobacco Use    Smoking status: Former     Current packs/day: 0.00     Average packs/day: 1 pack/day for 26.0 years (26.0 ttl pk-yrs)     Types: Cigars, Cigarettes     Start date: 1974     Quit date:      Years since quittin.9     Passive exposure: Past    Smokeless tobacco: Former     Types: Chew   Vaping Use    Vaping status: Never Used   Substance and Sexual Activity    Alcohol use: No    Drug use: No    Sexual activity: Defer       Outpatient Medications Prior to Visit   Medication Sig Dispense Refill    aspirin 81 MG tablet Take 1 tablet by mouth Daily.       "donepezil (ARICEPT) 10 MG tablet Take 1 tablet by mouth Every Night.      levETIRAcetam (KEPPRA) 750 MG tablet Take 1 tablet by mouth 2 (Two) Times a Day. 180 tablet 3    melatonin 5 MG tablet tablet Take  by mouth.      Omega-3 Fatty Acids (fish oil) 1000 MG capsule capsule Take 1 capsule by mouth Daily With Breakfast.      rosuvastatin (Crestor) 10 MG tablet Take 1 tablet by mouth Daily.      vitamin C (ASCORBIC ACID) 250 MG tablet Take 1 tablet by mouth Daily.      vitamin E 400 UNIT capsule Take 1 capsule by mouth Daily.       No facility-administered medications prior to visit.        Review of Systems   Constitutional:  Positive for appetite change (decreased) and fatigue (decreased energy). Negative for chills, diaphoresis and fever.   Psychiatric/Behavioral:  Positive for depressed mood.        Objective   Visit Vitals  /78 (BP Location: Left arm, Patient Position: Sitting, Cuff Size: Adult)   Pulse 80   Temp 96.9 °F (36.1 °C) (Temporal)   Resp 18   Ht 70.5 cm (27.76\")   Wt 92.9 kg (204 lb 12.8 oz)   SpO2 97%   .90 kg/m²     Physical Exam  Vitals and nursing note reviewed.   Constitutional:       Appearance: He is well-developed.   HENT:      Head: Normocephalic.   Neck:      Thyroid: No thyromegaly.      Vascular: No carotid bruit.      Trachea: Trachea normal.   Cardiovascular:      Rate and Rhythm: Normal rate and regular rhythm.      Heart sounds: No murmur heard.     No friction rub. No gallop.   Pulmonary:      Effort: Pulmonary effort is normal. No respiratory distress.      Breath sounds: Normal breath sounds. No wheezing.   Chest:      Chest wall: No tenderness.   Musculoskeletal:      Cervical back: Neck supple.   Skin:     General: Skin is dry.      Findings: No rash.      Nails: There is no clubbing.   Neurological:      Mental Status: He is alert and oriented to person, place, and time.   Psychiatric:         Behavior: Behavior is cooperative.         Assessment & Plan   Problem " List Items Addressed This Visit          High    Current severe episode of major depressive disorder without psychotic features - Primary    Current Assessment & Plan     Worse.  Patient restarted on Paxil 10 mg daily.  Patient has had suicidal ideation, but has promised not to commit suicide before returning on 12-19-24         Relevant Medications    PARoxetine (Paxil) 10 MG tablet     Other Visit Diagnoses       Needs flu shot        Relevant Orders    Fluzone High-Dose 65+yrs (7239-1730) (Completed)

## 2024-12-04 NOTE — ASSESSMENT & PLAN NOTE
Worse.  Patient restarted on Paxil 10 mg daily.  Patient has had suicidal ideation, but has promised not to commit suicide before returning on 12-19-24

## 2024-12-04 NOTE — TELEPHONE ENCOUNTER
Caller: Mook Bose    Relationship: Self    Best call back number:     [phone]       What is the best time to reach you: ANY    Who are you requesting to speak with (clinical staff, provider,  specific staff member): PCP    Do you know the name of the person who called:     What was the call regarding: PATIENT WOULD LIKE FOR PCP TO GIVE HIM A CALL BACK.    Is it okay if the provider responds through MyChart:

## 2024-12-08 PROBLEM — F32.2 CURRENT SEVERE EPISODE OF MAJOR DEPRESSIVE DISORDER WITHOUT PSYCHOTIC FEATURES: Status: ACTIVE | Noted: 2023-02-22

## 2024-12-13 LAB
ALBUMIN SERPL-MCNC: 4.1 G/DL (ref 3.9–4.9)
ALP SERPL-CCNC: 75 IU/L (ref 44–121)
ALT SERPL-CCNC: 11 IU/L (ref 0–44)
AST SERPL-CCNC: 11 IU/L (ref 0–40)
BILIRUB SERPL-MCNC: 0.4 MG/DL (ref 0–1.2)
BUN SERPL-MCNC: 15 MG/DL (ref 8–27)
BUN/CREAT SERPL: 18 (ref 10–24)
CALCIUM SERPL-MCNC: 9.5 MG/DL (ref 8.6–10.2)
CHLORIDE SERPL-SCNC: 102 MMOL/L (ref 96–106)
CHOLEST SERPL-MCNC: 98 MG/DL (ref 100–199)
CHOLEST/HDLC SERPL: 2.9 RATIO (ref 0–5)
CO2 SERPL-SCNC: 28 MMOL/L (ref 20–29)
CREAT SERPL-MCNC: 0.83 MG/DL (ref 0.76–1.27)
EGFRCR SERPLBLD CKD-EPI 2021: 97 ML/MIN/1.73
GLOBULIN SER CALC-MCNC: 2.9 G/DL (ref 1.5–4.5)
GLUCOSE SERPL-MCNC: 82 MG/DL (ref 70–99)
HDLC SERPL-MCNC: 34 MG/DL
LDLC SERPL CALC-MCNC: 48 MG/DL (ref 0–99)
POTASSIUM SERPL-SCNC: 4.1 MMOL/L (ref 3.5–5.2)
PROT SERPL-MCNC: 7 G/DL (ref 6–8.5)
SODIUM SERPL-SCNC: 140 MMOL/L (ref 134–144)
TRIGL SERPL-MCNC: 76 MG/DL (ref 0–149)
VLDLC SERPL CALC-MCNC: 16 MG/DL (ref 5–40)

## 2024-12-19 ENCOUNTER — OFFICE VISIT (OUTPATIENT)
Dept: FAMILY MEDICINE CLINIC | Facility: CLINIC | Age: 64
End: 2024-12-19
Payer: MEDICARE

## 2024-12-19 VITALS
SYSTOLIC BLOOD PRESSURE: 118 MMHG | HEART RATE: 74 BPM | DIASTOLIC BLOOD PRESSURE: 74 MMHG | RESPIRATION RATE: 18 BRPM | HEIGHT: 71 IN | TEMPERATURE: 97.5 F | WEIGHT: 203.4 LBS | OXYGEN SATURATION: 97 % | BODY MASS INDEX: 28.48 KG/M2

## 2024-12-19 DIAGNOSIS — E78.2 MIXED HYPERLIPIDEMIA: Primary | ICD-10-CM

## 2024-12-19 DIAGNOSIS — F32.2 CURRENT SEVERE EPISODE OF MAJOR DEPRESSIVE DISORDER WITHOUT PSYCHOTIC FEATURES, UNSPECIFIED WHETHER RECURRENT: ICD-10-CM

## 2024-12-19 PROCEDURE — 1160F RVW MEDS BY RX/DR IN RCRD: CPT | Performed by: FAMILY MEDICINE

## 2024-12-19 PROCEDURE — 1126F AMNT PAIN NOTED NONE PRSNT: CPT | Performed by: FAMILY MEDICINE

## 2024-12-19 PROCEDURE — 1159F MED LIST DOCD IN RCRD: CPT | Performed by: FAMILY MEDICINE

## 2024-12-19 PROCEDURE — 99214 OFFICE O/P EST MOD 30 MIN: CPT | Performed by: FAMILY MEDICINE

## 2024-12-19 PROCEDURE — G2211 COMPLEX E/M VISIT ADD ON: HCPCS | Performed by: FAMILY MEDICINE

## 2024-12-19 NOTE — ASSESSMENT & PLAN NOTE
Lipid and CMP done 12-, read by me, reviewed with pt.  Trig. 76 up from 62, Tot. Chol. 98 up from 85, HDL 34 down from 35, LDL 48 up from 36  Doing well.  Patient tolerated Crestor well without side effects. I feel the benefits of the medication outweigh the risks.   Encouraged to watch fatty intake, exercise more, and lose weight.   compliant with medication   Is not getting adequate diet and exercise  Goals developed at last visit were not met but he is close to goal  Follow up in  3 months  Care management needs are self-addressed. Self-management abilities addressed and patient is capable of managing his own disease.

## 2024-12-19 NOTE — ASSESSMENT & PLAN NOTE
Greatly Improved.  Patient tolerated Paxil well without side effects. I feel the benefits of the medication outweigh the risks.

## 2024-12-19 NOTE — PROGRESS NOTES
Subjective   Mook Bose is a 64 y.o. male.   Chief Complaint   Patient presents with    Hyperlipidemia    Depression     Hyperlipidemia  This is a chronic problem. The current episode started more than 1 year ago. The problem is controlled. Factors aggravating his hyperlipidemia include fatty foods. Pertinent negatives include no myalgias. Current antihyperlipidemic treatment includes statins. The current treatment provides significant improvement of lipids.   Depression  Presents for follow-up visit. Symptoms include depressed mood. Patient is not experiencing: weight gain, weight loss and nausea.Symptoms occur most days.   The quality of sleep is good. His past medical history is significant for depression. The treatment provides significant relief. Compliance with medications is %.        The following portions of the patient's history were reviewed and updated as appropriate: allergies, current medications, past family history, past medical history, past social history, past surgical history, and problem list.    Past Medical History:   Diagnosis Date    Hyperlipidemia     Seizures        Past Surgical History:   Procedure Laterality Date    CRANIOTOMY  2009    VASECTOMY          Family History   Problem Relation Age of Onset    Alzheimer's disease Mother 62    Cancer Father         Unknown    Alzheimer's disease Sister 70    Heart disease Brother         dx at 59 living at 75    No Known Problems Daughter     No Known Problems Son     Heart disease Maternal Grandfather         MI at 44,  at 78    Stroke Maternal Grandfather     Hypertension Brother     Cancer Brother         dx at 49, living at 66    Other Brother         HEP. C        Social History     Socioeconomic History    Marital status:    Tobacco Use    Smoking status: Former     Current packs/day: 0.00     Average packs/day: 1 pack/day for 26.0 years (26.0 ttl pk-yrs)     Types: Cigars, Cigarettes     Start date:  "1974     Quit date:      Years since quittin.9     Passive exposure: Past    Smokeless tobacco: Former     Types: Chew   Vaping Use    Vaping status: Never Used   Substance and Sexual Activity    Alcohol use: No    Drug use: No    Sexual activity: Defer       Outpatient Medications Prior to Visit   Medication Sig Dispense Refill    aspirin 81 MG tablet Take 1 tablet by mouth Daily.      donepezil (ARICEPT) 10 MG tablet Take 1 tablet by mouth Every Night.      levETIRAcetam (KEPPRA) 750 MG tablet Take 1 tablet by mouth 2 (Two) Times a Day. 180 tablet 3    melatonin 5 MG tablet tablet Take  by mouth.      Omega-3 Fatty Acids (fish oil) 1000 MG capsule capsule Take 1 capsule by mouth Daily With Breakfast.      PARoxetine (Paxil) 10 MG tablet Take 1 tablet by mouth Every Morning. 90 tablet 1    rosuvastatin (Crestor) 10 MG tablet Take 1 tablet by mouth Daily.      vitamin C (ASCORBIC ACID) 250 MG tablet Take 1 tablet by mouth Daily.      vitamin E 400 UNIT capsule Take 1 capsule by mouth Daily.       No facility-administered medications prior to visit.        Review of Systems   Constitutional:  Negative for unexpected weight gain and unexpected weight loss.   Gastrointestinal:  Negative for nausea.   Musculoskeletal:  Negative for myalgias.   Skin:  Negative for dry skin.   Psychiatric/Behavioral:  Positive for depressed mood.        Objective   Visit Vitals  /74 (BP Location: Left arm, Patient Position: Sitting, Cuff Size: Adult)   Pulse 74   Temp 97.5 °F (36.4 °C) (Temporal)   Resp 18   Ht 179.1 cm (70.5\")   Wt 92.3 kg (203 lb 6.4 oz)   SpO2 97%   BMI 28.77 kg/m²     Physical Exam  Vitals and nursing note reviewed.   Constitutional:       Appearance: He is well-developed.   HENT:      Head: Normocephalic.   Neck:      Thyroid: No thyromegaly.      Vascular: No carotid bruit.      Trachea: Trachea normal.   Cardiovascular:      Rate and Rhythm: Normal rate and regular rhythm.      Heart sounds: No " murmur heard.     No friction rub. No gallop.   Pulmonary:      Effort: Pulmonary effort is normal. No respiratory distress.      Breath sounds: Normal breath sounds. No wheezing.   Chest:      Chest wall: No tenderness.   Musculoskeletal:      Cervical back: Neck supple.   Skin:     General: Skin is dry.      Findings: No rash.      Nails: There is no clubbing.   Neurological:      Mental Status: He is alert and oriented to person, place, and time.   Psychiatric:         Behavior: Behavior is cooperative.         Assessment & Plan   Problem List Items Addressed This Visit          High    Current severe episode of major depressive disorder without psychotic features    Current Assessment & Plan     Greatly Improved.  Patient tolerated Paxil well without side effects. I feel the benefits of the medication outweigh the risks.             Medium    Hyperlipidemia - Primary    Overview     7-5-24- calcium score 819         Current Assessment & Plan     Lipid and CMP done 12-, read by me, reviewed with pt.  Trig. 76 up from 62, Tot. Chol. 98 up from 85, HDL 34 down from 35, LDL 48 up from 36  Doing well.  Patient tolerated Crestor well without side effects. I feel the benefits of the medication outweigh the risks.   Encouraged to watch fatty intake, exercise more, and lose weight.   compliant with medication   Is not getting adequate diet and exercise  Goals developed at last visit were not met but he is close to goal  Follow up in  3 months  Care management needs are self-addressed. Self-management abilities addressed and patient is capable of managing his own disease.

## 2024-12-30 ENCOUNTER — TELEPHONE (OUTPATIENT)
Dept: NEUROLOGY | Facility: CLINIC | Age: 64
End: 2024-12-30

## 2024-12-30 ENCOUNTER — TELEPHONE (OUTPATIENT)
Dept: NEUROLOGY | Facility: CLINIC | Age: 64
End: 2024-12-30
Payer: MEDICARE

## 2024-12-30 NOTE — TELEPHONE ENCOUNTER
Caller: Mook Bose    Relationship to patient: Self    Best call back number: [phone]    Chief complaint: CDL PHYSICAL    Type of visit: FOLLOW UP    Requested date: BEFORE 1/12/25   Additional notes:PT NEEDS TO COME IN FOR HIS CDL PHYSICAL AND ITS DUE ON 1/12/25

## 2025-01-08 ENCOUNTER — CLINICAL SUPPORT (OUTPATIENT)
Dept: FAMILY MEDICINE CLINIC | Facility: CLINIC | Age: 65
End: 2025-01-08

## 2025-01-08 VITALS
WEIGHT: 205.8 LBS | RESPIRATION RATE: 18 BRPM | DIASTOLIC BLOOD PRESSURE: 74 MMHG | BODY MASS INDEX: 28.81 KG/M2 | HEIGHT: 71 IN | TEMPERATURE: 98.6 F | OXYGEN SATURATION: 97 % | SYSTOLIC BLOOD PRESSURE: 126 MMHG | HEART RATE: 66 BPM

## 2025-01-08 DIAGNOSIS — E66.3 OVERWEIGHT (BMI 25.0-29.9): ICD-10-CM

## 2025-01-08 DIAGNOSIS — Z02.4 ENCOUNTER FOR COMMERCIAL DRIVING LICENSE (CDL) EXAM: Primary | ICD-10-CM

## 2025-01-08 PROCEDURE — 81002 URINALYSIS NONAUTO W/O SCOPE: CPT | Performed by: FAMILY MEDICINE

## 2025-01-08 PROCEDURE — DOTPHY: Performed by: FAMILY MEDICINE

## 2025-01-08 NOTE — PROGRESS NOTES
Medical Examination      Subjective     Mook Bose is a 64 y.o. male who presents today for a  fitness determination physical exam. The patient reports no problems.  The following portions of the patient's history were reviewed and updated as appropriate: allergies, current medications, past family history, past medical history, past social history, past surgical history, and problem list.    I personally reviewed and updated the patient's allergies, medications, problem list, and past medical, surgical, social, and family history. I have reviewed and confirmed the accuracy of the History of Present Illness and Review of Symptoms as documented by the MA/BRENDANN/RN. Erlinda Eastman MA        Family History   Problem Relation Age of Onset    Alzheimer's disease Mother 62    Cancer Father         Unknown    Alzheimer's disease Sister 70    Heart disease Brother         dx at 59 living at 75    No Known Problems Daughter     No Known Problems Son     Heart disease Maternal Grandfather         MI at 44,  at 78    Stroke Maternal Grandfather     Hypertension Brother     Cancer Brother         dx at 49, living at 66    Other Brother         HEP. C       Social History     Tobacco Use    Smoking status: Former     Current packs/day: 0.00     Average packs/day: 1 pack/day for 26.0 years (26.0 ttl pk-yrs)     Types: Cigars, Cigarettes     Start date: 1974     Quit date:      Years since quittin.0     Passive exposure: Past    Smokeless tobacco: Former     Types: Chew   Vaping Use    Vaping status: Never Used   Substance Use Topics    Alcohol use: No    Drug use: No       Past Surgical History:   Procedure Laterality Date    CRANIOTOMY  2009    VASECTOMY  1992       Patient Active Problem List   Diagnosis    Seizures    Closed nondisplaced fracture of sixth cervical vertebra    Subdural hematoma    Overweight (BMI 25.0-29.9)    History of tobacco use    Hearing loss     Screening PSA (prostate specific antigen)    Hyperlipidemia    Encounter for general adult medical examination with abnormal findings    Deviated septum    Carpal tunnel syndrome of right wrist    Cervical disc disease    Hyperplastic colon polyp    Ganglion cyst    Family history of coronary artery disease    Family history of diabetes mellitus    Obstructive sleep apnea syndrome    Current severe episode of major depressive disorder without psychotic features    Adenomatous colon polyp    Pleurisy    Mild early onset Alzheimer's dementia    Nocturia    Left upper quadrant pain    Back pain with radiculopathy    High coronary artery calcium score    Memory loss    Thyroid nodule         Current Outpatient Medications:     aspirin 81 MG tablet, Take 1 tablet by mouth Daily., Disp: , Rfl:     donepezil (ARICEPT) 10 MG tablet, Take 1 tablet by mouth Every Night., Disp: , Rfl:     levETIRAcetam (KEPPRA) 750 MG tablet, Take 1 tablet by mouth 2 (Two) Times a Day., Disp: 180 tablet, Rfl: 3    melatonin 5 MG tablet tablet, Take  by mouth., Disp: , Rfl:     Omega-3 Fatty Acids (fish oil) 1000 MG capsule capsule, Take 1 capsule by mouth Daily With Breakfast., Disp: , Rfl:     PARoxetine (Paxil) 10 MG tablet, Take 1 tablet by mouth Every Morning., Disp: 90 tablet, Rfl: 1    rosuvastatin (Crestor) 10 MG tablet, Take 1 tablet by mouth Daily., Disp: , Rfl:     vitamin C (ASCORBIC ACID) 250 MG tablet, Take 1 tablet by mouth Daily., Disp: , Rfl:     vitamin E 400 UNIT capsule, Take 1 capsule by mouth Daily., Disp: , Rfl:          Review of Systems   Constitutional:  Negative for chills and diaphoresis.   Eyes:  Negative for visual disturbance.   Respiratory:  Negative for shortness of breath.    Cardiovascular:  Negative for chest pain and palpitations.   Gastrointestinal:  Negative for abdominal pain and nausea.   Endocrine: Negative for polydipsia and polyphagia.   Musculoskeletal:  Negative for neck stiffness.   Skin:   "Negative for color change and pallor.   Neurological:  Negative for seizures and syncope.   Hematological:  Negative for adenopathy.       I have reviewed and confirmed the accuracy of the ROS as documented by the MA/LPN/RN Erlinda Eastman MA      Objective   /74 (BP Location: Right arm, Patient Position: Sitting, Cuff Size: Adult)   Pulse 66   Temp 98.6 °F (37 °C) (Temporal)   Resp 18   Ht 179.1 cm (70.51\")   Wt 93.4 kg (205 lb 12.8 oz)   SpO2 97%   BMI 29.10 kg/m²   Wt Readings from Last 3 Encounters:   01/08/25 93.4 kg (205 lb 12.8 oz)   12/19/24 92.3 kg (203 lb 6.4 oz)   12/04/24 92.9 kg (204 lb 12.8 oz)       Vision:   Uncorrected Corrected Horizontal Field of Vision   Right Eye 20/20  >85 degrees   Left Eye  20/20  >85 degrees   Both Eyes  20/20       Applicant can recognize and distinguish among traffic control signals and devices showing standard red, green, and talya colors.         Monocular Vision?: No    Physical Exam  Constitutional:       Appearance: Normal appearance. He is well-developed. He is not diaphoretic.   HENT:      Head: Normocephalic and atraumatic.      Right Ear: Tympanic membrane, ear canal and external ear normal.      Left Ear: Tympanic membrane, ear canal and external ear normal.      Nose: Nose normal.      Mouth/Throat:      Mouth: Mucous membranes are moist.   Eyes:      General: Lids are normal.      Extraocular Movements: Extraocular movements intact.      Conjunctiva/sclera: Conjunctivae normal.      Pupils: Pupils are equal, round, and reactive to light.   Neck:      Thyroid: No thyromegaly.      Vascular: No carotid bruit or JVD.      Trachea: No tracheal deviation.   Cardiovascular:      Rate and Rhythm: Normal rate and regular rhythm.      Heart sounds: Normal heart sounds. No murmur heard.     No friction rub. No gallop.   Pulmonary:      Effort: Pulmonary effort is normal.      Breath sounds: Normal breath sounds. No stridor. No decreased breath sounds, wheezing " or rales.   Abdominal:      General: Bowel sounds are normal. There is no distension.      Palpations: Abdomen is soft. There is no mass.      Tenderness: There is no abdominal tenderness. There is no guarding or rebound.      Hernia: No hernia is present. There is no hernia in the left inguinal area.   Genitourinary:     Penis: Normal.       Testes: Normal.         Right: Mass, tenderness or swelling not present.         Left: Mass, tenderness or swelling not present.   Lymphadenopathy:      Head:      Right side of head: No submental, submandibular, tonsillar, preauricular, posterior auricular or occipital adenopathy.      Left side of head: No submental, submandibular, tonsillar, preauricular, posterior auricular or occipital adenopathy.      Cervical: No cervical adenopathy.      Lower Body: No right inguinal adenopathy. No left inguinal adenopathy.   Skin:     General: Skin is warm and dry.      Coloration: Skin is not pale.   Neurological:      Mental Status: He is alert and oriented to person, place, and time.      Cranial Nerves: No cranial nerve deficit.      Sensory: No sensory deficit.      Motor: No tremor, abnormal muscle tone or seizure activity.      Coordination: Coordination normal.      Gait: Gait normal.      Deep Tendon Reflexes: Reflexes are normal and symmetric.           Assessment & Plan      Medications        Problem List         LOS  CDL.  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  Remote history of traumatic brain injury with seizure disorder, no seizures since 2013, has been stable on Keppra, has been cleared to drive by neurology.  TAMMY.  Good control on cpap, no fatigue, no impact on driving.  Has letter / ckearance from neurology.          Diagnoses and all orders for this visit:    1. 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 Overweight (BMI 25.0-29.9)      BMI is >= 25 and <30. (Overweight) The following options were  offered after discussion;: exercise counseling/recommendations and nutrition counseling/recommendations          Expected course, medications, and adverse effects discussed.  Call or return if worsening or persistent symptoms.  I wore protective equipment throughout this patient encounter including a mask, gloves, and eye protection.  Hand hygiene was performed before donning protective equipment and after removal when leaving the room.  The complete contents of the assessment and plan and lab results as documented above have been reviewed and addressed by myself with the patient today as part of an ongoing evaluation / treatment plan.  If some of the documentation has been copied from a previous note and is unchanged it indicates that this problem / plan has been assessed today but is stable from a previous visit and no changes have been recommended.

## 2025-01-22 DIAGNOSIS — E78.2 MIXED HYPERLIPIDEMIA: ICD-10-CM

## 2025-01-22 RX ORDER — ROSUVASTATIN CALCIUM 10 MG/1
10 TABLET, COATED ORAL DAILY
Qty: 90 TABLET | Refills: 0 | Status: SHIPPED | OUTPATIENT
Start: 2025-01-22

## 2025-01-22 RX ORDER — ROSUVASTATIN CALCIUM 10 MG/1
10 TABLET, COATED ORAL DAILY
Qty: 90 TABLET | Refills: 0 | Status: SHIPPED | OUTPATIENT
Start: 2025-01-22 | End: 2025-01-22 | Stop reason: SDUPTHER

## 2025-01-22 NOTE — TELEPHONE ENCOUNTER
Caller: Mook Bose JEFFERY    Relationship: Self    Best call back number: [phone]    Requested Prescriptions:   Requested Prescriptions     Pending Prescriptions Disp Refills    rosuvastatin (CRESTOR) 10 MG tablet 90 tablet 0     Sig: Take 1 tablet by mouth Daily.        Pharmacy where request should be sent: [facility] [address] [place] [address] [id number] - [phone]  - [phone]     Last office visit with prescribing clinician: 7/25/2024   Last telemedicine visit with prescribing clinician: Visit date not found   Next office visit with prescribing clinician: 1/24/2025     Additional details provided by patient: TWO DAYS    Does the patient have less than a 3 day supply:  [x] Yes  [] No    Would you like a call back once the refill request has been completed: [x] Yes [] No    If the office needs to give you a call back, can they leave a voicemail: [x] Yes [] No    Janusz Wagner Rep   01/22/25 14:53 EST

## 2025-01-24 ENCOUNTER — OFFICE VISIT (OUTPATIENT)
Dept: CARDIOLOGY | Facility: CLINIC | Age: 65
End: 2025-01-24
Payer: MEDICARE

## 2025-01-24 VITALS
OXYGEN SATURATION: 99 % | HEIGHT: 70 IN | SYSTOLIC BLOOD PRESSURE: 151 MMHG | WEIGHT: 205 LBS | HEART RATE: 73 BPM | RESPIRATION RATE: 18 BRPM | DIASTOLIC BLOOD PRESSURE: 90 MMHG | BODY MASS INDEX: 29.35 KG/M2

## 2025-01-24 DIAGNOSIS — R94.31 ABNORMAL EKG: ICD-10-CM

## 2025-01-24 DIAGNOSIS — I25.10 CORONARY ARTERY CALCIFICATION: ICD-10-CM

## 2025-01-24 DIAGNOSIS — E78.2 MIXED HYPERLIPIDEMIA: Primary | ICD-10-CM

## 2025-01-24 DIAGNOSIS — R03.0 ELEVATED BLOOD PRESSURE READING WITHOUT DIAGNOSIS OF HYPERTENSION: ICD-10-CM

## 2025-01-24 PROCEDURE — 99214 OFFICE O/P EST MOD 30 MIN: CPT | Performed by: INTERNAL MEDICINE

## 2025-01-24 PROCEDURE — 1159F MED LIST DOCD IN RCRD: CPT | Performed by: INTERNAL MEDICINE

## 2025-01-24 PROCEDURE — 1160F RVW MEDS BY RX/DR IN RCRD: CPT | Performed by: INTERNAL MEDICINE

## 2025-01-24 PROCEDURE — 93000 ELECTROCARDIOGRAM COMPLETE: CPT | Performed by: INTERNAL MEDICINE

## 2025-01-24 NOTE — PROGRESS NOTES
Date of Office Visit: 2025  Encounter Provider: Dr. James Paul  Place of Service: [facility]  Patient Name: Mook Carr  :1960  Jason Covington MD    Chief Complaint   Patient presents with   • Hyperlipidemia   • Follow-up     History of Present Illness    I am pleased to see Mr. carr in my office today as a follow-up.    As you know, patient is 64-year-old white gentleman whose past medical history is significant for hyperlipidemia, traumatic brain injury, who was referred to me for abnormal CT calcium scoring test and shortness of breath.    In 2024, patient underwent calcium scoring CT scan and was noted to have high scores of 819.1.  Patient had 75 units in LAD, and 744.2 units and RCA.  Left main and LCx was free of calcification.    In 2024, patient underwent stress test which was negative for ischemia or myocardial infarction.    Patient came today for follow-up.  He is feeling better.  Patient denies any chest pain.  Shortness of breath is baseline.  No orthopnea PND no syncope or presyncope.  No leg edema noted.    Patient underwent stress test and it is unremarkable.  At this stage, I would recommend observation and risk factor modification.  His blood pressure is slightly high but all blood pressure readings previously has been stable.  I advised him to monitor the blood pressure at home.  I will see the patient as needed.    EKG showed normal sinus rhythm.  LVH noted.  Inferior Q waves present..      Past Medical History:   Diagnosis Date   • Hyperlipidemia    • Seizures          Past Surgical History:   Procedure Laterality Date   • CRANIOTOMY  2009   • VASECTOMY             Current Outpatient Medications:   •  aspirin 81 MG tablet, Take 1 tablet by mouth Daily., Disp: , Rfl:   •  donepezil (ARICEPT) 10 MG tablet, Take 1 tablet by mouth Every Night., Disp: , Rfl:   •  levETIRAcetam (KEPPRA) 750 MG tablet, Take 1 tablet by mouth 2  (Two) Times a Day., Disp: 180 tablet, Rfl: 3  •  melatonin 5 MG tablet tablet, Take  by mouth., Disp: , Rfl:   •  Omega-3 Fatty Acids (fish oil) 1000 MG capsule capsule, Take 1 capsule by mouth Daily With Breakfast., Disp: , Rfl:   •  PARoxetine (Paxil) 10 MG tablet, Take 1 tablet by mouth Every Morning., Disp: 90 tablet, Rfl: 1  •  rosuvastatin (CRESTOR) 10 MG tablet, Take 1 tablet by mouth Daily., Disp: 90 tablet, Rfl: 0  •  vitamin C (ASCORBIC ACID) 250 MG tablet, Take 1 tablet by mouth Daily., Disp: , Rfl:   •  vitamin E 400 UNIT capsule, Take 1 capsule by mouth Daily., Disp: , Rfl:       Social History     Socioeconomic History   • Marital status:    Tobacco Use   • Smoking status: Former     Current packs/day: 0.00     Average packs/day: 1 pack/day for 26.0 years (26.0 ttl pk-yrs)     Types: Cigars, Cigarettes     Start date: 1974     Quit date:      Years since quittin.0     Passive exposure: Past   • Smokeless tobacco: Former     Types: Chew   Vaping Use   • Vaping status: Never Used   Substance and Sexual Activity   • Alcohol use: No   • Drug use: No   • Sexual activity: Defer         Review of Systems   Constitutional: Negative for chills and fever.   HENT:  Negative for ear discharge and nosebleeds.    Eyes:  Negative for discharge and redness.   Cardiovascular:  Negative for chest pain, orthopnea, palpitations, paroxysmal nocturnal dyspnea and syncope.   Respiratory:  Negative for cough, shortness of breath and wheezing.    Endocrine: Negative for heat intolerance.   Skin:  Negative for rash.   Musculoskeletal:  Negative for arthritis and myalgias.   Gastrointestinal:  Negative for abdominal pain, melena, nausea and vomiting.   Genitourinary:  Negative for dysuria and hematuria.   Neurological:  Negative for dizziness, light-headedness, numbness and tremors.   Psychiatric/Behavioral:  Negative for depression. The patient is not nervous/anxious.        Procedures      ECG 12  "Lead    Date/Time: 1/24/2025 1:01 PM  Performed by: James Paul MD    Authorized by: James Paul MD  Comparison: compared with previous ECG   Similar to previous ECG  Rhythm: sinus rhythm  Q waves: II, III and aVF    Other findings: non-specific ST-T wave changes, T wave abnormality and left ventricular hypertrophy    Clinical impression: abnormal EKG      ECG 12 Lead    (Results Pending)           Objective:    /90 (BP Location: Right arm, Patient Position: Sitting, Cuff Size: Large Adult)   Pulse 73   Resp 18   Ht 179 cm (70.47\")   Wt 93 kg (205 lb)   SpO2 99%   BMI 29.02 kg/m²         Constitutional:       Appearance: Well-developed.   Eyes:      General: No scleral icterus.        Right eye: No discharge.   HENT:      Head: Normocephalic and atraumatic.   Neck:      Thyroid: No thyromegaly.      Lymphadenopathy: No cervical adenopathy.   Pulmonary:      Effort: Pulmonary effort is normal. No respiratory distress.      Breath sounds: Normal breath sounds. No wheezing. No rales.   Cardiovascular:      Normal rate. Regular rhythm.      No gallop.    Edema:     Peripheral edema absent.   Abdominal:      Tenderness: There is no abdominal tenderness.   Skin:     Findings: No erythema or rash.   Neurological:      Mental Status: Alert and oriented to person, place, and time.           Assessment:       Diagnosis Plan   1. Mixed hyperlipidemia  ECG 12 Lead      2. Coronary artery calcification        3. Elevated blood pressure reading without diagnosis of hypertension                 Plan:       MDM:    1.  Coronary calcification:    Patient underwent stress test which is negative.  At this stage recommend observation and risk factor modification    2.  Abnormal EKG:    Patient had Q waves in inferior leads but stress test is negative recommend observation    3.  Elevated blood pressure without diagnosis of hypertension:    Patient is advised to monitor the blood pressure at home and bring the logbook to " the PCP.    4.  Mixed hyperlipidemia:    Patient is on Crestor repeat lipid panel testing.  LDL is 48

## 2025-02-04 ENCOUNTER — OFFICE VISIT (OUTPATIENT)
Dept: FAMILY MEDICINE CLINIC | Facility: CLINIC | Age: 65
End: 2025-02-04
Payer: MEDICARE

## 2025-02-04 VITALS
BODY MASS INDEX: 29.38 KG/M2 | RESPIRATION RATE: 14 BRPM | OXYGEN SATURATION: 99 % | SYSTOLIC BLOOD PRESSURE: 138 MMHG | HEART RATE: 61 BPM | TEMPERATURE: 96.8 F | WEIGHT: 205.2 LBS | DIASTOLIC BLOOD PRESSURE: 82 MMHG | HEIGHT: 70 IN

## 2025-02-04 DIAGNOSIS — Z23 ENCOUNTER FOR PREVNAR PNEUMOCOCCAL VACCINATION: ICD-10-CM

## 2025-02-04 DIAGNOSIS — R41.3 MEMORY LOSS: ICD-10-CM

## 2025-02-04 DIAGNOSIS — E04.1 THYROID NODULE: ICD-10-CM

## 2025-02-04 DIAGNOSIS — E78.2 MIXED HYPERLIPIDEMIA: ICD-10-CM

## 2025-02-04 DIAGNOSIS — E66.3 OVERWEIGHT (BMI 25.0-29.9): ICD-10-CM

## 2025-02-04 DIAGNOSIS — R61 DIAPHORESIS: ICD-10-CM

## 2025-02-04 DIAGNOSIS — G47.33 OBSTRUCTIVE SLEEP APNEA SYNDROME: Primary | ICD-10-CM

## 2025-02-04 NOTE — PROGRESS NOTES
Subjective   Mook Bose is a 64 y.o. male.   No chief complaint on file.    History of Present Illness    Sleep apnea    Thyroid nodule    Memory Loss  Symptoms are chronic.   Onset was 1 to 5 years.   Symptoms occur daily.   Symptoms have been unchanged since onset.   Pertinent negative symptoms include no chest pain and no fatigue.   Night Sweats  Symptoms are recurrent.   Onset was 1 to 6 months.   Symptoms occur intermittently.   Symptoms have been coming and going since onset.   Pertinent negative symptoms include no chest pain and no fatigue.        The following portions of the patient's history were reviewed and updated as appropriate: allergies, current medications, past family history, past medical history, past social history, past surgical history, and problem list.    Past Medical History:   Diagnosis Date    Hyperlipidemia     Seizures        Past Surgical History:   Procedure Laterality Date    CRANIOTOMY  2009    VASECTOMY          Family History   Problem Relation Age of Onset    Alzheimer's disease Mother 62    Cancer Father         Unknown    Alzheimer's disease Sister 70    Heart disease Brother         dx at 59 living at 75    No Known Problems Daughter     No Known Problems Son     Heart disease Maternal Grandfather         MI at 44,  at 78    Stroke Maternal Grandfather     Hypertension Brother     Cancer Brother         dx at 49, living at 66    Other Brother         HEP. C        Social History     Socioeconomic History    Marital status:    Tobacco Use    Smoking status: Former     Current packs/day: 0.00     Average packs/day: 1 pack/day for 26.0 years (26.0 ttl pk-yrs)     Types: Cigars, Cigarettes     Start date: 1974     Quit date:      Years since quittin.1     Passive exposure: Past    Smokeless tobacco: Former     Types: Chew   Vaping Use    Vaping status: Never Used   Substance and Sexual Activity    Alcohol use: No    Drug use: No     "Sexual activity: Defer       Outpatient Medications Prior to Visit   Medication Sig Dispense Refill    aspirin 81 MG tablet Take 1 tablet by mouth Daily.      melatonin 5 MG tablet tablet Take  by mouth.      Omega-3 Fatty Acids (fish oil) 1000 MG capsule capsule Take 1 capsule by mouth Daily With Breakfast.      vitamin C (ASCORBIC ACID) 250 MG tablet Take 1 tablet by mouth Daily.      vitamin E 400 UNIT capsule Take 1 capsule by mouth Daily.      donepezil (ARICEPT) 10 MG tablet Take 1 tablet by mouth Every Night.      levETIRAcetam (KEPPRA) 750 MG tablet Take 1 tablet by mouth 2 (Two) Times a Day. 180 tablet 3    PARoxetine (Paxil) 10 MG tablet Take 1 tablet by mouth Every Morning. 90 tablet 1    rosuvastatin (CRESTOR) 10 MG tablet Take 1 tablet by mouth Daily. 90 tablet 0     No facility-administered medications prior to visit.        Review of Systems   Constitutional:  Positive for night sweats. Negative for fatigue.   HENT:  Negative for hearing loss.    Respiratory:  Negative for shortness of breath.    Cardiovascular:  Negative for chest pain and palpitations.   Genitourinary:  Negative for frequency.        Nocturia   Musculoskeletal:  Negative for joint swelling.   Neurological:  Positive for memory problem.       Objective   Visit Vitals  /82 (BP Location: Left arm, Patient Position: Sitting, Cuff Size: Large Adult)   Pulse 61   Temp 96.8 °F (36 °C)   Resp 14   Ht 177.8 cm (70\")   Wt 93.1 kg (205 lb 3.2 oz)   SpO2 99%   BMI 29.44 kg/m²     Physical Exam  Vitals and nursing note reviewed.   Constitutional:       Appearance: He is well-developed.   HENT:      Head: Normocephalic.   Neck:      Thyroid: No thyromegaly.      Vascular: No carotid bruit.      Trachea: Trachea normal.   Cardiovascular:      Rate and Rhythm: Normal rate and regular rhythm.      Heart sounds: No murmur heard.     No friction rub. No gallop.   Pulmonary:      Effort: Pulmonary effort is normal. No respiratory distress.      " Breath sounds: Normal breath sounds. No wheezing.   Chest:      Chest wall: No tenderness.   Musculoskeletal:      Cervical back: Neck supple.   Skin:     General: Skin is dry.      Findings: No rash.      Nails: There is no clubbing.   Neurological:      Mental Status: He is alert and oriented to person, place, and time.   Psychiatric:         Behavior: Behavior is cooperative.         Assessment & Plan   Problem List Items Addressed This Visit          Medium    Hyperlipidemia    Overview     7-5-24- calcium score 819         Current Assessment & Plan      Will order labs today and patient will return for results and shared decision making.           Relevant Orders    Comprehensive Metabolic Panel (Completed)    Lipid Panel (Completed)    Thyroid nodule    Overview     US done 7-15-24, advised repeat in 1year (7-2025), 2(7-2026), 3(7-2027) and 5 years(7-2029).  Nodule 1: Deep right thyroid lobe, 1.7 x 1.4 x 1.1 cm, solid, isoechoic, wider than tall, ill-defined margins, no echogenic foci (TR 3)   Nodule 2: Superficial right thyroid lobe, 1.3 x 1.1 x 0.9 cm, solid, hypoechoic, wider than tall, smooth margins, no echogenic foci (TR 4)   Nodule 3: Deep left thyroid lobe, 1.4 x 1.1 x 1.1 cm, solid, isoechoic, wider than tall, ill-defined margins, no echogenic foci (TR 3)     IMPRESSION:  Impression:     Thyroid nodules. Recommend follow-up ultrasound for 1.7 cm TR 3 right thyroid nodule and 1.3 cm TR 4 left thyroid nodule        TI-RADS: TR4 - Size between 1 cm and 1.5 cm. Recommend follow up thyroid ultrasound at years 1, 2, 3 and 5 of surveillance.      TI-RADS: TR3 - Size between 1.5 cm and 2.5 cm. Recommend follow up thyroid ultrasound at years 1, 3, and 5 years of  surveillance.     TI-RADS: TR3 - Size less than 1.5 cm. No follow up needed.         Current Assessment & Plan     Will repeat Ultrasound of the thyroid 7-2025            Unprioritized    Diaphoresis    Current Assessment & Plan     Worse at night  time when sleeping on his side,  Offered TB skin test-patient declines.  Will draw a CBC- Will order labs today and patient will return for results and shared decision making.           Relevant Orders    CBC & Differential (Completed)    Memory loss    Current Assessment & Plan     Improved- Patient scored 28 out 30 on memory test up from last year 25 out of 30.  Patient tolerated aricept well without side effects. I feel the benefits of the medication outweigh the risks.           Obstructive sleep apnea syndrome - Primary    Overview     Dr. Seiple         Current Assessment & Plan     Doing well; Patient is using a C-pap and is followed by Dr. Seipel.          RESOLVED: Overweight (BMI 25.0-29.9)    Current Assessment & Plan     Patient's (Body mass index is 29.44 kg/m².) indicates that they are overweight with health conditions that include hypertension . Weight is unchanged. BMI is above average; BMI management plan is completed. We discussed low calorie, low carb based diet program, portion control, and increasing exercise.           Other Visit Diagnoses       Encounter for Prevnar pneumococcal vaccination        Relevant Orders    Pneumococcal Conjugate Vaccine 20-Valent All (Completed)

## 2025-02-04 NOTE — ASSESSMENT & PLAN NOTE
Worse at night time when sleeping on his side,  Offered TB skin test-patient declines.  Will draw a CBC- Will order labs today and patient will return for results and shared decision making.

## 2025-02-04 NOTE — ASSESSMENT & PLAN NOTE
Improved- Patient scored 28 out 30 on memory test up from last year 25 out of 30.  Patient tolerated aricept well without side effects. I feel the benefits of the medication outweigh the risks.

## 2025-02-04 NOTE — ASSESSMENT & PLAN NOTE
Patient's (Body mass index is 29.44 kg/m².) indicates that they are overweight with health conditions that include hypertension . Weight is unchanged. BMI is above average; BMI management plan is completed. We discussed low calorie, low carb based diet program, portion control, and increasing exercise.

## 2025-02-05 LAB
ALBUMIN SERPL-MCNC: 4.4 G/DL (ref 3.9–4.9)
ALP SERPL-CCNC: 93 IU/L (ref 44–121)
ALT SERPL-CCNC: 13 IU/L (ref 0–44)
AST SERPL-CCNC: 15 IU/L (ref 0–40)
BASOPHILS # BLD AUTO: 0 X10E3/UL (ref 0–0.2)
BASOPHILS NFR BLD AUTO: 1 %
BILIRUB SERPL-MCNC: 0.5 MG/DL (ref 0–1.2)
BUN SERPL-MCNC: 14 MG/DL (ref 8–27)
BUN/CREAT SERPL: 16 (ref 10–24)
CALCIUM SERPL-MCNC: 9.6 MG/DL (ref 8.6–10.2)
CHLORIDE SERPL-SCNC: 99 MMOL/L (ref 96–106)
CHOLEST SERPL-MCNC: 115 MG/DL (ref 100–199)
CO2 SERPL-SCNC: 26 MMOL/L (ref 20–29)
CREAT SERPL-MCNC: 0.89 MG/DL (ref 0.76–1.27)
EGFRCR SERPLBLD CKD-EPI 2021: 95 ML/MIN/1.73
EOSINOPHIL # BLD AUTO: 0.1 X10E3/UL (ref 0–0.4)
EOSINOPHIL NFR BLD AUTO: 1 %
ERYTHROCYTE [DISTWIDTH] IN BLOOD BY AUTOMATED COUNT: 12.7 % (ref 11.6–15.4)
GLOBULIN SER CALC-MCNC: 2.8 G/DL (ref 1.5–4.5)
GLUCOSE SERPL-MCNC: 83 MG/DL (ref 70–99)
HCT VFR BLD AUTO: 42 % (ref 37.5–51)
HDLC SERPL-MCNC: 35 MG/DL
HGB BLD-MCNC: 14.1 G/DL (ref 13–17.7)
IMM GRANULOCYTES # BLD AUTO: 0 X10E3/UL (ref 0–0.1)
IMM GRANULOCYTES NFR BLD AUTO: 0 %
LDLC SERPL CALC-MCNC: 63 MG/DL (ref 0–99)
LYMPHOCYTES # BLD AUTO: 1.8 X10E3/UL (ref 0.7–3.1)
LYMPHOCYTES NFR BLD AUTO: 21 %
MCH RBC QN AUTO: 29.5 PG (ref 26.6–33)
MCHC RBC AUTO-ENTMCNC: 33.6 G/DL (ref 31.5–35.7)
MCV RBC AUTO: 88 FL (ref 79–97)
MONOCYTES # BLD AUTO: 0.6 X10E3/UL (ref 0.1–0.9)
MONOCYTES NFR BLD AUTO: 7 %
NEUTROPHILS # BLD AUTO: 6 X10E3/UL (ref 1.4–7)
NEUTROPHILS NFR BLD AUTO: 70 %
PLATELET # BLD AUTO: 393 X10E3/UL (ref 150–450)
POTASSIUM SERPL-SCNC: 4.4 MMOL/L (ref 3.5–5.2)
PROT SERPL-MCNC: 7.2 G/DL (ref 6–8.5)
RBC # BLD AUTO: 4.78 X10E6/UL (ref 4.14–5.8)
SODIUM SERPL-SCNC: 138 MMOL/L (ref 134–144)
TRIGL SERPL-MCNC: 84 MG/DL (ref 0–149)
VLDLC SERPL CALC-MCNC: 17 MG/DL (ref 5–40)
WBC # BLD AUTO: 8.5 X10E3/UL (ref 3.4–10.8)

## 2025-02-05 NOTE — ASSESSMENT & PLAN NOTE
Encouraged to do self-breast exam, self-testicle exams, and self derm exams. Congratulated on using seat belts.  Encouraged to do annual physical exams.  Immunization status reviewed.  Patient advised if he wants to bring someone with him to visits he can, but  he does not have to.

## 2025-02-05 NOTE — ASSESSMENT & PLAN NOTE
Lipid and CMP done 2-4-2025, read by me, reviewed with pt.  Trig. 84 up from 76, Tot. Chol. 115 up from 98, HDL 35 up from 34, LDL 63 up from 48  Improved and close to goal.   Encouraged to watch fatty intake, exercise more, and lose weight.   Compliant with medication .  Patient tolerated Crestor and Fish oil  well without side effects. I feel the benefits of the medication outweigh the risks.   Is not getting adequate diet and exercise  Goals developed at last visit were not met close to goal  Follow up in  6 months  Care management needs are self-addressed. Self-management abilities addressed and patient is capable of managing his own disease.

## 2025-02-06 ENCOUNTER — OFFICE VISIT (OUTPATIENT)
Dept: FAMILY MEDICINE CLINIC | Facility: CLINIC | Age: 65
End: 2025-02-06
Payer: MEDICARE

## 2025-02-06 VITALS
HEIGHT: 71 IN | SYSTOLIC BLOOD PRESSURE: 110 MMHG | RESPIRATION RATE: 18 BRPM | WEIGHT: 207 LBS | BODY MASS INDEX: 28.98 KG/M2 | OXYGEN SATURATION: 98 % | DIASTOLIC BLOOD PRESSURE: 78 MMHG | HEART RATE: 76 BPM | TEMPERATURE: 97.1 F

## 2025-02-06 DIAGNOSIS — J34.2 DEVIATED SEPTUM: ICD-10-CM

## 2025-02-06 DIAGNOSIS — F32.A DEPRESSION, UNSPECIFIED DEPRESSION TYPE: ICD-10-CM

## 2025-02-06 DIAGNOSIS — E78.2 MIXED HYPERLIPIDEMIA: ICD-10-CM

## 2025-02-06 DIAGNOSIS — Z00.01 ENCOUNTER FOR GENERAL ADULT MEDICAL EXAMINATION WITH ABNORMAL FINDINGS: ICD-10-CM

## 2025-02-06 DIAGNOSIS — R09.1 PLEURISY: ICD-10-CM

## 2025-02-06 DIAGNOSIS — R10.12 LEFT UPPER QUADRANT PAIN: ICD-10-CM

## 2025-02-06 DIAGNOSIS — I51.7 LVH (LEFT VENTRICULAR HYPERTROPHY): ICD-10-CM

## 2025-02-06 DIAGNOSIS — R61 DIAPHORESIS: ICD-10-CM

## 2025-02-06 DIAGNOSIS — M54.10 BACK PAIN WITH RADICULOPATHY: ICD-10-CM

## 2025-02-06 DIAGNOSIS — G47.33 OBSTRUCTIVE SLEEP APNEA SYNDROME: ICD-10-CM

## 2025-02-06 DIAGNOSIS — Z71.85 IMMUNIZATION COUNSELING: ICD-10-CM

## 2025-02-06 DIAGNOSIS — R93.1 HIGH CORONARY ARTERY CALCIUM SCORE: ICD-10-CM

## 2025-02-06 DIAGNOSIS — H90.2 CONDUCTIVE HEARING LOSS, UNSPECIFIED LATERALITY: ICD-10-CM

## 2025-02-06 DIAGNOSIS — R35.1 NOCTURIA: ICD-10-CM

## 2025-02-06 DIAGNOSIS — E04.1 THYROID NODULE: ICD-10-CM

## 2025-02-06 DIAGNOSIS — G30.0 MILD EARLY ONSET ALZHEIMER'S DEMENTIA WITH ANXIETY: ICD-10-CM

## 2025-02-06 DIAGNOSIS — E66.3 OVERWEIGHT (BMI 25.0-29.9): ICD-10-CM

## 2025-02-06 DIAGNOSIS — Z00.00 WELCOME TO MEDICARE PREVENTIVE VISIT: Primary | ICD-10-CM

## 2025-02-06 DIAGNOSIS — Z82.49 FAMILY HISTORY OF CORONARY ARTERY DISEASE: ICD-10-CM

## 2025-02-06 DIAGNOSIS — F32.2 CURRENT SEVERE EPISODE OF MAJOR DEPRESSIVE DISORDER WITHOUT PSYCHOTIC FEATURES, UNSPECIFIED WHETHER RECURRENT: ICD-10-CM

## 2025-02-06 DIAGNOSIS — Z71.89 ADVANCE CARE PLANNING: ICD-10-CM

## 2025-02-06 DIAGNOSIS — Z87.891 HISTORY OF TOBACCO USE: ICD-10-CM

## 2025-02-06 DIAGNOSIS — R41.3 MEMORY LOSS: ICD-10-CM

## 2025-02-06 DIAGNOSIS — S06.5XAA SUBDURAL HEMATOMA: ICD-10-CM

## 2025-02-06 DIAGNOSIS — K63.5 HYPERPLASTIC COLONIC POLYP, UNSPECIFIED PART OF COLON: ICD-10-CM

## 2025-02-06 DIAGNOSIS — G56.01 CARPAL TUNNEL SYNDROME OF RIGHT WRIST: ICD-10-CM

## 2025-02-06 DIAGNOSIS — M50.90 CERVICAL DISC DISEASE: ICD-10-CM

## 2025-02-06 DIAGNOSIS — R56.9 SEIZURES: ICD-10-CM

## 2025-02-06 DIAGNOSIS — D12.6 ADENOMATOUS POLYP OF COLON, UNSPECIFIED PART OF COLON: ICD-10-CM

## 2025-02-06 DIAGNOSIS — S12.591A OTHER CLOSED NONDISPLACED FRACTURE OF SIXTH CERVICAL VERTEBRA, INITIAL ENCOUNTER: ICD-10-CM

## 2025-02-06 DIAGNOSIS — M67.40 GANGLION CYST: ICD-10-CM

## 2025-02-06 DIAGNOSIS — Z83.3 FAMILY HISTORY OF DIABETES MELLITUS: ICD-10-CM

## 2025-02-06 DIAGNOSIS — F02.A4 MILD EARLY ONSET ALZHEIMER'S DEMENTIA WITH ANXIETY: ICD-10-CM

## 2025-02-06 PROBLEM — S12.501A CLOSED NONDISPLACED FRACTURE OF SIXTH CERVICAL VERTEBRA: Status: RESOLVED | Noted: 2019-09-12 | Resolved: 2025-02-06

## 2025-02-06 PROCEDURE — 1160F RVW MEDS BY RX/DR IN RCRD: CPT | Performed by: FAMILY MEDICINE

## 2025-02-06 PROCEDURE — G0402 INITIAL PREVENTIVE EXAM: HCPCS | Performed by: FAMILY MEDICINE

## 2025-02-06 PROCEDURE — 1126F AMNT PAIN NOTED NONE PRSNT: CPT | Performed by: FAMILY MEDICINE

## 2025-02-06 PROCEDURE — G2211 COMPLEX E/M VISIT ADD ON: HCPCS | Performed by: FAMILY MEDICINE

## 2025-02-06 PROCEDURE — 99214 OFFICE O/P EST MOD 30 MIN: CPT | Performed by: FAMILY MEDICINE

## 2025-02-06 PROCEDURE — 93224 XTRNL ECG REC UP TO 48 HRS: CPT | Performed by: FAMILY MEDICINE

## 2025-02-06 PROCEDURE — 1159F MED LIST DOCD IN RCRD: CPT | Performed by: FAMILY MEDICINE

## 2025-02-06 RX ORDER — LEVETIRACETAM 750 MG/1
750 TABLET ORAL 2 TIMES DAILY
Start: 2025-02-06

## 2025-02-06 RX ORDER — ROSUVASTATIN CALCIUM 10 MG/1
10 TABLET, COATED ORAL DAILY
Qty: 90 TABLET | Refills: 3 | Status: SHIPPED | OUTPATIENT
Start: 2025-02-06

## 2025-02-06 RX ORDER — PAROXETINE 10 MG/1
10 TABLET, FILM COATED ORAL EVERY MORNING
Qty: 90 TABLET | Refills: 3 | Status: SHIPPED | OUTPATIENT
Start: 2025-02-06

## 2025-02-06 RX ORDER — DONEPEZIL HYDROCHLORIDE 10 MG/1
10 TABLET, FILM COATED ORAL NIGHTLY
Qty: 90 TABLET | Refills: 3 | Status: SHIPPED | OUTPATIENT
Start: 2025-02-06

## 2025-02-06 NOTE — ASSESSMENT & PLAN NOTE
Advised to lower risk factors.  Continue ASA.  Patient tolerated ASA well without side effects. I feel the benefits of the medication outweigh the risks.

## 2025-02-06 NOTE — ASSESSMENT & PLAN NOTE
Questionable diagnosis, discuss with Dr. Seiple.  Patient was able to quickly current my math problem

## 2025-02-06 NOTE — ASSESSMENT & PLAN NOTE
Doing well.  Patient tolerated Paxil well without side effects. I feel the benefits of the medication outweigh the risks. Discussed with patient stopping Paxil, patient wants to think about it.

## 2025-02-06 NOTE — ASSESSMENT & PLAN NOTE
Advised to lower risk factors.  Patient tolerated ASA well without side effects. I feel the benefits of the medication outweigh the risks.

## 2025-02-06 NOTE — PROGRESS NOTES
Subjective   Mook Bose is a 64 y.o. male here for his annual physical with me. Mook is here for coordination of medical care, to discuss health maintenance, disease prevention as well as to followup on medical problems. Patient has been followed by me since 1988. Patient's last CPE was 3-13-24. Activity level is minimal. Exercises 0 per week. Appetite is good. Feels well with many complaints. Energy level is good. Sleeps fairly well. Patient's last colonoscopy was 7- with Dr. Danielson. He is advised to repeat in 3 years. Patient is doing routine self skin exam monthly. Patient is doing routine self-breast exams monthly. Patient is checking testicles monthly.    Lab Results   Component Value Date    PSA 2.1 03/06/2024        History of Present Illness  Welcome to medicare Visit.  Hyperlipidemia  This is a chronic problem. The current episode started more than 1 year ago. The problem is controlled. Factors aggravating his hyperlipidemia include fatty foods. Pertinent negatives include no chest pain, myalgias or shortness of breath. Current antihyperlipidemic treatment includes statins. The current treatment provides mild improvement of lipids.   Heart Problem  Today's concern : LVH.   Symptoms are new.   Onset was 1 to 4 weeks.   Symptoms occur constantly.   Symptoms have been unchanged since onset.   Pertinent negative symptoms include no abdominal pain, no chest pain, no chills, no congestion, no cough, no fatigue, no fever, no myalgias, no nausea, no neck pain, no rash, no sore throat, no dysuria, no vomiting and no weakness.   Depression  Presents for follow-up visit. Patient is not experiencing: decreased concentration, depressed mood, nervousness/anxiety, palpitations, shortness of breath, weight gain, weight loss, chest pain, dizziness and nausea.Symptoms occur rarely.  The severity of symptoms is mild.  The quality of sleep is fair. His past medical history is significant for depression.  Compliance with medications is %.   Seizures   This is a chronic problem. The current episode started more than 1 week ago. The problem has not changed since onset.Pertinent negatives include no speech difficulty, no visual disturbance, no sore throat, no chest pain, no cough, no nausea, no vomiting and no diarrhea. The episode was Witnessed.        The following portions of the patient's history were reviewed and updated as appropriate: allergies, current medications, past family history, past medical history, past social history, past surgical history, and problem list.    Past Medical History:   Diagnosis Date    Hyperlipidemia     Seizures        Family History   Problem Relation Age of Onset    Alzheimer's disease Mother 62    Cancer Father         Unknown    Alzheimer's disease Sister 70    Heart disease Brother         dx at 59 living at 75    No Known Problems Daughter     No Known Problems Son     Heart disease Maternal Grandfather         MI at 44,  at 78    Stroke Maternal Grandfather     Hypertension Brother     Cancer Brother         dx at 49, living at 66    Other Brother         HEP. C       Social History     Socioeconomic History    Marital status:    Tobacco Use    Smoking status: Former     Current packs/day: 0.00     Average packs/day: 1 pack/day for 26.0 years (26.0 ttl pk-yrs)     Types: Cigars, Cigarettes     Start date: 1974     Quit date:      Years since quittin.1     Passive exposure: Past    Smokeless tobacco: Former     Types: Chew   Vaping Use    Vaping status: Never Used   Substance and Sexual Activity    Alcohol use: No    Drug use: No    Sexual activity: Defer       Social History     Social History Narrative     1 x 1981,  after 37 years.  2 children together.  Lives with  since , just the 2 of them at home.  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  Caffeine 3 cups  coffee daily, 2 tea rare soda daily.  Exercise none other than at work.  Hobbies Flea markets and shopping.  Very seldom wears seatbelt.        Past Surgical History:   Procedure Laterality Date    CRANIOTOMY  04/08/2009    VASECTOMY  1992       Current Outpatient Medications on File Prior to Visit   Medication Sig Dispense Refill    aspirin 81 MG tablet Take 1 tablet by mouth Daily.      melatonin 5 MG tablet tablet Take  by mouth.      Omega-3 Fatty Acids (fish oil) 1000 MG capsule capsule Take 1 capsule by mouth Daily With Breakfast.      vitamin C (ASCORBIC ACID) 250 MG tablet Take 1 tablet by mouth Daily.      vitamin E 400 UNIT capsule Take 1 capsule by mouth Daily.       No current facility-administered medications on file prior to visit.       Allergies   Allergen Reactions    Penicillins Hives       Review of Systems   Constitutional:  Negative for appetite change, chills, fatigue, fever, unexpected weight gain and unexpected weight loss.   HENT:  Negative for congestion, dental problem, ear discharge, ear pain, hearing loss, nosebleeds, postnasal drip, rhinorrhea, sinus pressure, sneezing, sore throat, tinnitus and voice change.         Last dental exam approx 2017-advised to follow, pt. Declines.   Eyes:  Negative for blurred vision, double vision, pain, redness and visual disturbance.        Last vision exam 7-2020 with Dr. Barrera-advised to follow, pt stated he will make appt.   Respiratory:  Negative for cough, shortness of breath, wheezing and stridor.    Cardiovascular:  Negative for chest pain, palpitations and leg swelling.   Gastrointestinal:  Negative for abdominal pain, anal bleeding, blood in stool, constipation, diarrhea, nausea, rectal pain, vomiting, GERD and indigestion.        7 BM weekly   Endocrine: Negative for cold intolerance, heat intolerance, polydipsia, polyphagia and polyuria.        Sex Drive  He is a 5   She is a ?   Genitourinary:  Negative for difficulty urinating, dysuria,  "frequency, hematuria and urgency.   Musculoskeletal:  Negative for back pain, joint swelling, myalgias, neck pain and neck stiffness.   Skin:  Negative for color change, dry skin and rash.   Neurological:  Positive for seizures. Negative for dizziness, syncope, speech difficulty, weakness, light-headedness, headache and memory problem.   Hematological:  Does not bruise/bleed easily.   Psychiatric/Behavioral:  Negative for decreased concentration, sleep disturbance, depressed mood and stress. The patient is not nervous/anxious.        Objective   Visit Vitals  /78 (BP Location: Left arm, Patient Position: Sitting, Cuff Size: Adult)   Pulse 76   Temp 97.1 °F (36.2 °C) (Temporal)   Resp 18   Ht 179.1 cm (70.5\")   Wt 93.9 kg (207 lb)   SpO2 98%   BMI 29.28 kg/m²     Physical Exam  Constitutional:       General: He is not in acute distress.     Appearance: He is well-developed. He is not diaphoretic.   HENT:      Head: Normocephalic.      Right Ear: Hearing, tympanic membrane, ear canal and external ear normal. Decreased hearing (hearing aids) noted.      Left Ear: Hearing, tympanic membrane, ear canal and external ear normal. Decreased hearing (hearing aids) noted.      Nose: Nose normal.      Mouth/Throat:      Lips: Pink.      Mouth: Mucous membranes are moist.      Pharynx: Oropharynx is clear. No oropharyngeal exudate.   Eyes:      General: Lids are normal. No scleral icterus.        Right eye: No discharge.         Left eye: No discharge.      Extraocular Movements: Extraocular movements intact.      Conjunctiva/sclera: Conjunctivae normal.      Pupils: Pupils are equal, round, and reactive to light.      Comments: Pupils are small and fundi difficult to visualize   Neck:      Trachea: Trachea normal.   Cardiovascular:      Rate and Rhythm: Normal rate and regular rhythm.      Pulses:           Dorsalis pedis pulses are 1+ on the right side and 1+ on the left side.        Posterior tibial pulses are 1+ on the " right side and 1+ on the left side.      Heart sounds: Normal heart sounds. No murmur heard.  Pulmonary:      Effort: Pulmonary effort is normal.      Breath sounds: Normal breath sounds. No wheezing.   Chest:      Chest wall: No tenderness.   Breasts:     Right: Normal. No swelling, bleeding, inverted nipple, mass, nipple discharge, skin change or tenderness.      Left: Normal. No swelling, bleeding, inverted nipple, mass, nipple discharge, skin change or tenderness.   Abdominal:      General: Bowel sounds are normal. There is no distension.      Palpations: Abdomen is soft. There is no mass.      Tenderness: There is no abdominal tenderness.      Hernia: No hernia is present.   Genitourinary:     Penis: Normal and circumcised. No tenderness.       Testes: Normal.      Prostate: Normal.      Rectum: Normal.   Musculoskeletal:         General: No tenderness or deformity. Normal range of motion.      Cervical back: Full passive range of motion without pain, normal range of motion and neck supple.   Lymphadenopathy:      Cervical: No cervical adenopathy.   Skin:     General: Skin is warm and dry.      Findings: No erythema or rash.      Comments: Hemangioma of the mid nose.  Varicosities of the nose.  Skin tags bilateral axillae.  Nevus's scattered over the body   Neurological:      General: No focal deficit present.      Mental Status: He is alert and oriented to person, place, and time.      Cranial Nerves: Cranial nerves 2-12 are intact. No cranial nerve deficit.      Sensory: Sensation is intact. No sensory deficit.      Motor: Motor function is intact. No abnormal muscle tone.      Coordination: Coordination is intact. Coordination normal.      Gait: Gait is intact.      Deep Tendon Reflexes: Reflexes normal.   Psychiatric:         Behavior: Behavior normal.         Thought Content: Thought content normal.         Judgment: Judgment normal.         Assessment & Plan   Problem List Items Addressed This Visit           High    Current severe episode of major depressive disorder without psychotic features    Current Assessment & Plan     Doing well.  Patient tolerated Paxil well without side effects. I feel the benefits of the medication outweigh the risks. Discussed with patient stopping Paxil, patient wants to think about it.           Relevant Medications    donepezil (ARICEPT) 10 MG tablet    PARoxetine (Paxil) 10 MG tablet    Mild early onset Alzheimer's dementia    Current Assessment & Plan     Questionable diagnosis.  Advised to discuss with Dr. Seiple staying on Aricept.  Patient was able to quickly correct a math mistake I made.  Patient tolerated Aricept well without side effects. I feel the benefits of the medication outweigh the risks.          Relevant Medications    donepezil (ARICEPT) 10 MG tablet    PARoxetine (Paxil) 10 MG tablet    Seizures    Overview     Last seizure was 10-1-2023  Followed with Dr. Seiple  EEG done 10-19-23,         Current Assessment & Plan     Doing well.  Patient on Keppra prescribed by Dr. Seiple         Relevant Medications    levETIRAcetam (KEPPRA) 750 MG tablet    Subdural hematoma    Overview     Probably contributes to memory loss         Current Assessment & Plan     Doing well            Medium    Adenomatous colon polyp    Overview     Colonoscopy done 7-2023 with Dr. Danielson         Current Assessment & Plan     Advised repeat 7-2026         Hyperlipidemia    Overview     7-5-24- calcium score 819         Current Assessment & Plan     Lipid and CMP done 2-4-2025, read by me, reviewed with pt.  Trig. 84 up from 76, Tot. Chol. 115 up from 98, HDL 35 up from 34, LDL 63 up from 48  Improved and close to goal.   Encouraged to watch fatty intake, exercise more, and lose weight.   Compliant with medication .  Patient tolerated Crestor and Fish oil  well without side effects. I feel the benefits of the medication outweigh the risks.   Is not getting adequate diet and exercise  Goals  developed at last visit were not met close to goal  Follow up in  6 months  Care management needs are self-addressed. Self-management abilities addressed and patient is capable of managing his own disease.           Relevant Medications    rosuvastatin (CRESTOR) 10 MG tablet    Other Relevant Orders    Lipid Panel With / Chol / HDL Ratio    Comprehensive Metabolic Panel    Thyroid nodule    Overview     US done 7-15-24, advised repeat in 1year (7-2025), 2(7-2026), 3(7-2027) and 5 years(7-2029).  Nodule 1: Deep right thyroid lobe, 1.7 x 1.4 x 1.1 cm, solid, isoechoic, wider than tall, ill-defined margins, no echogenic foci (TR 3)   Nodule 2: Superficial right thyroid lobe, 1.3 x 1.1 x 0.9 cm, solid, hypoechoic, wider than tall, smooth margins, no echogenic foci (TR 4)   Nodule 3: Deep left thyroid lobe, 1.4 x 1.1 x 1.1 cm, solid, isoechoic, wider than tall, ill-defined margins, no echogenic foci (TR 3)     IMPRESSION:  Impression:     Thyroid nodules. Recommend follow-up ultrasound for 1.7 cm TR 3 right thyroid nodule and 1.3 cm TR 4 left thyroid nodule        TI-RADS: TR4 - Size between 1 cm and 1.5 cm. Recommend follow up thyroid ultrasound at years 1, 2, 3 and 5 of surveillance.      TI-RADS: TR3 - Size between 1.5 cm and 2.5 cm. Recommend follow up thyroid ultrasound at years 1, 3, and 5 years of  surveillance.     TI-RADS: TR3 - Size less than 1.5 cm. No follow up needed.         Current Assessment & Plan     Will repeat U/S  next year            Low    RESOLVED: Closed nondisplaced fracture of sixth cervical vertebra    Current Assessment & Plan     Duplicate thus delete            Unprioritized    Advance care planning    Current Assessment & Plan     Completed 2-6-25 and scanned to chart         Back pain with radiculopathy    Current Assessment & Plan     Intermittent  and mild with hard labor         Carpal tunnel syndrome of right wrist    Current Assessment & Plan     Intermittent and mild             Cervical disc disease    Overview     Hx cervical fracture         Current Assessment & Plan     Doing well.         Deviated septum    Current Assessment & Plan     Stable         Diaphoresis    Current Assessment & Plan     Pt. Declined T-B testing.  CBC done 12-, read by me, reviewed with pt.CBC was normal         Encounter for general adult medical examination with abnormal findings    Current Assessment & Plan     Encouraged to do self-breast exam, self-testicle exams, and self derm exams. Congratulated on using seat belts.  Encouraged to do annual physical exams.  Immunization status reviewed.  Patient advised if he wants to bring someone with him to visits he can, but  he does not have to.           Family history of coronary artery disease    Current Assessment & Plan     Advised to lower risk factors.  Patient tolerated ASA well without side effects. I feel the benefits of the medication outweigh the risks.          Family history of diabetes mellitus    Current Assessment & Plan     Advised to lower risk factors and have yrly glucose tested         RESOLVED: Ganglion cyst    Current Assessment & Plan     Resolved thus delete         Hearing loss    Current Assessment & Plan     Stable.  Advised to wear hearing protection and avoid noise exposure         High coronary artery calcium score    Overview     Stress test Myocardial Perfusion done 8-29-24          Current Assessment & Plan     Advised to lower risk factors.  Continue ASA.  Patient tolerated ASA well without side effects. I feel the benefits of the medication outweigh the risks.          History of tobacco use    Overview     Quit 1999  PPY HX 26         Current Assessment & Plan     Congratulated on continued cessation         RESOLVED: Hyperplastic colon polyp    Overview     Colonoscopy done 7-2023 with Dr. Danielson, advised repeat in 3 years in 7-2026         Current Assessment & Plan     Resolved thus delete         Immunization  counseling    Overview     UTD as of 2-6-25  COVID 11-7-21, 3-25-21 and 2-25-21  Flu 12-4-24  Pneu 20 done 2-4-25  T-Dap 3-13-19  Shingrix 2-23-22 and 3-28-19         Current Assessment & Plan     UTD as of 2-6-25         RESOLVED: Left upper quadrant pain    Current Assessment & Plan     Resolved thus delete         LVH (left ventricular hypertrophy)    Current Assessment & Plan     New dx.  EKG done 1-, EKG showed LVH, will repeat with next PE         Memory loss    Current Assessment & Plan     Questionable diagnosis, discuss with Dr. Seiple.  Patient was able to quickly current my math problem         Relevant Medications    donepezil (ARICEPT) 10 MG tablet    RESOLVED: Nocturia    Current Assessment & Plan     Resolved thus delete.  U/A done 1-8-2025, read by me, reviewed with pt.  U/A was normal         Obstructive sleep apnea syndrome    Overview     Dr. Seiple         Current Assessment & Plan     Doing well; Patient is using a C-pap and is followed by Dr. Seipel.          RESOLVED: Overweight (BMI 25.0-29.9)    Current Assessment & Plan     Resolved thus delete         RESOLVED: Pleurisy    Current Assessment & Plan     Resolved thus delete         Welcome to Medicare preventive visit - Primary    Current Assessment & Plan     Completed 2-6-2025          Other Visit Diagnoses       Depression, unspecified depression type        Relevant Medications    donepezil (ARICEPT) 10 MG tablet    PARoxetine (Paxil) 10 MG tablet                      Encouraged to check his skin, testicles and breasts monthly. Reviewed exercising regularly, eating a balanced diet, immunizations and if due, patient counselled to check with insurance company for coverage;, and regularly checking skin and breasts.The ABCs of the Annual Wellness Visit  Welcome to Medicare Visit    Subjective     Mook Bose is a 64 y.o. male who presents for a  Welcome to Medicare Visit.    The following portions of the patient's history were  reviewed and   updated as appropriate: allergies, current medications, past family history, past medical history, past social history, past surgical history, and problem list.     Compared to one year ago, the patient feels his physical   health is the same.    Compared to one year ago, the patient feels his mental   health is the same.    Recent Hospitalizations:  He was not admitted to the hospital during the last year.       Current Medical Providers:  Patient Care Team:  Jason Covington MD as PCP - General (Family Medicine)  Adolph Oneill Jr., MD as Consulting Physician (Neurology)  Eugenie Mcclure MA (Inactive) as Medical Assistant  Dr. Seipal-neurologist  DR. Paul-cardiologist  Outpatient Medications Prior to Visit   Medication Sig Dispense Refill    aspirin 81 MG tablet Take 1 tablet by mouth Daily.      melatonin 5 MG tablet tablet Take  by mouth.      Omega-3 Fatty Acids (fish oil) 1000 MG capsule capsule Take 1 capsule by mouth Daily With Breakfast.      vitamin C (ASCORBIC ACID) 250 MG tablet Take 1 tablet by mouth Daily.      vitamin E 400 UNIT capsule Take 1 capsule by mouth Daily.      donepezil (ARICEPT) 10 MG tablet Take 1 tablet by mouth Every Night.      levETIRAcetam (KEPPRA) 750 MG tablet Take 1 tablet by mouth 2 (Two) Times a Day. 180 tablet 3    PARoxetine (Paxil) 10 MG tablet Take 1 tablet by mouth Every Morning. 90 tablet 1    rosuvastatin (CRESTOR) 10 MG tablet Take 1 tablet by mouth Daily. 90 tablet 0     No facility-administered medications prior to visit.       No opioid medication identified on active medication list. I have reviewed chart for other potential  high risk medication/s and harmful drug interactions in the elderly.        Aspirin is on active medication list. Aspirin use is indicated based on review of current medical condition/s. Pros and cons of this therapy have been discussed today. Benefits of this medication outweigh potential harm.  Patient has been  encouraged to continue taking this medication.  .      Patient Active Problem List   Diagnosis    Seizures    Subdural hematoma    History of tobacco use    Hearing loss    Screening PSA (prostate specific antigen)    Hyperlipidemia    Encounter for general adult medical examination with abnormal findings    Deviated septum    Carpal tunnel syndrome of right wrist    Cervical disc disease    Family history of coronary artery disease    Family history of diabetes mellitus    Obstructive sleep apnea syndrome    Current severe episode of major depressive disorder without psychotic features    Adenomatous colon polyp    Mild early onset Alzheimer's dementia    Back pain with radiculopathy    High coronary artery calcium score    Memory loss    Thyroid nodule    Diaphoresis    LVH (left ventricular hypertrophy)    Immunization counseling    Welcome to Medicare preventive visit    Advance care planning       Above medical problems all reviewed and significant problems identified and reviewed in the E and M visit.     Past Medical History:   Diagnosis Date    Hyperlipidemia     Seizures       Past Surgical History:   Procedure Laterality Date    CRANIOTOMY  2009    VASECTOMY        Family History   Problem Relation Age of Onset    Alzheimer's disease Mother 62    Cancer Father         Unknown    Alzheimer's disease Sister 70    Heart disease Brother         dx at 59 living at 75    No Known Problems Daughter     No Known Problems Son     Heart disease Maternal Grandfather         MI at 44,  at 78    Stroke Maternal Grandfather     Hypertension Brother     Cancer Brother         dx at 49, living at 66    Other Brother         HEP. C      Social History     Socioeconomic History    Marital status:    Tobacco Use    Smoking status: Former     Current packs/day: 0.00     Average packs/day: 1 pack/day for 26.0 years (26.0 ttl pk-yrs)     Types: Cigars, Cigarettes     Start date: 1974     Quit date:  1999     Years since quittin.1     Passive exposure: Past    Smokeless tobacco: Former     Types: Chew   Vaping Use    Vaping status: Never Used   Substance and Sexual Activity    Alcohol use: No    Drug use: No    Sexual activity: Defer      Allergies   Allergen Reactions    Penicillins Hives        Advance Care Planning   Advance Care Planning  Discussion with Patientregarding advanced directives. POST form discussed at length and reviewed with patient. I spent over 16 minutes  with patient reviewing information and documenting  in the chart.  Patient states he does want CPR. Reviewed medical interventions with patient and the differences between each: Comfort, Limited and Full. Patient opted for Full. Discussed the use of antibiotics at the end of life. He chose to use antibiotics consistent with treatment goals. Discussed artificially administered nutrition, patient is aware that if he is alert and oriented they can change their mind at any time. However, they have elected to have no artificial nutrition. Patient has identified his daughter Ana Rubin and if she is not available his son Maximilian Bose  as his healthcare representative. Advised to discuss with healthcare representative if they can comply with wishes. Patient encouraged to have a meeting to discuss his decision regarding advanced care directives and goals of care with extended family and significant  friends  In regard to the POST form:The patient opted to complete POST while in the office and copy scanned into the chart. and advised to give to family members, place in an easily accessible place and take with them if going to the hospital or Emergency room.       Advance Directive is not on file.  ACP discussion was held with the patient during this visit. Patient does not have an advance directive, information provided.       Objective   Vitals:    25 1008   BP: 110/78   BP Location: Left arm   Patient Position: Sitting   Cuff  "Size: Adult   Pulse: 76   Resp: 18   Temp: 97.1 °F (36.2 °C)   TempSrc: Temporal   SpO2: 98%   Weight: 93.9 kg (207 lb)   Height: 179.1 cm (70.5\")   PainSc: 0-No pain     Estimated body mass index is 29.28 kg/m² as calculated from the following:    Height as of this encounter: 179.1 cm (70.5\").    Weight as of this encounter: 93.9 kg (207 lb).           Does the patient have evidence of cognitive impairment?   No    Lab Results   Component Value Date    CHLPL 115 2025    TRIG 84 2025    HDL 35 (L) 2025    LDL 63 2025    VLDL 17 2025            HEALTH RISK ASSESSMENT    Smoking Status:  Social History     Tobacco Use   Smoking Status Former    Current packs/day: 0.00    Average packs/day: 1 pack/day for 26.0 years (26.0 ttl pk-yrs)    Types: Cigars, Cigarettes    Start date: 1974    Quit date:     Years since quittin.1    Passive exposure: Past   Smokeless Tobacco Former    Types: Chew     Alcohol Consumption:  Social History     Substance and Sexual Activity   Alcohol Use No       Fall Risk Screen:    YUE Fall Risk Assessment was completed, and patient is at LOW risk for falls.Assessment completed on:2025    Depression Screen:       2025    10:21 AM   PHQ-2/PHQ-9 Depression Screening   Little interest or pleasure in doing things Not at all   Feeling down, depressed, or hopeless Not at all   How difficult have these problems made it for you to do your work, take care of things at home, or get along with other people? Not difficult at all   Treated for depression    Health Habits and Functional and Cognitive Screenin/6/2025    10:19 AM   Functional & Cognitive Status   Do you have difficulty preparing food and eating? No   Do you have difficulty bathing yourself, getting dressed or grooming yourself? No   Do you have difficulty using the toilet? No   Do you have difficulty moving around from place to place? No   Do you have trouble with steps or getting " out of a bed or a chair? No   Current Diet Well Balanced Diet   Dental Exam Not up to date   Eye Exam Not up to date   Exercise (times per week) 0 times per week   Current Exercises Include No Regular Exercise   Do you need help using the phone?  No   Are you deaf or do you have serious difficulty hearing?  No   Do you need help to go to places out of walking distance? No   Do you need help shopping? No   Do you need help preparing meals?  No   Do you need help with housework?  No   Do you need help with laundry? No   Do you need help taking your medications? No   Do you need help managing money? No   Do you ever drive or ride in a car without wearing a seat belt? Yes   Have you felt unusual stress, anger or loneliness in the last month? No   Who do you live with? Other   If you need help, do you have trouble finding someone available to you? No   Have you been bothered in the last four weeks by sexual problems? No   Do you have difficulty concentrating, remembering or making decisions? No   He is advised to use seat belts    Visual Acuity:    Vision Screening    Right eye Left eye Both eyes   Without correction 20/30 20/40 20/40   With correction          Age-appropriate Screening Schedule:  Refer to the list below for future screening recommendations based on patient's age, sex and/or medical conditions. Orders for these recommended tests are listed in the plan section. The patient has been provided with a written plan.      Immunizations:  Mook Bose is up to date on immunizations    Colorectal Screening  Mook Bose last colonoscopy was 7-19-23 with Dr. Danielson advised to repeat 7-2026  Last Completed Colonoscopy            COLORECTAL CANCER SCREENING (COLONOSCOPY - Every 10 Years) Tentatively due on 7/19/2033 07/19/2023  SCANNED - COLONOSCOPY    11/08/2012  COLONOSCOPY (Done)    11/08/2012  SCANNED - COLONOSCOPY                      Health Maintenance   Topic Date Due    HEPATITIS C SCREENING   Never done    COVID-19 Vaccine (4 - 2024-25 season) 09/01/2024    LIPID PANEL  02/04/2026    BMI FOLLOWUP  02/04/2026    ANNUAL WELLNESS VISIT  02/06/2026    TDAP/TD VACCINES (2 - Td or Tdap) 03/13/2029    COLORECTAL CANCER SCREENING  07/19/2033    INFLUENZA VACCINE  Completed    Pneumococcal Vaccine 50+  Completed    AAA SCREEN ONCE  Completed    ZOSTER VACCINE  Completed        CMS Preventative Services Quick Reference  Risk Factors Identified During Encounter    Depression/Dysphoria: Current medication is effective, no change recommended  Doing well on Paxil  The above risks/problems have been discussed with the patient.  Pertinent information has been shared with the patient in the After Visit Summary.  Follow up plans and orders are seen below in the Assessment/Plan Section.    Diagnoses and all orders for this visit:    1. Welcome to Medicare preventive visit (Primary)  Assessment & Plan:  Completed 2-6-2025      2. Mixed hyperlipidemia  Overview:  7-5-24- calcium score 819    Assessment & Plan:  Lipid and CMP done 2-4-2025, read by me, reviewed with pt.  Trig. 84 up from 76, Tot. Chol. 115 up from 98, HDL 35 up from 34, LDL 63 up from 48  Improved and close to goal.   Encouraged to watch fatty intake, exercise more, and lose weight.   Compliant with medication .  Patient tolerated Crestor and Fish oil  well without side effects. I feel the benefits of the medication outweigh the risks.   Is not getting adequate diet and exercise  Goals developed at last visit were not met close to goal  Follow up in  6 months  Care management needs are self-addressed. Self-management abilities addressed and patient is capable of managing his own disease.      Orders:  -     rosuvastatin (CRESTOR) 10 MG tablet; Take 1 tablet by mouth Daily.  Dispense: 90 tablet; Refill: 3  -     Lipid Panel With / Chol / HDL Ratio  -     Comprehensive Metabolic Panel    3. LVH (left ventricular hypertrophy)  Assessment & Plan:  New dx.  EKG  done 1-, EKG showed LVH, will repeat with next PE      4. Current severe episode of major depressive disorder without psychotic features, unspecified whether recurrent  Assessment & Plan:  Doing well.  Patient tolerated Paxil well without side effects. I feel the benefits of the medication outweigh the risks. Discussed with patient stopping Paxil, patient wants to think about it.      Orders:  -     PARoxetine (Paxil) 10 MG tablet; Take 1 tablet by mouth Every Morning.  Dispense: 90 tablet; Refill: 3    5. Seizures  Overview:  Last seizure was 10-1-2023  Followed with Dr. Seiple  EEG done 10-19-23,    Assessment & Plan:  Doing well.  Patient on Keppra prescribed by Dr. Seiple    Orders:  -     levETIRAcetam (KEPPRA) 750 MG tablet; Take 1 tablet by mouth 2 (Two) Times a Day.    6. Advance care planning  Assessment & Plan:  Completed 2-6-25 and scanned to chart      7. Subdural hematoma  Overview:  Probably contributes to memory loss    Assessment & Plan:  Doing well      8. Adenomatous polyp of colon, unspecified part of colon  Overview:  Colonoscopy done 7-2023 with Dr. Danielson    Assessment & Plan:  Advised repeat 7-2026      9. Back pain with radiculopathy  Assessment & Plan:  Intermittent  and mild with hard labor      10. Carpal tunnel syndrome of right wrist  Assessment & Plan:  Intermittent and mild         11. Cervical disc disease  Overview:  Hx cervical fracture    Assessment & Plan:  Doing well.      12. Deviated septum  Assessment & Plan:  Stable      13. Family history of coronary artery disease  Assessment & Plan:  Advised to lower risk factors.  Patient tolerated ASA well without side effects. I feel the benefits of the medication outweigh the risks.       14. Family history of diabetes mellitus  Assessment & Plan:  Advised to lower risk factors and have yrly glucose tested      15. Conductive hearing loss, unspecified laterality  Assessment & Plan:  Stable.  Advised to wear hearing protection  and avoid noise exposure      16. High coronary artery calcium score  Overview:  Stress test Myocardial Perfusion done 8-29-24     Assessment & Plan:  Advised to lower risk factors.  Continue ASA.  Patient tolerated ASA well without side effects. I feel the benefits of the medication outweigh the risks.       17. History of tobacco use  Overview:  Quit 1999  PPY HX 26    Assessment & Plan:  Congratulated on continued cessation      18. Immunization counseling  Overview:  UTD as of 2-6-25  COVID 11-7-21, 3-25-21 and 2-25-21  Flu 12-4-24  Pneu 20 done 2-4-25  T-Dap 3-13-19  Shingrix 2-23-22 and 3-28-19    Assessment & Plan:  UTD as of 2-6-25      19. Encounter for general adult medical examination with abnormal findings  Assessment & Plan:  Encouraged to do self-breast exam, self-testicle exams, and self derm exams. Congratulated on using seat belts.  Encouraged to do annual physical exams.  Immunization status reviewed.  Patient advised if he wants to bring someone with him to visits he can, but  he does not have to.        20. Obstructive sleep apnea syndrome  Overview:  Dr. Seiple    Assessment & Plan:  Doing well; Patient is using a C-pap and is followed by Dr. Seipel.       21. Mild early onset Alzheimer's dementia with anxiety  Assessment & Plan:  Questionable diagnosis.  Advised to discuss with Dr. Seiple staying on Aricept.  Patient was able to quickly correct a math mistake I made.  Patient tolerated Aricept well without side effects. I feel the benefits of the medication outweigh the risks.     Orders:  -     donepezil (ARICEPT) 10 MG tablet; Take 1 tablet by mouth Every Night.  Dispense: 90 tablet; Refill: 3    22. Memory loss  Assessment & Plan:  Questionable diagnosis, discuss with Dr. Seiple.  Patient was able to quickly current my math problem    Orders:  -     donepezil (ARICEPT) 10 MG tablet; Take 1 tablet by mouth Every Night.  Dispense: 90 tablet; Refill: 3    23. Thyroid nodule  Overview:  US done  7-15-24, advised repeat in 1year (7-2025), 2(7-2026), 3(7-2027) and 5 years(7-2029).  Nodule 1: Deep right thyroid lobe, 1.7 x 1.4 x 1.1 cm, solid, isoechoic, wider than tall, ill-defined margins, no echogenic foci (TR 3)   Nodule 2: Superficial right thyroid lobe, 1.3 x 1.1 x 0.9 cm, solid, hypoechoic, wider than tall, smooth margins, no echogenic foci (TR 4)   Nodule 3: Deep left thyroid lobe, 1.4 x 1.1 x 1.1 cm, solid, isoechoic, wider than tall, ill-defined margins, no echogenic foci (TR 3)     IMPRESSION:  Impression:     Thyroid nodules. Recommend follow-up ultrasound for 1.7 cm TR 3 right thyroid nodule and 1.3 cm TR 4 left thyroid nodule        TI-RADS: TR4 - Size between 1 cm and 1.5 cm. Recommend follow up thyroid ultrasound at years 1, 2, 3 and 5 of surveillance.      TI-RADS: TR3 - Size between 1.5 cm and 2.5 cm. Recommend follow up thyroid ultrasound at years 1, 3, and 5 years of  surveillance.     TI-RADS: TR3 - Size less than 1.5 cm. No follow up needed.    Assessment & Plan:  Will repeat U/S  next year      24. Diaphoresis  Assessment & Plan:  Pt. Declined T-B testing.  CBC done 12-, read by me, reviewed with pt.CBC was normal      25. Other closed nondisplaced fracture of sixth cervical vertebra, initial encounter  Comments:  Duplicate thus delete  Assessment & Plan:  Duplicate thus delete      26. Pleurisy  Comments:  Resolved thus delete  Assessment & Plan:  Resolved thus delete      27. Overweight (BMI 25.0-29.9)  Comments:  Resolved thus delete  Assessment & Plan:  Resolved thus delete      28. Ganglion cyst  Comments:  Resolved thus delete  Assessment & Plan:  Resolved thus delete      29. Nocturia  Comments:  Resolved thus delete  Assessment & Plan:  Resolved thus delete.  U/A done 1-8-2025, read by me, reviewed with pt.  U/A was normal      30. Hyperplastic colonic polyp, unspecified part of colon  Comments:  Resolved thus delete  Overview:  Colonoscopy done 7-2023 with Dr. Danielson,  advised repeat in 3 years in 7-2026    Assessment & Plan:  Resolved thus delete      31. Left upper quadrant pain  Comments:  Resolved thus delete  Assessment & Plan:  Resolved thus delete      32. Depression, unspecified depression type         Follow Up:   Initial Medicare Visit in one year    An After Visit Summary and PPPS were made available to the patient.

## 2025-02-06 NOTE — ASSESSMENT & PLAN NOTE
Questionable diagnosis.  Advised to discuss with Dr. Seiple staying on Aricept.  Patient was able to quickly correct a math mistake I made.  Patient tolerated Aricept well without side effects. I feel the benefits of the medication outweigh the risks.

## 2025-03-17 ENCOUNTER — TELEPHONE (OUTPATIENT)
Dept: NEUROLOGY | Facility: CLINIC | Age: 65
End: 2025-03-17

## 2025-03-17 ENCOUNTER — OFFICE VISIT (OUTPATIENT)
Dept: NEUROLOGY | Facility: CLINIC | Age: 65
End: 2025-03-17
Payer: MEDICARE

## 2025-03-17 VITALS
HEART RATE: 66 BPM | BODY MASS INDEX: 28.7 KG/M2 | SYSTOLIC BLOOD PRESSURE: 144 MMHG | RESPIRATION RATE: 16 BRPM | WEIGHT: 205 LBS | DIASTOLIC BLOOD PRESSURE: 82 MMHG | HEIGHT: 71 IN

## 2025-03-17 DIAGNOSIS — G47.33 OBSTRUCTIVE SLEEP APNEA SYNDROME: Primary | ICD-10-CM

## 2025-03-17 DIAGNOSIS — R41.3 MEMORY LOSS: ICD-10-CM

## 2025-03-17 DIAGNOSIS — R56.9 SEIZURES: ICD-10-CM

## 2025-03-17 PROCEDURE — 1160F RVW MEDS BY RX/DR IN RCRD: CPT | Performed by: PSYCHIATRY & NEUROLOGY

## 2025-03-17 PROCEDURE — 99213 OFFICE O/P EST LOW 20 MIN: CPT | Performed by: PSYCHIATRY & NEUROLOGY

## 2025-03-17 PROCEDURE — 1159F MED LIST DOCD IN RCRD: CPT | Performed by: PSYCHIATRY & NEUROLOGY

## 2025-03-17 NOTE — PROGRESS NOTES
"Chief Complaint  Follow-up (TAMMY AND SEIZURES)    Subjective          Mook Bose presents to [facility]  History of Present Illness  F/u on TAMMY, patient states he is benefiting from pap therapy, he uses [facility] and get supplies from [employer]     Seizures- no recent seizures,patient currently taking keppra 750 mg 1 bid  Takes Aricept for mild memory impairment     Memory unchanged patient states if he doesn't drive to a certain place that he is familiar with for about 6 months then he gets a little confused on directions on how to get there, he currently takes aricept 10mg per day..   Sleep testing history:    On NPSG at [facility] , 6/28/2021 patient had  obstructive sleep apnea syndrome with apnea-hypopnea index of 24.9 per sleep hour, minimum SpO2 of 72%    Davis Sleepiness Scale:  Sitting and reading JS Davis Sleepiness: N/A WatchingTV JS Davis Sleepiness: 0  Sitting, inactive, in a public place JS Davis Sleepiness: 0  As a passenger in a car for 1 hour w/o a break  JS Davis Sleepiness: 0  Lying down to rest in the afternoon  JS Davis Sleepiness: N/A  Sitting and talking to someone  JS Davis Sleepiness: 0  Sitting quietly after a lunch  JS Davis Sleepiness: 0  In a car, while stopped for traffic or a light  JS Davis Sleepiness: 0  Total 0    Review of Systems   Constitutional:  Negative for fatigue.   Respiratory:  Negative for apnea.    Psychiatric/Behavioral:  Negative for sleep disturbance.          Objective   Vital Signs:   /82   Pulse 66   Resp 16   Ht 179.1 cm (70.5\")   Wt 93 kg (205 lb)   BMI 29.00 kg/m²     Physical Exam  Vitals reviewed.   Neurological:      General: No focal deficit present.      Mental Status: He is oriented to person, place, and time.   Psychiatric:         Mood and Affect: Mood normal.        Result Review :                 Assessment and Plan    Diagnoses and all orders for this visit:    1. Obstructive sleep apnea syndrome " (Primary)  Overview:  Dr. Seiple      2. Seizures  Overview:  Last seizure was 10-1-2023  Followed with Dr. Seiple  EEG done 10-19-23,      3. Memory loss        The patient is compliant with and benefiting from PAP therapy.  No seizures, continue Keppra   For memory problem continue Aricept     Follow Up   Return for keep appt next november as scheduled .    Patient was given instructions and counseling regarding his condition or for health maintenance advice. Please see specific information pulled into the AVS if appropriate.       This document has been electronically signed by Joseph Seipel, MD on March 17, 2025 11:25 EDT

## 2025-03-17 NOTE — TELEPHONE ENCOUNTER
Caller: Mook Bose    Relationship: Self    Date of last appointment: 3/17/25    Issues/Supplies requested: FILTERS, MASK, AND HOSE    Type of mask (fill or nasal): FULL    Does equipment use a modem or chip: MODEM    Ordering physician's name: DR SEIPEL    Patient contact information: [phone]    Fax number where the order should be sent: [facility] ?    PHONE [phone]

## 2025-05-22 DIAGNOSIS — F32.2 CURRENT SEVERE EPISODE OF MAJOR DEPRESSIVE DISORDER WITHOUT PSYCHOTIC FEATURES, UNSPECIFIED WHETHER RECURRENT: ICD-10-CM

## 2025-05-22 RX ORDER — PAROXETINE 10 MG/1
10 TABLET, FILM COATED ORAL EVERY MORNING
Qty: 90 TABLET | Refills: 0 | Status: SHIPPED | OUTPATIENT
Start: 2025-05-22

## 2025-06-04 ENCOUNTER — OFFICE VISIT (OUTPATIENT)
Dept: FAMILY MEDICINE CLINIC | Facility: CLINIC | Age: 65
End: 2025-06-04
Payer: MEDICARE

## 2025-06-04 VITALS
TEMPERATURE: 97.1 F | WEIGHT: 209 LBS | HEIGHT: 71 IN | HEART RATE: 76 BPM | SYSTOLIC BLOOD PRESSURE: 132 MMHG | BODY MASS INDEX: 29.26 KG/M2 | OXYGEN SATURATION: 98 % | RESPIRATION RATE: 18 BRPM | DIASTOLIC BLOOD PRESSURE: 82 MMHG

## 2025-06-04 DIAGNOSIS — F33.1 MODERATE EPISODE OF RECURRENT MAJOR DEPRESSIVE DISORDER: Primary | ICD-10-CM

## 2025-06-04 NOTE — PROGRESS NOTES
Subjective   Mook Bose is a 65 y.o. male.   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  Depression is reasonably stable on Paxil but he has a tremendous amount of stress at the present.       The following portions of the patient's history were reviewed and updated as appropriate: allergies, current medications, past family history, past medical history, past social history, past surgical history, and problem list.    Past Medical History:   Diagnosis Date    Hyperlipidemia     Seizures        Past Surgical History:   Procedure Laterality Date    CRANIOTOMY  2009    VASECTOMY          Family History   Problem Relation Age of Onset    Alzheimer's disease Mother 62    Cancer Father         Unknown    Alzheimer's disease Sister 70    Heart disease Brother         dx at 59 living at 75    No Known Problems Daughter     No Known Problems Son     Heart disease Maternal Grandfather         MI at 44,  at 78    Stroke Maternal Grandfather     Hypertension Brother     Cancer Brother         dx at 49, living at 66    Other Brother         HEP. C        Social History     Socioeconomic History    Marital status:    Tobacco Use    Smoking status: Former     Current packs/day: 0.00     Average packs/day: 1 pack/day for 26.0 years (26.0 ttl pk-yrs)     Types: Cigars, Cigarettes     Start date: 1974     Quit date:      Years since quittin.4     Passive exposure: Past    Smokeless tobacco: Former     Types: Chew   Vaping Use    Vaping status: Never Used   Substance and Sexual Activity    Alcohol use: No    Drug use: No    Sexual activity: Defer       Outpatient Medications Prior to Visit   Medication Sig Dispense Refill    aspirin 81 MG tablet Take 1 tablet by mouth Daily.      donepezil (ARICEPT) 10 MG tablet Take 1 tablet by mouth Every Night. 90  "tablet 3    levETIRAcetam (KEPPRA) 750 MG tablet Take 1 tablet by mouth 2 (Two) Times a Day.      melatonin 5 MG tablet tablet Take  by mouth.      Omega-3 Fatty Acids (fish oil) 1000 MG capsule capsule Take 1 capsule by mouth Daily With Breakfast.      PARoxetine (PAXIL) 10 MG tablet TAKE 1 TABLET BY MOUTH ONCE DAILY IN THE MORNING 90 tablet 0    rosuvastatin (CRESTOR) 10 MG tablet Take 1 tablet by mouth Daily. 90 tablet 3    vitamin C (ASCORBIC ACID) 250 MG tablet Take 1 tablet by mouth Daily.      vitamin E 400 UNIT capsule Take 1 capsule by mouth Daily.       No facility-administered medications prior to visit.        Review of Systems   Respiratory:  Negative for chest tightness.    Cardiovascular:  Negative for chest pain and palpitations.   Neurological:  Negative for speech difficulty and headache.   Psychiatric/Behavioral:  Positive for stress.        Objective   Visit Vitals  /82 (BP Location: Left arm, Patient Position: Sitting, Cuff Size: Adult)   Pulse 76   Temp 97.1 °F (36.2 °C) (Temporal)   Resp 18   Ht 179.1 cm (70.5\")   Wt 94.8 kg (209 lb)   SpO2 98%   BMI 29.56 kg/m²     Physical Exam  Vitals and nursing note reviewed.   Constitutional:       Appearance: He is well-developed.   HENT:      Head: Normocephalic.   Neck:      Thyroid: No thyromegaly.      Vascular: No carotid bruit.      Trachea: Trachea normal.   Cardiovascular:      Rate and Rhythm: Normal rate and regular rhythm.      Heart sounds: No murmur heard.     No friction rub. No gallop.   Pulmonary:      Effort: Pulmonary effort is normal. No respiratory distress.      Breath sounds: Normal breath sounds. No wheezing.   Chest:      Chest wall: No tenderness.   Musculoskeletal:      Cervical back: Neck supple.   Skin:     General: Skin is dry.      Findings: No rash.      Nails: There is no clubbing.   Neurological:      Mental Status: He is alert and oriented to person, place, and time.   Psychiatric:         Behavior: Behavior is " cooperative.         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  He tolerates Paxil well without side effects he will continue this.  He is currently on 10 mg could consider increasing to 40  I spent some time counseling him on how to deal with his stress.

## 2025-06-07 PROBLEM — F33.9 EPISODE OF RECURRENT MAJOR DEPRESSIVE DISORDER: Status: ACTIVE | Noted: 2023-02-22

## 2025-06-07 NOTE — ASSESSMENT & PLAN NOTE
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  He tolerates Paxil well without side effects he will continue this.  He is currently on 10 mg could consider increasing to 40  I spent some time counseling him on how to deal with his stress.

## 2025-07-31 ENCOUNTER — OFFICE VISIT (OUTPATIENT)
Dept: FAMILY MEDICINE CLINIC | Facility: CLINIC | Age: 65
End: 2025-07-31

## 2025-07-31 VITALS
HEART RATE: 74 BPM | WEIGHT: 215 LBS | HEIGHT: 71 IN | DIASTOLIC BLOOD PRESSURE: 72 MMHG | RESPIRATION RATE: 14 BRPM | TEMPERATURE: 97.1 F | OXYGEN SATURATION: 96 % | SYSTOLIC BLOOD PRESSURE: 121 MMHG | BODY MASS INDEX: 30.1 KG/M2

## 2025-07-31 DIAGNOSIS — F33.1 MODERATE EPISODE OF RECURRENT MAJOR DEPRESSIVE DISORDER: ICD-10-CM

## 2025-07-31 DIAGNOSIS — R56.9 SEIZURES: Primary | ICD-10-CM

## 2025-07-31 DIAGNOSIS — R41.3 MEMORY LOSS: ICD-10-CM

## 2025-07-31 PROCEDURE — 99214 OFFICE O/P EST MOD 30 MIN: CPT | Performed by: FAMILY MEDICINE

## 2025-07-31 NOTE — ASSESSMENT & PLAN NOTE
-Doing well; Patient tolerated Keppra well without side effects. I feel the benefits of the medication outweigh the risks.

## 2025-07-31 NOTE — ASSESSMENT & PLAN NOTE
Stable per patient.  Patient tolerated aricept well without side effects. I feel the benefits of the medication outweigh the risks.

## 2025-07-31 NOTE — PROGRESS NOTES
Subjective   Mook Bose is a 65 y.o. male.     Seizures   This is a chronic problem. The current episode started more than 1 week ago. The problem has not changed since onset.  Depression  Visit:  Follow-up    Symptoms: depressed mood      Frequency:  Occasionally    Nighttime awakenings:     PMH Includes: depression    Memory Loss  Chronicity:  Chronic  Onset:  1 to 5 years  Frequency:  Intermittently  Progression since onset:  Worse       The following portions of the patient's history were reviewed and updated as appropriate: allergies, current medications, past family history, past medical history, past social history, past surgical history, and problem list.    Past Medical History:   Diagnosis Date    Hyperlipidemia     Seizures        Past Surgical History:   Procedure Laterality Date    CRANIOTOMY  2009    VASECTOMY          Family History   Problem Relation Age of Onset    Alzheimer's disease Mother 62    Cancer Father         Unknown    Alzheimer's disease Sister 70    Heart disease Brother         dx at 59 living at 75    No Known Problems Daughter     No Known Problems Son     Heart disease Maternal Grandfather         MI at 44,  at 78    Stroke Maternal Grandfather     Hypertension Brother     Cancer Brother         dx at 49, living at 66    Other Brother         HEP. C        Social History     Socioeconomic History    Marital status:    Tobacco Use    Smoking status: Former     Current packs/day: 0.00     Average packs/day: 1 pack/day for 26.0 years (26.0 ttl pk-yrs)     Types: Cigars, Cigarettes     Start date: 1974     Quit date:      Years since quittin.6     Passive exposure: Past    Smokeless tobacco: Former     Types: Chew   Vaping Use    Vaping status: Never Used   Substance and Sexual Activity    Alcohol use: No    Drug use: No    Sexual activity: Defer       Outpatient Medications Prior to Visit   Medication Sig Dispense Refill    aspirin 81 MG  "tablet Take 1 tablet by mouth Daily.      donepezil (ARICEPT) 10 MG tablet Take 1 tablet by mouth Every Night. 90 tablet 3    levETIRAcetam (KEPPRA) 750 MG tablet Take 1 tablet by mouth 2 (Two) Times a Day.      melatonin 5 MG tablet tablet Take  by mouth.      Omega-3 Fatty Acids (fish oil) 1000 MG capsule capsule Take 1 capsule by mouth Daily With Breakfast.      PARoxetine (PAXIL) 10 MG tablet TAKE 1 TABLET BY MOUTH ONCE DAILY IN THE MORNING 90 tablet 0    rosuvastatin (CRESTOR) 10 MG tablet Take 1 tablet by mouth Daily. 90 tablet 3    vitamin C (ASCORBIC ACID) 250 MG tablet Take 1 tablet by mouth Daily.      vitamin E 400 UNIT capsule Take 1 capsule by mouth Daily.       No facility-administered medications prior to visit.        Review of Systems   Neurological:  Positive for seizures and memory problem.   Psychiatric/Behavioral:  Positive for depressed mood.        Objective   Visit Vitals  /72 (BP Location: Right arm, Patient Position: Sitting, Cuff Size: Adult)   Pulse 74   Temp 97.1 °F (36.2 °C)   Resp 14   Ht 179.1 cm (70.5\")   Wt 97.5 kg (215 lb)   SpO2 96%   BMI 30.41 kg/m²     Physical Exam  Vitals and nursing note reviewed.   Constitutional:       Appearance: He is well-developed.   HENT:      Head: Normocephalic.   Neck:      Thyroid: No thyromegaly.      Vascular: No carotid bruit.      Trachea: Trachea normal.   Cardiovascular:      Rate and Rhythm: Normal rate and regular rhythm.      Heart sounds: No murmur heard.     No friction rub. No gallop.   Pulmonary:      Effort: Pulmonary effort is normal. No respiratory distress.      Breath sounds: Normal breath sounds. No wheezing.   Chest:      Chest wall: No tenderness.   Musculoskeletal:      Cervical back: Neck supple.   Skin:     General: Skin is dry.      Findings: No rash.      Nails: There is no clubbing.   Neurological:      Mental Status: He is alert and oriented to person, place, and time.   Psychiatric:         Behavior: Behavior is " magdalena.         Assessment & Plan   Problem List Items Addressed This Visit          High    Episode of recurrent major depressive disorder    Current Assessment & Plan   Doing well; Patient tolerated paxil well without side effects. I feel the benefits of the medication outweigh the risks.           Seizures - Primary    Overview   Last seizure was 10-1-2023  Followed with Dr. Seiple  EEG done 10-19-23,         Current Assessment & Plan   -Doing well; Patient tolerated Keppra well without side effects. I feel the benefits of the medication outweigh the risks.              Unprioritized    Memory loss    Current Assessment & Plan   Stable per patient.  Patient tolerated aricept well without side effects. I feel the benefits of the medication outweigh the risks.

## 2025-07-31 NOTE — ASSESSMENT & PLAN NOTE
Doing well; Patient tolerated paxil well without side effects. I feel the benefits of the medication outweigh the risks.

## 2025-08-02 LAB
ALBUMIN SERPL-MCNC: 4.2 G/DL (ref 3.9–4.9)
ALP SERPL-CCNC: 79 IU/L (ref 44–121)
ALT SERPL-CCNC: 17 IU/L (ref 0–44)
AST SERPL-CCNC: 13 IU/L (ref 0–40)
BILIRUB SERPL-MCNC: 0.4 MG/DL (ref 0–1.2)
BUN SERPL-MCNC: 16 MG/DL (ref 8–27)
BUN/CREAT SERPL: 19 (ref 10–24)
CALCIUM SERPL-MCNC: 9.7 MG/DL (ref 8.6–10.2)
CHLORIDE SERPL-SCNC: 100 MMOL/L (ref 96–106)
CHOLEST SERPL-MCNC: 118 MG/DL (ref 100–199)
CHOLEST/HDLC SERPL: 2.9 RATIO (ref 0–5)
CO2 SERPL-SCNC: 25 MMOL/L (ref 20–29)
CREAT SERPL-MCNC: 0.85 MG/DL (ref 0.76–1.27)
EGFRCR SERPLBLD CKD-EPI 2021: 96 ML/MIN/1.73
GLOBULIN SER CALC-MCNC: 2.9 G/DL (ref 1.5–4.5)
GLUCOSE SERPL-MCNC: 82 MG/DL (ref 70–99)
HDLC SERPL-MCNC: 41 MG/DL
LDLC SERPL CALC-MCNC: 64 MG/DL (ref 0–99)
POTASSIUM SERPL-SCNC: 4.5 MMOL/L (ref 3.5–5.2)
PROT SERPL-MCNC: 7.1 G/DL (ref 6–8.5)
SODIUM SERPL-SCNC: 140 MMOL/L (ref 134–144)
TRIGL SERPL-MCNC: 58 MG/DL (ref 0–149)
VLDLC SERPL CALC-MCNC: 13 MG/DL (ref 5–40)

## 2025-08-06 ENCOUNTER — OFFICE VISIT (OUTPATIENT)
Dept: FAMILY MEDICINE CLINIC | Facility: CLINIC | Age: 65
End: 2025-08-06

## 2025-08-06 VITALS
HEART RATE: 88 BPM | OXYGEN SATURATION: 95 % | WEIGHT: 217.4 LBS | RESPIRATION RATE: 18 BRPM | HEIGHT: 70 IN | TEMPERATURE: 97.1 F | SYSTOLIC BLOOD PRESSURE: 132 MMHG | DIASTOLIC BLOOD PRESSURE: 82 MMHG | BODY MASS INDEX: 31.12 KG/M2

## 2025-08-06 DIAGNOSIS — E78.2 MIXED HYPERLIPIDEMIA: Primary | ICD-10-CM

## 2025-08-06 PROCEDURE — 99213 OFFICE O/P EST LOW 20 MIN: CPT | Performed by: FAMILY MEDICINE

## 2025-08-06 RX ORDER — ROSUVASTATIN CALCIUM 10 MG/1
10 TABLET, COATED ORAL DAILY
Qty: 90 TABLET | Refills: 1 | Status: SHIPPED | OUTPATIENT
Start: 2025-08-06

## 2025-08-06 RX ORDER — CHLORAL HYDRATE 500 MG
1000 CAPSULE ORAL
Start: 2025-08-06

## 2025-08-18 DIAGNOSIS — F32.2 CURRENT SEVERE EPISODE OF MAJOR DEPRESSIVE DISORDER WITHOUT PSYCHOTIC FEATURES, UNSPECIFIED WHETHER RECURRENT: ICD-10-CM

## 2025-08-19 ENCOUNTER — OFFICE VISIT (OUTPATIENT)
Dept: FAMILY MEDICINE CLINIC | Facility: CLINIC | Age: 65
End: 2025-08-19

## 2025-08-19 DIAGNOSIS — I10 HYPERTENSION, UNSPECIFIED TYPE: Primary | ICD-10-CM

## 2025-08-19 PROCEDURE — 99212 OFFICE O/P EST SF 10 MIN: CPT | Performed by: FAMILY MEDICINE

## 2025-08-19 RX ORDER — PAROXETINE 10 MG/1
10 TABLET, FILM COATED ORAL EVERY MORNING
Qty: 90 TABLET | Refills: 1 | Status: SHIPPED | OUTPATIENT
Start: 2025-08-19

## 2025-08-20 VITALS — SYSTOLIC BLOOD PRESSURE: 132 MMHG | DIASTOLIC BLOOD PRESSURE: 76 MMHG

## 2025-08-20 PROBLEM — I10 HYPERTENSION: Status: ACTIVE | Noted: 2025-08-20
